# Patient Record
Sex: FEMALE | Race: WHITE | NOT HISPANIC OR LATINO | Employment: UNEMPLOYED | URBAN - METROPOLITAN AREA
[De-identification: names, ages, dates, MRNs, and addresses within clinical notes are randomized per-mention and may not be internally consistent; named-entity substitution may affect disease eponyms.]

---

## 2022-11-10 ENCOUNTER — OFFICE VISIT (OUTPATIENT)
Dept: INTERNAL MEDICINE | Facility: CLINIC | Age: 42
End: 2022-11-10

## 2022-11-10 VITALS
HEART RATE: 78 BPM | SYSTOLIC BLOOD PRESSURE: 132 MMHG | OXYGEN SATURATION: 98 % | DIASTOLIC BLOOD PRESSURE: 84 MMHG | TEMPERATURE: 97.8 F | RESPIRATION RATE: 15 BRPM | BODY MASS INDEX: 30.51 KG/M2 | HEIGHT: 69 IN | WEIGHT: 206 LBS

## 2022-11-10 DIAGNOSIS — E78.2 MIXED HYPERLIPIDEMIA: ICD-10-CM

## 2022-11-10 DIAGNOSIS — E11.8 TYPE 2 DIABETES MELLITUS WITH COMPLICATION: Primary | ICD-10-CM

## 2022-11-10 DIAGNOSIS — Z12.31 ENCOUNTER FOR SCREENING MAMMOGRAM FOR BREAST CANCER: ICD-10-CM

## 2022-11-10 DIAGNOSIS — E03.9 HYPOTHYROIDISM, UNSPECIFIED TYPE: ICD-10-CM

## 2022-11-10 DIAGNOSIS — F41.1 GENERALIZED ANXIETY DISORDER: ICD-10-CM

## 2022-11-10 DIAGNOSIS — D50.9 IRON DEFICIENCY ANEMIA, UNSPECIFIED IRON DEFICIENCY ANEMIA TYPE: ICD-10-CM

## 2022-11-10 LAB
BASOPHILS # BLD AUTO: 0.08 10*3/MM3 (ref 0–0.2)
BASOPHILS NFR BLD AUTO: 1.1 % (ref 0–1.5)
BILIRUB UR QL STRIP: NEGATIVE
CHOLEST SERPL-MCNC: 247 MG/DL (ref 0–200)
CLARITY UR: ABNORMAL
COLOR UR: YELLOW
DEPRECATED RDW RBC AUTO: 41.3 FL (ref 37–54)
EOSINOPHIL # BLD AUTO: 0.16 10*3/MM3 (ref 0–0.4)
EOSINOPHIL NFR BLD AUTO: 2.3 % (ref 0.3–6.2)
ERYTHROCYTE [DISTWIDTH] IN BLOOD BY AUTOMATED COUNT: 12.4 % (ref 12.3–15.4)
FERRITIN SERPL-MCNC: 92.5 NG/ML (ref 13–150)
GLUCOSE BLDC GLUCOMTR-MCNC: 123 MG/DL (ref 70–130)
GLUCOSE UR STRIP-MCNC: ABNORMAL MG/DL
HCT VFR BLD AUTO: 43.1 % (ref 34–46.6)
HDLC SERPL-MCNC: 57 MG/DL (ref 40–60)
HGB BLD-MCNC: 14.5 G/DL (ref 12–15.9)
HGB UR QL STRIP.AUTO: NEGATIVE
IMM GRANULOCYTES # BLD AUTO: 0.02 10*3/MM3 (ref 0–0.05)
IMM GRANULOCYTES NFR BLD AUTO: 0.3 % (ref 0–0.5)
IRON 24H UR-MRATE: 100 MCG/DL (ref 37–145)
IRON SATN MFR SERPL: 20 % (ref 20–50)
KETONES UR QL STRIP: NEGATIVE
LDLC SERPL CALC-MCNC: 147 MG/DL (ref 0–100)
LDLC/HDLC SERPL: 2.49 {RATIO}
LEUKOCYTE ESTERASE UR QL STRIP.AUTO: NEGATIVE
LYMPHOCYTES # BLD AUTO: 2.19 10*3/MM3 (ref 0.7–3.1)
LYMPHOCYTES NFR BLD AUTO: 31.2 % (ref 19.6–45.3)
MCH RBC QN AUTO: 30.5 PG (ref 26.6–33)
MCHC RBC AUTO-ENTMCNC: 33.6 G/DL (ref 31.5–35.7)
MCV RBC AUTO: 90.5 FL (ref 79–97)
MONOCYTES # BLD AUTO: 0.48 10*3/MM3 (ref 0.1–0.9)
MONOCYTES NFR BLD AUTO: 6.8 % (ref 5–12)
NEUTROPHILS NFR BLD AUTO: 4.09 10*3/MM3 (ref 1.7–7)
NEUTROPHILS NFR BLD AUTO: 58.3 % (ref 42.7–76)
NITRITE UR QL STRIP: NEGATIVE
NRBC BLD AUTO-RTO: 0 /100 WBC (ref 0–0.2)
PH UR STRIP.AUTO: 5.5 [PH] (ref 5–8)
PLATELET # BLD AUTO: 277 10*3/MM3 (ref 140–450)
PMV BLD AUTO: 11.4 FL (ref 6–12)
PROT UR QL STRIP: NEGATIVE
RBC # BLD AUTO: 4.76 10*6/MM3 (ref 3.77–5.28)
SP GR UR STRIP: >=1.03 (ref 1–1.03)
T3FREE SERPL-MCNC: 3.32 PG/ML (ref 2–4.4)
T4 FREE SERPL-MCNC: 1.12 NG/DL (ref 0.93–1.7)
TIBC SERPL-MCNC: 508 MCG/DL (ref 298–536)
TRANSFERRIN SERPL-MCNC: 341 MG/DL (ref 200–360)
TRIGL SERPL-MCNC: 239 MG/DL (ref 0–150)
TSH SERPL DL<=0.05 MIU/L-ACNC: 2.53 UIU/ML (ref 0.27–4.2)
UROBILINOGEN UR QL STRIP: ABNORMAL
VLDLC SERPL-MCNC: 43 MG/DL (ref 5–40)
WBC NRBC COR # BLD: 7.02 10*3/MM3 (ref 3.4–10.8)

## 2022-11-10 PROCEDURE — 99204 OFFICE O/P NEW MOD 45 MIN: CPT | Performed by: PHYSICIAN ASSISTANT

## 2022-11-10 PROCEDURE — 80061 LIPID PANEL: CPT | Performed by: PHYSICIAN ASSISTANT

## 2022-11-10 PROCEDURE — 84481 FREE ASSAY (FT-3): CPT | Performed by: PHYSICIAN ASSISTANT

## 2022-11-10 PROCEDURE — 82962 GLUCOSE BLOOD TEST: CPT | Performed by: PHYSICIAN ASSISTANT

## 2022-11-10 PROCEDURE — 81003 URINALYSIS AUTO W/O SCOPE: CPT | Performed by: PHYSICIAN ASSISTANT

## 2022-11-10 PROCEDURE — 83036 HEMOGLOBIN GLYCOSYLATED A1C: CPT | Performed by: PHYSICIAN ASSISTANT

## 2022-11-10 PROCEDURE — 83540 ASSAY OF IRON: CPT | Performed by: PHYSICIAN ASSISTANT

## 2022-11-10 PROCEDURE — 84439 ASSAY OF FREE THYROXINE: CPT | Performed by: PHYSICIAN ASSISTANT

## 2022-11-10 PROCEDURE — 82728 ASSAY OF FERRITIN: CPT | Performed by: PHYSICIAN ASSISTANT

## 2022-11-10 PROCEDURE — 80050 GENERAL HEALTH PANEL: CPT | Performed by: PHYSICIAN ASSISTANT

## 2022-11-10 PROCEDURE — 84466 ASSAY OF TRANSFERRIN: CPT | Performed by: PHYSICIAN ASSISTANT

## 2022-11-10 RX ORDER — ATORVASTATIN CALCIUM 10 MG/1
10 TABLET, FILM COATED ORAL DAILY
Qty: 30 TABLET | Refills: 1 | Status: SHIPPED | OUTPATIENT
Start: 2022-11-10 | End: 2023-03-14 | Stop reason: SDUPTHER

## 2022-11-10 RX ORDER — LISINOPRIL 2.5 MG/1
2.5 TABLET ORAL DAILY
Qty: 30 TABLET | Refills: 1 | Status: SHIPPED | OUTPATIENT
Start: 2022-11-10 | End: 2023-03-14 | Stop reason: SDUPTHER

## 2022-11-10 RX ORDER — FENOFIBRATE 120 MG/1
TABLET ORAL
COMMUNITY
End: 2022-11-10

## 2022-11-10 RX ORDER — BUSPIRONE HYDROCHLORIDE 10 MG/1
TABLET ORAL
COMMUNITY
Start: 2022-08-26 | End: 2022-11-21

## 2022-11-10 RX ORDER — LANCETS 28 GAUGE
EACH MISCELLANEOUS
Qty: 100 EACH | Refills: 12 | Status: SHIPPED | OUTPATIENT
Start: 2022-11-10

## 2022-11-10 RX ORDER — GEMFIBROZIL 600 MG/1
600 TABLET, FILM COATED ORAL DAILY
COMMUNITY
Start: 2022-10-20 | End: 2022-11-10

## 2022-11-10 RX ORDER — BLOOD-GLUCOSE METER
1 KIT MISCELLANEOUS 4 TIMES DAILY
COMMUNITY
Start: 2022-09-15 | End: 2022-11-10

## 2022-11-10 RX ORDER — METFORMIN HYDROCHLORIDE EXTENDED-RELEASE TABLETS 1000 MG/1
2000 TABLET, FILM COATED, EXTENDED RELEASE ORAL
Qty: 60 TABLET | Refills: 1 | Status: SHIPPED | OUTPATIENT
Start: 2022-11-10 | End: 2023-03-14 | Stop reason: SDUPTHER

## 2022-11-10 NOTE — PROGRESS NOTES
"Chief Complaint  Diabetes,  EST CARE    Subjective          Sharmaine Torres presents to [facility]  History of Present Illness  Last PCP: sri Farley  Previous Specialists: GYN at [facility]  Last labs: 3 months ago  Last mammogram: never, family history of breast cancer in paternal aunt  Last pap: 2021  Last colonoscopy: ,  no family history of colon cancer     DM: pt has been out of medicine x 10 days  Previously on metformin  Was rx ozempic but never picked it up due to PA  Last a1c 11.5  She has never been on insulin     HTN: denies ever taking anything for bp  Denies chest pain, palpitations, ha, dizziness    Hypothyroid: pt told her thyroid was abnormal previously and she was taking synthroid  States her last doctor took her off medicine.      Past Medical History:   Diagnosis Date   • Anxiety    • Depression    • Diabetes mellitus (HCC)    • Endometriosis    • Hyperlipidemia    • Hypothyroidism    • Obesity         Past Surgical History:   Procedure Laterality Date   • APPENDECTOMY     •  SECTION     • CHOLECYSTECTOMY     • HAND SURGERY     • HYSTERECTOMY     • TUBAL ABDOMINAL LIGATION          Current Outpatient Medications on File Prior to Visit   Medication Sig Dispense Refill   • busPIRone (BUSPAR) 10 MG tablet        No current facility-administered medications on file prior to visit.        Allergies   Allergen Reactions   • Codeine Nausea And Vomiting     Pt stated tylenol codeine       Social History     Tobacco Use   Smoking Status Never   Smokeless Tobacco Never          Objective   Vital Signs:   /84   Pulse 78   Temp 97.8 °F (36.6 °C)   Resp 15   Ht 175.3 cm (69\")   Wt 93.4 kg (206 lb)   SpO2 98%   BMI 30.42 kg/m²     Physical Exam  Vitals reviewed.   Constitutional:       Appearance: Normal appearance.   HENT:      Head: Normocephalic and atraumatic.      Nose: Nose normal.      Mouth/Throat:    "   Mouth: Mucous membranes are moist.   Eyes:      Extraocular Movements: Extraocular movements intact.      Conjunctiva/sclera: Conjunctivae normal.      Pupils: Pupils are equal, round, and reactive to light.   Cardiovascular:      Rate and Rhythm: Normal rate and regular rhythm.   Pulmonary:      Effort: Pulmonary effort is normal.      Breath sounds: Normal breath sounds.   Abdominal:      General: Abdomen is flat. Bowel sounds are normal.      Palpations: Abdomen is soft.   Musculoskeletal:         General: Normal range of motion.   Neurological:      General: No focal deficit present.      Mental Status: She is alert and oriented to person, place, and time.   Psychiatric:         Mood and Affect: Mood normal.        Result Review :                 Assessment and Plan    Diagnoses and all orders for this visit:    1. Type 2 diabetes mellitus with complication (HCC) (Primary)  Assessment & Plan:  Risks of blood sugar elevation include death, heart attack, stroke, kidney disease, blindness. Discussed importance of medication compliance and not missing doses. Will restart metformin. We will monitor at follow up after labs drawn and adjust medications if still elevated. To er if chest pain, palpitations, vision loss, unilateral weakness, altered mental status.Discussed glucometer sent to pharmacy, check blood sugar first thing in the morning to get fasting sugar reading. Bring blood glucose log to follow up. Will start ACEI for kidney protection and bp. Will start statin for cardiac protection and cholesterol.   Pt understands and agrees with plan.    Orders:  -     POCT Glucose  -     Urinalysis With Culture If Indicated -; Future  -     Comprehensive Metabolic Panel  -     CBC & Differential  -     Hemoglobin A1c  -     Ambulatory Referral to Chronic Care Management  -     Urinalysis With Culture If Indicated - Urine, Clean Catch    2. Mixed hyperlipidemia  -     Lipid Panel  -     Ambulatory Referral to Chronic  Care Management    3. Hypothyroidism, unspecified type  Assessment & Plan:  Labs today, will restart medicine if indicated based on results.    Orders:  -     TSH  -     T3, Free  -     T4, Free  -     Ambulatory Referral to Chronic Care Management    4. Iron deficiency anemia, unspecified iron deficiency anemia type  -     Iron and TIBC  -     Ferritin    5. Encounter for screening mammogram for breast cancer  -     Mammo Screening Bilateral With CAD; Future    6. Generalized anxiety disorder  Comments:  controlled on buspar    Other orders  -     glucose blood test strip; Check blood sugar once daily in the morning  Dispense: 100 each; Refill: 12  -     Lancets (freestyle) lancets; Check blood sugar once daily in the morning  Dispense: 100 each; Refill: 12  -     Blood Glucose Monitoring Suppl w/Device kit; Dispense 1 kit with monitor to check blood sugar once daily. Diagnosis: DM E11.65  Dispense: 1 each; Refill: 0  -     metFORMIN (FORTAMET) 1000 MG (OSM) 24 hr tablet; Take 2 tablets by mouth Daily With Breakfast.  Dispense: 60 tablet; Refill: 1  -     lisinopril (Zestril) 2.5 MG tablet; Take 1 tablet by mouth Daily.  Dispense: 30 tablet; Refill: 1  -     atorvastatin (LIPITOR) 10 MG tablet; Take 1 tablet by mouth Daily.  Dispense: 30 tablet; Refill: 1      Follow Up   Return in about 2 weeks (around 11/24/2022).  Patient was given instructions and counseling regarding her condition or for health maintenance advice. Please see specific information pulled into the AVS if appropriate.

## 2022-11-11 ENCOUNTER — REFERRAL TRIAGE (OUTPATIENT)
Dept: CASE MANAGEMENT | Facility: OTHER | Age: 42
End: 2022-11-11

## 2022-11-11 ENCOUNTER — TRANSCRIBE ORDERS (OUTPATIENT)
Dept: ADMINISTRATIVE | Facility: HOSPITAL | Age: 42
End: 2022-11-11

## 2022-11-11 DIAGNOSIS — R03.0 ELEVATED BLOOD PRESSURE READING: ICD-10-CM

## 2022-11-11 DIAGNOSIS — E03.9 HYPOTHYROIDISM, UNSPECIFIED TYPE: Primary | ICD-10-CM

## 2022-11-11 DIAGNOSIS — Z12.31 VISIT FOR SCREENING MAMMOGRAM: Primary | ICD-10-CM

## 2022-11-11 DIAGNOSIS — E78.2 MIXED HYPERLIPIDEMIA: ICD-10-CM

## 2022-11-11 DIAGNOSIS — E11.8 TYPE 2 DIABETES MELLITUS WITH COMPLICATION: ICD-10-CM

## 2022-11-11 LAB
ALBUMIN SERPL-MCNC: 4.7 G/DL (ref 3.5–5.2)
ALBUMIN/GLOB SERPL: 1.6 G/DL
ALP SERPL-CCNC: 83 U/L (ref 39–117)
ALT SERPL W P-5'-P-CCNC: 16 U/L (ref 1–33)
ANION GAP SERPL CALCULATED.3IONS-SCNC: 9.4 MMOL/L (ref 5–15)
AST SERPL-CCNC: 24 U/L (ref 1–32)
BILIRUB SERPL-MCNC: 0.2 MG/DL (ref 0–1.2)
BUN SERPL-MCNC: 9 MG/DL (ref 6–20)
BUN/CREAT SERPL: 20.5 (ref 7–25)
CALCIUM SPEC-SCNC: 10.3 MG/DL (ref 8.6–10.5)
CHLORIDE SERPL-SCNC: 97 MMOL/L (ref 98–107)
CO2 SERPL-SCNC: 29.6 MMOL/L (ref 22–29)
CREAT SERPL-MCNC: 0.44 MG/DL (ref 0.57–1)
EGFRCR SERPLBLD CKD-EPI 2021: 125.6 ML/MIN/1.73
GLOBULIN UR ELPH-MCNC: 2.9 GM/DL
GLUCOSE SERPL-MCNC: 117 MG/DL (ref 65–99)
HBA1C MFR BLD: 8.7 % (ref 4.8–5.6)
POTASSIUM SERPL-SCNC: 4.5 MMOL/L (ref 3.5–5.2)
PROT SERPL-MCNC: 7.6 G/DL (ref 6–8.5)
SODIUM SERPL-SCNC: 136 MMOL/L (ref 136–145)

## 2022-11-14 PROBLEM — F41.1 GENERALIZED ANXIETY DISORDER: Status: ACTIVE | Noted: 2022-11-14

## 2022-11-14 NOTE — PATIENT INSTRUCTIONS
Preventing Diabetes Mellitus Complications  You can help to prevent or slow down problems that are caused by diabetes (diabetes mellitus). Following your diabetes plan and taking care of yourself can reduce your risk of serious or life-threatening complications.  What actions can I take to prevent diabetes complications?  Diabetes management    Follow instructions from your health care providers about managing your diabetes. Your diabetes may be managed by a team of health care providers who can teach you how to care for yourself and can answer questions that you have.  Educate yourself about your condition so you can make healthy choices about eating and physical activity.  Know your target range for your blood sugar (glucose), and check your blood glucose level as often as told. Your health care provider will help you decide how often to check your blood glucose level depending on your treatment goals and how well you are meeting them.  Ask your health care provider if you should take low-dose aspirin daily and what dose is recommended for you. Taking low-dose aspirin daily is recommended to help prevent cardiovascular disease.  Controlling your blood pressure and cholesterol  Your personal target blood pressure is determined based on:  Your age.  Your medicines.  How long you have had diabetes.  Any other medical conditions you have.  To control your blood pressure:  Follow instructions from your health care provider about meal planning, exercise, and medicines.  Make sure your health care provider checks your blood pressure at every medical visit.  Monitor your blood pressure at home as told by your health care provider.  To control your cholesterol:  Follow instructions from your health care provider about meal planning, exercise, and medicines.  Have your cholesterol checked at least once a year.  You may be prescribed medicine to lower cholesterol (statin). If you are not taking a statin, ask your health care  provider if you should be.  Controlling your cholesterol may:  Help prevent heart disease and stroke. These are the most common health problems for people with diabetes.  Improve your blood flow.    Medical appointments and vaccines  Schedule and keep yearly physical exams and eye exams. Your health care provider will tell you how often you need medical visits depending on your diabetes management plan. Keep all follow-up visits as told. This is important so possible problems can be identified early and complications can be avoided or treated.  Every visit with your health care provider should include measuring your:  Weight.  Blood pressure.  Blood glucose control.  Your A1C (hemoglobin A1C) level should be checked:  At least 2 times a year, if you are meeting your treatment goals.  4 times a year, if you are not meeting treatment goals or if your treatment goals have changed.  Your blood lipids (lipid profile) should be checked yearly. You should also be checked yearly for protein in your urine (urine microalbumin).  If you have type 1 diabetes, get an eye exam 3-5 years after you are diagnosed, and then once a year after your first exam.  If you have type 2 diabetes, get an eye exam as soon as you are diagnosed, and then once a year after your first exam.  It is also important to keep your vaccines current. It is recommended that you receive:  A flu (influenza) vaccine every year.  A pneumonia (pneumococcal) vaccine and a hepatitis B vaccine. If you are age 65 or older, you may get the pneumonia vaccine as a series of two separate shots.  Ask your health care provider which other vaccines may be recommended.  Lifestyle  Do not use any products that contain nicotine or tobacco, such as cigarettes, e-cigarettes, and chewing tobacco. If you need help quitting, ask your health care provider. By avoiding nicotine and tobacco:  You will lower your risk for heart attack, stroke, nerve disease, and kidney disease.  Your  cholesterol and blood pressure may improve.  Your blood circulation will improve.  If you drink alcohol:  Limit how much you use to:  0-1 drink a day for women who are not pregnant.  0-2 drinks a day for men.  Be aware of how much alcohol is in your drink. In the [place] one drink equals one 12 oz bottle of beer (355 mL), one 5 oz glass of wine (148 mL), or one 11?2 oz glass of hard liquor (44 mL).  Taking care of your feet  Diabetes may cause you to have poor blood circulation to your legs and feet. Because of this, taking care of your feet is very important. Diabetes can cause:  The skin on the feet to get thinner, break more easily, and heal more slowly.  Nerve damage in your legs and feet, which results in decreased feeling. You may not notice minor injuries that could lead to serious problems.  To avoid foot problems:  Check your skin and feet every day for cuts, bruises, redness, blisters, or sores.  Schedule a foot exam with your health care provider once every year. This exam includes:  Inspecting the structure and skin of your feet.  Checking the pulses and sensation in your feet.  Make sure that your health care provider performs a visual foot exam at every medical visit.    Taking care of your teeth  People with poorly controlled diabetes are more likely to have gum (periodontal) disease. Diabetes can make periodontal diseases harder to control. If not treated, periodontal diseases can lead to tooth loss. To prevent this:  Brush your teeth twice a day.  Floss at least once a day.  Visit your dentist 2 times a year.  Managing stress  Living with diabetes can be stressful. When you are experiencing stress, your blood glucose may be affected in two ways:  Stress hormones may cause your blood glucose to rise.  You may be distracted from taking good care of yourself.  Be aware of your stress level and make changes to help you manage challenging situations. To lower your stress levels:  Consider joining a support  group.  Do planned relaxation or meditation.  Do a hobby that you enjoy.  Maintain healthy relationships.  Exercise regularly.  Work with your health care provider or a mental health professional.  Where to find more information  American Diabetes Association: www.diabetes.org  Association of Diabetes Care and Education Specialists: www.diabeteseducator.org  Summary  You can take action to prevent or slow down problems that are caused by diabetes (diabetes mellitus). Following your diabetes plan and taking care of yourself can reduce your risk of serious or life-threatening complications.  Follow instructions from your health care providers about managing your diabetes. Your diabetes may be managed by a team of health care providers who can teach you how to care for yourself and can answer questions that you have.  Know your target range for your blood sugar (glucose), and check your blood glucose levels as often as told. Your health care provider will help you decide how often you should check your blood glucose level depending on your treatment goals and how well you are meeting them.  Your health care provider will tell you how often you need medical visits depending on your diabetes management plan. Keep all follow-up visits as directed. This is important so possible problems can be identified early and complications can be avoided or treated.  This information is not intended to replace advice given to you by your health care provider. Make sure you discuss any questions you have with your health care provider.  Document Revised: 02/05/2021 Document Reviewed: 02/05/2021  Elsevier Patient Education © 2022 [employer]

## 2022-11-14 NOTE — PROGRESS NOTES
I have reviewed the notes, assessments, and/or procedures performed by Adri Reeder PA-C, I concur with her documentation of Sharmaine Torres.

## 2022-11-14 NOTE — ASSESSMENT & PLAN NOTE
Risks of blood sugar elevation include death, heart attack, stroke, kidney disease, blindness. Discussed importance of medication compliance and not missing doses. Will restart metformin. We will monitor at follow up after labs drawn and adjust medications if still elevated. To er if chest pain, palpitations, vision loss, unilateral weakness, altered mental status.Discussed glucometer sent to pharmacy, check blood sugar first thing in the morning to get fasting sugar reading. Bring blood glucose log to follow up. Will start ACEI for kidney protection and bp. Will start statin for cardiac protection and cholesterol.   Pt understands and agrees with plan.

## 2022-11-17 ENCOUNTER — TELEPHONE (OUTPATIENT)
Dept: CASE MANAGEMENT | Facility: OTHER | Age: 42
End: 2022-11-17

## 2022-11-17 NOTE — TELEPHONE ENCOUNTER
Called patient for chronic care management - regarding DM, HTN and new medications. Need to follow up on sugar log and bp.  Will attempt again next week.

## 2022-11-21 ENCOUNTER — TELEPHONE (OUTPATIENT)
Dept: CASE MANAGEMENT | Facility: OTHER | Age: 42
End: 2022-11-21

## 2022-11-21 RX ORDER — BUSPIRONE HYDROCHLORIDE 10 MG/1
10 TABLET ORAL 2 TIMES DAILY PRN
Qty: 60 TABLET | Refills: 1 | Status: SHIPPED | OUTPATIENT
Start: 2022-11-21 | End: 2023-02-15

## 2022-12-01 ENCOUNTER — TELEPHONE (OUTPATIENT)
Dept: CASE MANAGEMENT | Facility: OTHER | Age: 42
End: 2022-12-01

## 2022-12-01 NOTE — TELEPHONE ENCOUNTER
Left detailed message for patient to return my call. There has been several attempts to contact for CCM enrollment,b/p and glucose logs. This referral will be closed for unable to reach.

## 2023-02-13 ENCOUNTER — HOSPITAL ENCOUNTER (OUTPATIENT)
Dept: MAMMOGRAPHY | Facility: HOSPITAL | Age: 43
Discharge: HOME | End: 2023-02-13
Admitting: PHYSICIAN ASSISTANT
Payer: MEDICAID

## 2023-02-13 DIAGNOSIS — Z12.31 VISIT FOR SCREENING MAMMOGRAM: ICD-10-CM

## 2023-02-13 PROCEDURE — 77067 SCR MAMMO BI INCL CAD: CPT

## 2023-02-13 PROCEDURE — 77063 BREAST TOMOSYNTHESIS BI: CPT

## 2023-02-15 RX ORDER — BUSPIRONE HYDROCHLORIDE 10 MG/1
TABLET ORAL
Qty: 180 TABLET | Refills: 1 | Status: SHIPPED | OUTPATIENT
Start: 2023-02-15 | End: 2023-03-14 | Stop reason: SDUPTHER

## 2023-03-02 ENCOUNTER — TELEPHONE (OUTPATIENT)
Dept: INTERNAL MEDICINE | Facility: CLINIC | Age: 43
End: 2023-03-02
Payer: MEDICAID

## 2023-03-02 NOTE — TELEPHONE ENCOUNTER
"Patient states she is having chest pain blood sugar staying in 300\"to 384. She states she is taking metformin. She states that she feels weird. I advised her to go to the Emergency Room . Patient states she will go.   "

## 2023-03-14 ENCOUNTER — OFFICE VISIT (OUTPATIENT)
Dept: INTERNAL MEDICINE | Facility: CLINIC | Age: 43
End: 2023-03-14
Payer: MEDICAID

## 2023-03-14 ENCOUNTER — TELEPHONE (OUTPATIENT)
Dept: INTERNAL MEDICINE | Facility: CLINIC | Age: 43
End: 2023-03-14
Payer: MEDICAID

## 2023-03-14 VITALS
BODY MASS INDEX: 31.84 KG/M2 | HEART RATE: 78 BPM | HEIGHT: 69 IN | TEMPERATURE: 97.9 F | DIASTOLIC BLOOD PRESSURE: 82 MMHG | SYSTOLIC BLOOD PRESSURE: 134 MMHG | OXYGEN SATURATION: 97 % | WEIGHT: 214.95 LBS

## 2023-03-14 DIAGNOSIS — F41.1 GENERALIZED ANXIETY DISORDER: ICD-10-CM

## 2023-03-14 DIAGNOSIS — Z11.59 NEED FOR HEPATITIS C SCREENING TEST: ICD-10-CM

## 2023-03-14 DIAGNOSIS — F33.1 MODERATE EPISODE OF RECURRENT MAJOR DEPRESSIVE DISORDER: ICD-10-CM

## 2023-03-14 DIAGNOSIS — E78.2 MIXED HYPERLIPIDEMIA: ICD-10-CM

## 2023-03-14 DIAGNOSIS — E11.9 ENCOUNTER FOR DIABETIC FOOT EXAM: ICD-10-CM

## 2023-03-14 DIAGNOSIS — Z00.00 ANNUAL PHYSICAL EXAM: Primary | ICD-10-CM

## 2023-03-14 DIAGNOSIS — F90.2 ATTENTION DEFICIT HYPERACTIVITY DISORDER (ADHD), COMBINED TYPE: ICD-10-CM

## 2023-03-14 DIAGNOSIS — E11.65 TYPE 2 DIABETES MELLITUS WITH HYPERGLYCEMIA, WITHOUT LONG-TERM CURRENT USE OF INSULIN: ICD-10-CM

## 2023-03-14 LAB
ALBUMIN SERPL-MCNC: 4.7 G/DL (ref 3.5–5.2)
ALBUMIN UR-MCNC: 43.7 MG/DL
ALBUMIN/GLOB SERPL: 1.5 G/DL
ALP SERPL-CCNC: 115 U/L (ref 39–117)
ALT SERPL W P-5'-P-CCNC: 30 U/L (ref 1–33)
ANION GAP SERPL CALCULATED.3IONS-SCNC: 13.2 MMOL/L (ref 5–15)
AST SERPL-CCNC: 29 U/L (ref 1–32)
BASOPHILS # BLD AUTO: 0.07 10*3/MM3 (ref 0–0.2)
BASOPHILS NFR BLD AUTO: 1.2 % (ref 0–1.5)
BILIRUB SERPL-MCNC: 0.3 MG/DL (ref 0–1.2)
BUN SERPL-MCNC: 12 MG/DL (ref 6–20)
BUN/CREAT SERPL: 18.2 (ref 7–25)
CALCIUM SPEC-SCNC: 10 MG/DL (ref 8.6–10.5)
CHLORIDE SERPL-SCNC: 98 MMOL/L (ref 98–107)
CHOLEST SERPL-MCNC: 257 MG/DL (ref 0–200)
CO2 SERPL-SCNC: 22.8 MMOL/L (ref 22–29)
CREAT SERPL-MCNC: 0.66 MG/DL (ref 0.57–1)
DEPRECATED RDW RBC AUTO: 39.9 FL (ref 37–54)
EGFRCR SERPLBLD CKD-EPI 2021: 113.9 ML/MIN/1.73
EOSINOPHIL # BLD AUTO: 0.13 10*3/MM3 (ref 0–0.4)
EOSINOPHIL NFR BLD AUTO: 2.3 % (ref 0.3–6.2)
ERYTHROCYTE [DISTWIDTH] IN BLOOD BY AUTOMATED COUNT: 12.3 % (ref 12.3–15.4)
GLOBULIN UR ELPH-MCNC: 3.1 GM/DL
GLUCOSE SERPL-MCNC: 367 MG/DL (ref 65–99)
HBA1C MFR BLD: 9.4 % (ref 4.8–5.6)
HCT VFR BLD AUTO: 43.5 % (ref 34–46.6)
HCV AB SER DONR QL: NORMAL
HDLC SERPL-MCNC: 45 MG/DL (ref 40–60)
HGB BLD-MCNC: 14.8 G/DL (ref 12–15.9)
IMM GRANULOCYTES # BLD AUTO: 0.01 10*3/MM3 (ref 0–0.05)
IMM GRANULOCYTES NFR BLD AUTO: 0.2 % (ref 0–0.5)
LDLC SERPL CALC-MCNC: 148 MG/DL (ref 0–100)
LDLC/HDLC SERPL: 3.17 {RATIO}
LYMPHOCYTES # BLD AUTO: 2.66 10*3/MM3 (ref 0.7–3.1)
LYMPHOCYTES NFR BLD AUTO: 46.1 % (ref 19.6–45.3)
MCH RBC QN AUTO: 31 PG (ref 26.6–33)
MCHC RBC AUTO-ENTMCNC: 34 G/DL (ref 31.5–35.7)
MCV RBC AUTO: 91 FL (ref 79–97)
MONOCYTES # BLD AUTO: 0.43 10*3/MM3 (ref 0.1–0.9)
MONOCYTES NFR BLD AUTO: 7.5 % (ref 5–12)
NEUTROPHILS NFR BLD AUTO: 2.47 10*3/MM3 (ref 1.7–7)
NEUTROPHILS NFR BLD AUTO: 42.7 % (ref 42.7–76)
NRBC BLD AUTO-RTO: 0 /100 WBC (ref 0–0.2)
PLATELET # BLD AUTO: 256 10*3/MM3 (ref 140–450)
PMV BLD AUTO: 11.3 FL (ref 6–12)
POTASSIUM SERPL-SCNC: 4.8 MMOL/L (ref 3.5–5.2)
PROT SERPL-MCNC: 7.8 G/DL (ref 6–8.5)
RBC # BLD AUTO: 4.78 10*6/MM3 (ref 3.77–5.28)
SODIUM SERPL-SCNC: 134 MMOL/L (ref 136–145)
TRIGL SERPL-MCNC: 347 MG/DL (ref 0–150)
VLDLC SERPL-MCNC: 64 MG/DL (ref 5–40)
WBC NRBC COR # BLD: 5.77 10*3/MM3 (ref 3.4–10.8)

## 2023-03-14 PROCEDURE — 83036 HEMOGLOBIN GLYCOSYLATED A1C: CPT

## 2023-03-14 PROCEDURE — 80061 LIPID PANEL: CPT

## 2023-03-14 PROCEDURE — 99396 PREV VISIT EST AGE 40-64: CPT

## 2023-03-14 PROCEDURE — 80053 COMPREHEN METABOLIC PANEL: CPT

## 2023-03-14 PROCEDURE — 86803 HEPATITIS C AB TEST: CPT

## 2023-03-14 PROCEDURE — 82043 UR ALBUMIN QUANTITATIVE: CPT

## 2023-03-14 PROCEDURE — 85025 COMPLETE CBC W/AUTO DIFF WBC: CPT

## 2023-03-14 PROCEDURE — 1160F RVW MEDS BY RX/DR IN RCRD: CPT

## 2023-03-14 RX ORDER — METFORMIN HYDROCHLORIDE EXTENDED-RELEASE TABLETS 1000 MG/1
2000 TABLET, FILM COATED, EXTENDED RELEASE ORAL
Qty: 60 TABLET | Refills: 1 | Status: SHIPPED | OUTPATIENT
Start: 2023-03-14

## 2023-03-14 RX ORDER — CHLORHEXIDINE GLUCONATE 0.12 MG/ML
RINSE ORAL
COMMUNITY
Start: 2023-03-07

## 2023-03-14 RX ORDER — IBUPROFEN 800 MG/1
800 TABLET ORAL EVERY 6 HOURS PRN
COMMUNITY
Start: 2023-03-07

## 2023-03-14 RX ORDER — ACETAMINOPHEN AND CODEINE PHOSPHATE 300; 30 MG/1; MG/1
TABLET ORAL SEE ADMIN INSTRUCTIONS
COMMUNITY
Start: 2023-03-07

## 2023-03-14 RX ORDER — LISINOPRIL 2.5 MG/1
2.5 TABLET ORAL DAILY
Qty: 90 TABLET | Refills: 0 | Status: SHIPPED | OUTPATIENT
Start: 2023-03-14

## 2023-03-14 RX ORDER — ATORVASTATIN CALCIUM 10 MG/1
10 TABLET, FILM COATED ORAL DAILY
Qty: 90 TABLET | Refills: 0 | Status: SHIPPED | OUTPATIENT
Start: 2023-03-14

## 2023-03-14 RX ORDER — AMOXICILLIN 500 MG/1
1 CAPSULE ORAL 3 TIMES DAILY
COMMUNITY
Start: 2023-03-07

## 2023-03-14 RX ORDER — BUSPIRONE HYDROCHLORIDE 10 MG/1
10 TABLET ORAL NIGHTLY
Qty: 180 TABLET | Refills: 1 | Status: SHIPPED | OUTPATIENT
Start: 2023-03-14

## 2023-03-14 NOTE — PROGRESS NOTES
Chief Complaint  Diabetes (High blood sugar )    Subjective      Sharmaine Torres presents to [facility]  History of Present Illness    Sharmaine is here for follow-up on diabetes and annual exam.    Previous Specialists: GYN at [facility]  Last labs: 4 months ago  Last mammogram: 2/13/2023  Last pap: June 2021  Last colonoscopy: 2021,  no family history of colon cancer      DM:   During her last office visit she was restarted on metformin, lisinopril for kidney protection, and a statin for cardiovascular health.  Patient reports she is not taking lisinopril or atorvastatin.  Last A1c, November 2022, was 8.7.  She reports being on Ozempic, Trulicity, insulin in the past.  Reviewed telephone encounter from 3/2/2023 that mention the patient called into the office with chest pain and high blood sugars.  She reported at that time her blood sugar was staying in the 300s.  She says that her sugars have been staying consistently in the 300s since then.  She stated she felt very off during that episode as well.  The phone nurse advised her to go to the emergency department that day and patient verbalized that she would go.  No further documentation of ER visit after that day. She felt better shortly after that and did not go to the ER for evaluation. Blood sugars have been running in the 300s     Tooth -   She reports recent multiple teeth extraction (5) and having multiple rounds of antibiotics.  She says she still having some swelling in her face and significant discomfort.     She denies any previous medical history of hypertension.  She reports being prescribed lisinopril 2.5 mg for kidney protection but did not take the medication yet.  She denies chest pain, shortness of breath, palpitations, headache, dizziness.    Hypothyroid: She reports that once her thyroid was abnormal and she was on thyroid medication at that time.  Her last doctor took her off the medication and  has not had to have replacement medicine in a while.  She denies any further concerns regarding her thyroid.      Anxiety -   Buspar takes once a night.  She reports an increase in anxiety and panic attacks and possibly some depression.  She reports her mother-in-law has moved in with her and that has caused some significant issues with her anxiety/stress.  She also reports that her children have moved out all but 1 and that has been causing some emotional distress.  She reports having a positive medical history of ADHD and is wondering about evaluation for adult ADHD and medication.  She also wonders if she would do better with a different medication other than the BuSpar.  She says she been taking one 5 mg pill of BuSpar nightly at bedtime.  She says it does help her sleep and helps with her anxiety some.  She denies SI/HI but she does have some increasing depressive thoughts.  She thinks counseling would be beneficial for her.         Current Outpatient Medications   Medication Instructions   • acetaminophen-codeine (TYLENOL #3) 300-30 MG per tablet Oral, See Admin Instructions, Take 1 tablet by mouth every 4-6 hours as needed for pain   • amoxicillin (AMOXIL) 500 MG capsule 1 capsule, Oral, 3 Times Daily   • atorvastatin (LIPITOR) 10 mg, Oral, Daily   • Blood Glucose Monitoring Suppl w/Device kit Dispense 1 kit with monitor to check blood sugar once daily. Diagnosis: DM E11.65   • busPIRone (BUSPAR) 10 mg, Oral, Nightly   • chlorhexidine (PERIDEX) 0.12 % solution RINSE WITH 15ML FOR 30 SECONDS AND SPIT, USE TWICE DAILY AFTER BRUSHING AND FLOSSING . STARTING ON DAY 1, USE FOR 14 DAYS   • glucose blood test strip Check blood sugar once daily in the morning   • ibuprofen (ADVIL,MOTRIN) 800 mg, Oral, Every 6 Hours PRN, for pain   • Lancets (freestyle) lancets Check blood sugar once daily in the morning   • lisinopril (ZESTRIL) 2.5 mg, Oral, Daily   • metFORMIN (FORTAMET) 2,000 mg, Oral, Daily With Breakfast  "      The following portions of the patient's history were reviewed and updated as appropriate: allergies, current medications, past family history, past medical history, past social history, past surgical history, and problem list.    Objective   Vital Signs:   /82   Pulse 78   Temp 97.9 °F (36.6 °C)   Ht 175.3 cm (69\")   Wt 97.5 kg (214 lb 15.2 oz)   SpO2 97%   BMI 31.74 kg/m²     Wt Readings from Last 3 Encounters:   03/14/23 97.5 kg (214 lb 15.2 oz)   11/10/22 93.4 kg (206 lb)     BP Readings from Last 3 Encounters:   03/14/23 134/82   11/10/22 132/84     Physical Exam  Vitals reviewed.   Constitutional:       Appearance: Normal appearance. She is well-developed.   HENT:      Head: Normocephalic and atraumatic.      Mouth/Throat:      Pharynx: No oropharyngeal exudate.   Eyes:      Conjunctiva/sclera: Conjunctivae normal.      Pupils: Pupils are equal, round, and reactive to light.   Cardiovascular:      Rate and Rhythm: Normal rate and regular rhythm.      Heart sounds: No murmur heard.    No friction rub. No gallop.   Pulmonary:      Effort: Pulmonary effort is normal.      Breath sounds: Normal breath sounds. No wheezing or rhonchi.   Skin:     General: Skin is warm and dry.   Neurological:      Mental Status: She is alert and oriented to person, place, and time.   Psychiatric:         Mood and Affect: Affect normal.          Result Review :  The following data was reviewed by: JANE Euceda on 03/14/2023:  Lipid Panel    Lipid Panel 11/10/22   Total Cholesterol 247 (A)   Triglycerides 239 (A)   HDL Cholesterol 57   VLDL Cholesterol 43 (A)   LDL Cholesterol  147 (A)   LDL/HDL Ratio 2.49   (A) Abnormal value            TSH    TSH 11/10/22   TSH 2.530           A1C Last 3 Results    HGBA1C Last 3 Results 11/10/22   Hemoglobin A1C 8.70 (A)   (A) Abnormal value              Common labs    Common Labs 11/10/22 11/10/22 11/10/22 11/10/22    1631 1631 1631 1631   Glucose   117 (A)    BUN   9  "   Creatinine   0.44 (A)    Sodium   136    Potassium   4.5    Chloride   97 (A)    Calcium   10.3    Albumin   4.70    Total Bilirubin   0.2    Alkaline Phosphatase   83    AST (SGOT)   24    ALT (SGPT)   16    WBC 7.02      Hemoglobin 14.5      Hematocrit 43.1      Platelets 277      Total Cholesterol  247 (A)     Triglycerides  239 (A)     HDL Cholesterol  57     LDL Cholesterol   147 (A)     Hemoglobin A1C    8.70 (A)   (A) Abnormal value              Lab Results (last 72 hours)     Procedure Component Value Units Date/Time    CBC & Differential [[id number]] Collected: 03/14/23 1240    Specimen: Blood from Arm, Left Updated: 03/14/23 1241    Narrative:      The following orders were created for panel order CBC & Differential.  Procedure                               Abnormality         Status                     ---------                               -----------         ------                     CBC Auto Differential[[id number]]                            In process                   Please view results for these tests on the individual orders.    Comprehensive Metabolic Panel [[id number]] Collected: 03/14/23 1240    Specimen: Blood from Arm, Left Updated: 03/14/23 1241    Hemoglobin A1c [[id number]] Collected: 03/14/23 1240    Specimen: Blood from Arm, Left Updated: 03/14/23 1241    Lipid Panel [[id number]] Collected: 03/14/23 1240    Specimen: Blood from Arm, Left Updated: 03/14/23 1241    MicroAlbumin, Urine, Random - Urine, Clean Catch [[id number]] Collected: 03/14/23 1240    Specimen: Urine, Clean Catch Updated: 03/14/23 1241    Hepatitis C Antibody [[id number]] Collected: 03/14/23 1240    Specimen: Blood from Arm, Left Updated: 03/14/23 1241    CBC Auto Differential [[id number]] Collected: 03/14/23 1240    Specimen: Blood from Arm, Left Updated: 03/14/23 1241           Mammo Screening Digital Tomosynthesis Bilateral With CAD    Result Date: 2/13/2023   Benign mammogram. Suggest routine mammographic screening.   RECOMMENDATION(S):  ROUTINE MAMMOGRAM AND CLINICAL EVALUATION IN 12 MONTHS.   BIRADS:  DIAGNOSTIC CATEGORY 2--BENIGN FINDING   BREAST COMPOSITION: Scattered areas fibroglandular density.  PLEASE NOTE:  A NORMAL MAMMOGRAM DOES NOT EXCLUDE THE POSSIBILITY OF BREAST CANCER. ANY CLINICALLY SUSPICIOUS PALPABLE LUMP SHOULD BE BIOPSIED.      TIM CRUZ MD       Electronically Signed and Approved By: TIM CRUZ MD on 2/13/2023 at 14:02               Lab Results   Component Value Date    INR 0.87 (L) 10/08/2020    BILIRUBINUR Negative 11/10/2022    POCGLU 123 11/10/2022       Procedures        Assessment and Plan   Diagnoses and all orders for this visit:    1. Annual physical exam (Primary)  Assessment & Plan:  Screening labs reviewed/ordered  Counseling provided regarding age appropriate screenings and immunizations, healthy diet and exercise.       2. Type 2 diabetes mellitus with hyperglycemia, without long-term current use of insulin (HCC)  Comments:  Most likely expected for A1c to increase.  Patient has been taking metformin intermittently.  Discussed medication compliance.  Initially, last visit with PCP Jardiance was sent into pharmacy however patient states she never received the prescription.  We will plan to restart/initiate Jardiance for further diabetes control.  Discussed possible need of injectable medication depending on A1c.  Patient wishes to exhaust all pill forms of medication prior to initiating injections if possible.  Discussed appropriate diet changes and most likely elevated sugars related to infection/inflammation from recent teeth extractions.  Discussed worrisome symptoms and when to follow-up.  Recommended 3-month follow-up with repeat labs.  Orders:  -     CBC & Differential  -     Comprehensive Metabolic Panel  -     Hemoglobin A1c  -     Lipid Panel  -     MicroAlbumin, Urine, Random - Urine, Clean Catch  -     Ambulatory Referral for Diabetic Eye Exam-Ophthalmology  -      Ambulatory Referral to Podiatry  -     atorvastatin (LIPITOR) 10 MG tablet; Take 1 tablet by mouth Daily.  Dispense: 90 tablet; Refill: 0  -     lisinopril (Zestril) 2.5 MG tablet; Take 1 tablet by mouth Daily.  Dispense: 90 tablet; Refill: 0  -     metFORMIN (FORTAMET) 1000 MG (OSM) 24 hr tablet; Take 2 tablets by mouth Daily With Breakfast.  Dispense: 60 tablet; Refill: 1  -     busPIRone (BUSPAR) 10 MG tablet; Take 1 tablet by mouth Every Night.  Dispense: 180 tablet; Refill: 1    3. Need for hepatitis C screening test  -     Hepatitis C Antibody    4. Encounter for diabetic foot exam (HCC)  -     Ambulatory Referral to Podiatry    5. Generalized anxiety disorder  Comments:  Recommended increasing BuSpar to 10 mg nightly.  Psych referral also placed for further evaluation/treatment plan options.  Orders:  -     Ambulatory Referral to Psychiatry    6. Moderate episode of recurrent major depressive disorder (HCC)  Comments:  Patient reports increasing depression most likely triggered by anxiety/panic attacks.  Psych referral placed.  Orders:  -     Ambulatory Referral to Psychiatry    7. Attention deficit hyperactivity disorder (ADHD), combined type  Comments:  History of ADHD and patient was treated with medications in the past.  Patient requesting evaluation.  Psych referral placed.  Orders:  -     Ambulatory Referral to Psychiatry    8. Mixed hyperlipidemia  Comments:  Strongly encourage patient to start taking low-dose Lipitor.  Cholesterol panel drawn today for reevaluation.  Discussed benefits of medication        Medications Discontinued During This Encounter   Medication Reason   • metFORMIN (FORTAMET) 1000 MG (OSM) 24 hr tablet Reorder   • lisinopril (Zestril) 2.5 MG tablet Reorder   • atorvastatin (LIPITOR) 10 MG tablet Reorder   • busPIRone (BUSPAR) 10 MG tablet Reorder        I spent 37 minutes caring for Sharmaine on this date of service. This time includes time spent by me in the following  activities:preparing for the visit, reviewing tests, obtaining and/or reviewing a separately obtained history, performing a medically appropriate examination and/or evaluation , counseling and educating the patient/family/caregiver, ordering medications, tests, or procedures, referring and communicating with other health care professionals , documenting information in the medical record, independently interpreting results and communicating that information with the patient/family/caregiver and care coordination  Follow Up   Return in about 3 months (around 6/14/2023).  Patient was given instructions and counseling regarding her condition or for health maintenance advice. Please see specific information pulled into the AVS if appropriate.       Saloni Aguirre, APRN  03/14/23  14:51 EDT

## 2023-03-14 NOTE — TELEPHONE ENCOUNTER
Pharmacy called wanted to change   metformin 1000 mg to  metformin 500 ER  take 4 tablets daily. Saloni approved this and I called it in.

## 2023-04-12 ENCOUNTER — TELEPHONE (OUTPATIENT)
Dept: INTERNAL MEDICINE | Facility: CLINIC | Age: 43
End: 2023-04-12

## 2023-04-12 DIAGNOSIS — N89.8 VAGINAL IRRITATION: Primary | ICD-10-CM

## 2023-04-12 NOTE — TELEPHONE ENCOUNTER
Caller: Sharmaine Torres    Relationship: Self    Best call back number: [phone]    What medication are you requesting: SOMETHING TO TREAT YEAST INFECTION    What are your current symptoms: GENITAL SWELLING, SORENESS, LIGHT DISCHARGE, ITCHING AND DRYNESS    How long have you been experiencing symptoms: SINCE 04.08.2023    Have you had these symptoms before:    [x] Yes  [] No    Have you been treated for these symptoms before:   [x] Yes  [] No    If a prescription is needed, what is your preferred pharmacy and phone number: [address] [phone]  - [phone] FX     Additional notes:  PATIENT BELIEVES THAT SHE HAS A YEAST INFECTION AND THAT IT IS A SIDE EFFECT OF THE JARDIANCE MEDICATION THAT SHE WAS RECENTLY PRESCRIBED.     PATIENT STATES THAT A DETAILED VOICE MESSAGE CAN BE LEFT.

## 2023-04-13 RX ORDER — FLUCONAZOLE 150 MG/1
150 TABLET ORAL ONCE
Qty: 2 TABLET | Refills: 0 | Status: SHIPPED | OUTPATIENT
Start: 2023-04-13 | End: 2023-04-13

## 2023-04-13 NOTE — TELEPHONE ENCOUNTER
I have sent in two Diflucan pills for her. Take one today and then take the other 3 days from now if no improvement. If symptoms return, you need to make a follow up appointment to discuss possibly changing the jardiance. Make sure you're monitoring your blood sugar in the meantime.

## 2023-07-27 ENCOUNTER — PATIENT OUTREACH (OUTPATIENT)
Dept: CASE MANAGEMENT | Facility: OTHER | Age: 43
End: 2023-07-27
Payer: MEDICAID

## 2023-07-27 DIAGNOSIS — E11.8 TYPE 2 DIABETES MELLITUS WITH COMPLICATION: Primary | ICD-10-CM

## 2023-07-27 NOTE — OUTREACH NOTE
AMBULATORY CASE MANAGEMENT NOTE    Name and Relationship of Patient/Support Person: Sharmaine Torres - Self    Patient Outreach    Spoke with patient regarding CCM program - patient interested, however has not seen her PCP since 11/10/2022.     Assisted with scheduling patient follow up appointment for 8/7/23.     ACM to follow up with patient in person during PCP appointment to go over CCM program and consent. Patient agreeable to plan.           Juana RODRIGUEZ  Ambulatory Case Management    7/27/2023, 11:57 EDT

## 2023-08-07 ENCOUNTER — OFFICE VISIT (OUTPATIENT)
Dept: INTERNAL MEDICINE | Facility: CLINIC | Age: 43
End: 2023-08-07
Payer: MEDICAID

## 2023-08-07 VITALS
HEIGHT: 69 IN | SYSTOLIC BLOOD PRESSURE: 112 MMHG | HEART RATE: 87 BPM | OXYGEN SATURATION: 96 % | RESPIRATION RATE: 15 BRPM | TEMPERATURE: 97.6 F | WEIGHT: 207 LBS | DIASTOLIC BLOOD PRESSURE: 78 MMHG | BODY MASS INDEX: 30.66 KG/M2

## 2023-08-07 DIAGNOSIS — F41.1 GENERALIZED ANXIETY DISORDER: ICD-10-CM

## 2023-08-07 DIAGNOSIS — E78.2 MIXED HYPERLIPIDEMIA: ICD-10-CM

## 2023-08-07 DIAGNOSIS — F90.9 ATTENTION DEFICIT HYPERACTIVITY DISORDER (ADHD), UNSPECIFIED ADHD TYPE: ICD-10-CM

## 2023-08-07 DIAGNOSIS — E03.9 HYPOTHYROIDISM, UNSPECIFIED TYPE: ICD-10-CM

## 2023-08-07 DIAGNOSIS — E11.65 TYPE 2 DIABETES MELLITUS WITH HYPERGLYCEMIA, WITHOUT LONG-TERM CURRENT USE OF INSULIN: Primary | ICD-10-CM

## 2023-08-07 DIAGNOSIS — E11.65 TYPE 2 DIABETES MELLITUS WITH HYPERGLYCEMIA, WITHOUT LONG-TERM CURRENT USE OF INSULIN: ICD-10-CM

## 2023-08-07 DIAGNOSIS — E11.8 TYPE 2 DIABETES MELLITUS WITH COMPLICATION: ICD-10-CM

## 2023-08-07 PROCEDURE — 3046F HEMOGLOBIN A1C LEVEL >9.0%: CPT | Performed by: PHYSICIAN ASSISTANT

## 2023-08-07 PROCEDURE — 1160F RVW MEDS BY RX/DR IN RCRD: CPT | Performed by: PHYSICIAN ASSISTANT

## 2023-08-07 PROCEDURE — 1159F MED LIST DOCD IN RCRD: CPT | Performed by: PHYSICIAN ASSISTANT

## 2023-08-07 PROCEDURE — 99214 OFFICE O/P EST MOD 30 MIN: CPT | Performed by: PHYSICIAN ASSISTANT

## 2023-08-07 RX ORDER — ATORVASTATIN CALCIUM 10 MG/1
10 TABLET, FILM COATED ORAL DAILY
Qty: 90 TABLET | Refills: 1 | Status: SHIPPED | OUTPATIENT
Start: 2023-08-07

## 2023-08-07 RX ORDER — EMPAGLIFLOZIN, METFORMIN HYDROCHLORIDE 12.5; 1 MG/1; MG/1
2 TABLET, EXTENDED RELEASE ORAL DAILY
Qty: 60 TABLET | Refills: 3 | Status: SHIPPED | OUTPATIENT
Start: 2023-08-07

## 2023-08-07 RX ORDER — BUPROPION HYDROCHLORIDE 100 MG/1
100 TABLET, EXTENDED RELEASE ORAL 2 TIMES DAILY
Qty: 60 TABLET | Refills: 2 | Status: SHIPPED | OUTPATIENT
Start: 2023-08-07

## 2023-08-07 RX ORDER — LISINOPRIL 2.5 MG/1
2.5 TABLET ORAL DAILY
Qty: 90 TABLET | Refills: 1 | Status: SHIPPED | OUTPATIENT
Start: 2023-08-07

## 2023-08-07 RX ORDER — BUSPIRONE HYDROCHLORIDE 10 MG/1
10 TABLET ORAL NIGHTLY
Qty: 180 TABLET | Refills: 1 | Status: SHIPPED | OUTPATIENT
Start: 2023-08-07

## 2023-08-07 RX ORDER — SEMAGLUTIDE 1.34 MG/ML
0.25 INJECTION, SOLUTION SUBCUTANEOUS WEEKLY
Qty: 1.5 ML | Refills: 1 | Status: SHIPPED | OUTPATIENT
Start: 2023-08-07

## 2023-08-07 RX ORDER — LANCETS 28 GAUGE
EACH MISCELLANEOUS
Qty: 100 EACH | Refills: 12 | Status: SHIPPED | OUTPATIENT
Start: 2023-08-07

## 2023-08-07 NOTE — PROGRESS NOTES
"Chief Complaint  Diabetes, Anxiety, ADD, and foot pain    Subjective          Sharmaine Torres presents to [facility]  History of Present Illness  DM: pt stopped all medicines  BG has been 300s.  Metformin hurt her stomach  Stopped Lisinopril   Denies chest pain, palpitations, ha, dizziness, sob  Pt states she was previously on ozempic which helped her sugars a lot     HLD: Stopped atorvastatin      ADHD: she did not follow up with psych yet, she has not scheduled an appt  She is interested in trying a non stimulant    Anxiety: well controlled with buspar  Denies si/hi      Past Medical History:   Diagnosis Date    Anxiety     Depression     Diabetes mellitus     Endometriosis     Hyperlipidemia     Hypothyroidism     Obesity         Past Surgical History:   Procedure Laterality Date    APPENDECTOMY       SECTION      CHOLECYSTECTOMY      HAND SURGERY      HYSTERECTOMY      TUBAL ABDOMINAL LIGATION          Current Outpatient Medications on File Prior to Visit   Medication Sig Dispense Refill    Blood Glucose Monitoring Suppl w/Device kit Dispense 1 kit with monitor to check blood sugar once daily. Diagnosis: DM E11.65 1 each 0     No current facility-administered medications on file prior to visit.        Allergies   Allergen Reactions    Codeine Nausea And Vomiting     Pt stated tylenol codeine       Social History     Tobacco Use   Smoking Status Never   Smokeless Tobacco Never          Objective   Vital Signs:   /78 (BP Location: Left arm, Patient Position: Sitting, Cuff Size: Adult)   Pulse 87   Temp 97.6 øF (36.4 øC) (Temporal)   Resp 15   Ht 175.3 cm (69\")   Wt 93.9 kg (207 lb)   SpO2 96%   BMI 30.57 kg/mý     Physical Exam  Vitals reviewed.   Constitutional:       Appearance: Normal appearance.   HENT:      Head: Normocephalic and atraumatic.      Nose: Nose normal.      Mouth/Throat:      Mouth: Mucous membranes " are moist.   Eyes:      Extraocular Movements: Extraocular movements intact.      Conjunctiva/sclera: Conjunctivae normal.      Pupils: Pupils are equal, round, and reactive to light.   Cardiovascular:      Rate and Rhythm: Normal rate and regular rhythm.   Pulmonary:      Effort: Pulmonary effort is normal.      Breath sounds: Normal breath sounds.   Abdominal:      General: Abdomen is flat. Bowel sounds are normal.      Palpations: Abdomen is soft.   Musculoskeletal:         General: Normal range of motion.   Neurological:      General: No focal deficit present.      Mental Status: She is alert and oriented to person, place, and time.   Psychiatric:         Mood and Affect: Mood normal.      Result Review :            BMI is >= 30 and <35. (Class 1 Obesity). The following options were offered after discussion;: weight loss educational material (shared in after visit summary)              Assessment and Plan    Diagnoses and all orders for this visit:    1. Type 2 diabetes mellitus with hyperglycemia, without long-term current use of insulin (Primary)  -     Comprehensive Metabolic Panel  -     CBC & Differential  -     TSH  -     Hemoglobin A1c  -     atorvastatin (LIPITOR) 10 MG tablet; Take 1 tablet by mouth Daily.  Dispense: 90 tablet; Refill: 1  -     lisinopril (Zestril) 2.5 MG tablet; Take 1 tablet by mouth Daily.  Dispense: 90 tablet; Refill: 1  -     busPIRone (BUSPAR) 10 MG tablet; Take 1 tablet by mouth Every Night.  Dispense: 180 tablet; Refill: 1  -     Ambulatory Referral to Diabetic Education    2. Mixed hyperlipidemia  Comments:  Discussed cardiac protection of statin, will restart  Orders:  -     Lipid Panel    3. Hypothyroidism, unspecified type  -     TSH  -     T4, free    4. Type 2 diabetes mellitus with hyperglycemia, without long-term current use of insulin  Comments:  Most likely expected for A1c to increase.  Patient has been taking metformin intermittently.  Discussed medication  compliance  Orders:  -     Comprehensive Metabolic Panel  -     CBC & Differential  -     TSH  -     Hemoglobin A1c  -     atorvastatin (LIPITOR) 10 MG tablet; Take 1 tablet by mouth Daily.  Dispense: 90 tablet; Refill: 1  -     lisinopril (Zestril) 2.5 MG tablet; Take 1 tablet by mouth Daily.  Dispense: 90 tablet; Refill: 1  -     busPIRone (BUSPAR) 10 MG tablet; Take 1 tablet by mouth Every Night.  Dispense: 180 tablet; Refill: 1  -     Ambulatory Referral to Diabetic Education    5. Type 2 diabetes mellitus with complication  Assessment & Plan:  Risks of blood sugar elevation include death, heart attack, stroke, kidney disease, blindness. Discussed importance of medication compliance and not missing doses. Will start Synjardy to see if better tolerated. Will restart Ozempic. We will monitor at follow up and adjust medications if still elevated. To er if chest pain, palpitations, vision loss, unilateral weakness, altered mental status. Pt understands and agrees with plan.        6. Generalized anxiety disorder  Assessment & Plan:  Cont buspar. Will monitor for worsening with use of Wellbutrin for ADHD      7. Attention deficit hyperactivity disorder (ADHD), unspecified ADHD type  Comments:  Discussed f/u with psych, given contact info from referral. Will start wellbutrin today. Discussed side effects. Will monitor anxiety. Rtc 1 month.    Other orders  -     glucose blood test strip; Check blood sugar once daily in the morning  Dispense: 100 each; Refill: 12  -     Lancets (freestyle) lancets; Check blood sugar once daily in the morning  Dispense: 100 each; Refill: 12  -     Semaglutide,0.25 or 0.5MG/DOS, (Ozempic, 0.25 or 0.5 MG/DOSE,) 2 MG/1.5ML solution pen-injector; Inject 0.25 mg under the skin into the appropriate area as directed 1 (One) Time Per Week.  Dispense: 1.5 mL; Refill: 1  -     Empagliflozin-metFORMIN HCl ER (Synjardy XR) 12.5-1000 MG tablet sustained-release 24 hour; Take 2 tablets by mouth  Daily.  Dispense: 60 tablet; Refill: 3  -     buPROPion SR (Wellbutrin SR) 100 MG 12 hr tablet; Take 1 tablet by mouth 2 (Two) Times a Day.  Dispense: 60 tablet; Refill: 2        Follow Up   Return in about 1 month (around 9/7/2023).  Patient was given instructions and counseling regarding her condition or for health maintenance advice. Please see specific information pulled into the AVS if appropriate.

## 2023-08-08 NOTE — ASSESSMENT & PLAN NOTE
Risks of blood sugar elevation include death, heart attack, stroke, kidney disease, blindness. Discussed importance of medication compliance and not missing doses. Will start Synjardy to see if better tolerated. Will restart Ozempic. We will monitor at follow up and adjust medications if still elevated. To er if chest pain, palpitations, vision loss, unilateral weakness, altered mental status. Pt understands and agrees with plan.

## 2023-08-08 NOTE — PATIENT INSTRUCTIONS
Preventing Diabetes Mellitus Complications  You can help to prevent or slow down problems that are caused by diabetes (diabetes mellitus). Following your diabetes plan and taking care of yourself can reduce your risk of serious or life-threatening complications.  What actions can I take to prevent diabetes complications?  Diabetes management    Follow instructions from your health care providers about managing your diabetes. Your diabetes may be managed by a team of health care providers who can teach you how to care for yourself and can answer questions that you have.  Educate yourself about your condition so you can make healthy choices about eating and physical activity.  Know your target range for your blood sugar (glucose), and check your blood glucose level as often as told. Your health care provider will help you decide how often to check your blood glucose level depending on your treatment goals and how well you are meeting them.  Ask your health care provider if you should take low-dose aspirin daily and what dose is recommended for you. Taking low-dose aspirin daily is recommended to help prevent cardiovascular disease.  Controlling your blood pressure and cholesterol  Your personal target blood pressure is determined based on:  Your age.  Your medicines.  How long you have had diabetes.  Any other medical conditions you have.  To control your blood pressure:  Follow instructions from your health care provider about meal planning, exercise, and medicines.  Make sure your health care provider checks your blood pressure at every medical visit.  Monitor your blood pressure at home as told by your health care provider.  To control your cholesterol:  Follow instructions from your health care provider about meal planning, exercise, and medicines.  Have your cholesterol checked at least once a year.  You may be prescribed medicine to lower cholesterol (statin). If you are not taking a statin, ask your health care  provider if you should be.  Controlling your cholesterol may:  Help prevent heart disease and stroke. These are the most common health problems for people with diabetes.  Improve your blood flow.    Medical appointments and vaccines  Schedule and keep yearly physical exams and eye exams. Your health care provider will tell you how often you need medical visits depending on your diabetes management plan. Keep all follow-up visits as told. This is important so possible problems can be identified early and complications can be avoided or treated.  Every visit with your health care provider should include measuring your:  Weight.  Blood pressure.  Blood glucose control.  Your A1C (hemoglobin A1C) level should be checked:  At least 2 times a year, if you are meeting your treatment goals.  4 times a year, if you are not meeting treatment goals or if your treatment goals have changed.  Your blood lipids (lipid profile) should be checked yearly. You should also be checked yearly for protein in your urine (urine microalbumin).  If you have type 1 diabetes, get an eye exam 3-5 years after you are diagnosed, and then once a year after your first exam.  If you have type 2 diabetes, get an eye exam as soon as you are diagnosed, and then once a year after your first exam.  It is also important to keep your vaccines current. It is recommended that you receive:  A flu (influenza) vaccine every year.  A pneumonia (pneumococcal) vaccine and a hepatitis B vaccine. If you are age 65 or older, you may get the pneumonia vaccine as a series of two separate shots.  Ask your health care provider which other vaccines may be recommended.  Lifestyle  Do not use any products that contain nicotine or tobacco, such as cigarettes, e-cigarettes, and chewing tobacco. If you need help quitting, ask your health care provider. By avoiding nicotine and tobacco:  You will lower your risk for heart attack, stroke, nerve disease, and kidney disease.  Your  cholesterol and blood pressure may improve.  Your blood circulation will improve.  If you drink alcohol:  Limit how much you use to:  0-1 drink a day for women who are not pregnant.  0-2 drinks a day for men.  Be aware of how much alcohol is in your drink. In the [place] one drink equals one 12 oz bottle of beer (355 mL), one 5 oz glass of wine (148 mL), or one 11?2 oz glass of hard liquor (44 mL).  Taking care of your feet  Diabetes may cause you to have poor blood circulation to your legs and feet. Because of this, taking care of your feet is very important. Diabetes can cause:  The skin on the feet to get thinner, break more easily, and heal more slowly.  Nerve damage in your legs and feet, which results in decreased feeling. You may not notice minor injuries that could lead to serious problems.  To avoid foot problems:  Check your skin and feet every day for cuts, bruises, redness, blisters, or sores.  Schedule a foot exam with your health care provider once every year. This exam includes:  Inspecting the structure and skin of your feet.  Checking the pulses and sensation in your feet.  Make sure that your health care provider performs a visual foot exam at every medical visit.    Taking care of your teeth  People with poorly controlled diabetes are more likely to have gum (periodontal) disease. Diabetes can make periodontal diseases harder to control. If not treated, periodontal diseases can lead to tooth loss. To prevent this:  Brush your teeth twice a day.  Floss at least once a day.  Visit your dentist 2 times a year.  Managing stress  Living with diabetes can be stressful. When you are experiencing stress, your blood glucose may be affected in two ways:  Stress hormones may cause your blood glucose to rise.  You may be distracted from taking good care of yourself.  Be aware of your stress level and make changes to help you manage challenging situations. To lower your stress levels:  Consider joining a support  group.  Do planned relaxation or meditation.  Do a hobby that you enjoy.  Maintain healthy relationships.  Exercise regularly.  Work with your health care provider or a mental health professional.  Where to find more information  American Diabetes Association: www.diabetes.org  Association of Diabetes Care and Education Specialists: www.diabeteseducator.org  Summary  You can take action to prevent or slow down problems that are caused by diabetes (diabetes mellitus). Following your diabetes plan and taking care of yourself can reduce your risk of serious or life-threatening complications.  Follow instructions from your health care providers about managing your diabetes. Your diabetes may be managed by a team of health care providers who can teach you how to care for yourself and can answer questions that you have.  Know your target range for your blood sugar (glucose), and check your blood glucose levels as often as told. Your health care provider will help you decide how often you should check your blood glucose level depending on your treatment goals and how well you are meeting them.  Your health care provider will tell you how often you need medical visits depending on your diabetes management plan. Keep all follow-up visits as directed. This is important so possible problems can be identified early and complications can be avoided or treated.  This information is not intended to replace advice given to you by your health care provider. Make sure you discuss any questions you have with your health care provider.  Document Revised: 02/05/2021 Document Reviewed: 02/05/2021  Elsevier Patient Education c 2023 [employer]

## 2023-08-11 ENCOUNTER — PRIOR AUTHORIZATION (OUTPATIENT)
Dept: INTERNAL MEDICINE | Facility: CLINIC | Age: 43
End: 2023-08-11
Payer: MEDICAID

## 2023-08-11 NOTE — TELEPHONE ENCOUNTER
The request has been approved. The authorization is effective from 08/11/2023 to 08/10/2024, as long as the member is enrolled in their current health plan. The request was approved with a quantity restriction. This has been approved for a max daily dosage of 2. A written notification letter will follow with additional details.

## 2023-09-18 ENCOUNTER — OFFICE VISIT (OUTPATIENT)
Dept: INTERNAL MEDICINE | Facility: CLINIC | Age: 43
End: 2023-09-18
Payer: MEDICAID

## 2023-09-18 VITALS
HEART RATE: 84 BPM | OXYGEN SATURATION: 97 % | RESPIRATION RATE: 15 BRPM | WEIGHT: 205 LBS | TEMPERATURE: 97.5 F | DIASTOLIC BLOOD PRESSURE: 88 MMHG | HEIGHT: 69 IN | SYSTOLIC BLOOD PRESSURE: 130 MMHG | BODY MASS INDEX: 30.36 KG/M2

## 2023-09-18 DIAGNOSIS — F41.1 GENERALIZED ANXIETY DISORDER: ICD-10-CM

## 2023-09-18 DIAGNOSIS — F90.9 ATTENTION DEFICIT HYPERACTIVITY DISORDER (ADHD), UNSPECIFIED ADHD TYPE: ICD-10-CM

## 2023-09-18 DIAGNOSIS — M62.9 LESION OF SKELETAL MUSCLE: ICD-10-CM

## 2023-09-18 DIAGNOSIS — N80.9 ENDOMETRIOSIS: ICD-10-CM

## 2023-09-18 DIAGNOSIS — E11.65 TYPE 2 DIABETES MELLITUS WITH HYPERGLYCEMIA, WITHOUT LONG-TERM CURRENT USE OF INSULIN: Primary | ICD-10-CM

## 2023-09-18 DIAGNOSIS — R10.9 ABDOMINAL PAIN, UNSPECIFIED ABDOMINAL LOCATION: ICD-10-CM

## 2023-09-18 DIAGNOSIS — E78.2 MIXED HYPERLIPIDEMIA: ICD-10-CM

## 2023-09-18 LAB
ALBUMIN SERPL-MCNC: 4.9 G/DL (ref 3.5–5.2)
ALBUMIN/GLOB SERPL: 1.7 G/DL
ALP SERPL-CCNC: 131 U/L (ref 39–117)
ALT SERPL W P-5'-P-CCNC: 17 U/L (ref 1–33)
ANION GAP SERPL CALCULATED.3IONS-SCNC: 13.4 MMOL/L (ref 5–15)
AST SERPL-CCNC: 19 U/L (ref 1–32)
BASOPHILS # BLD AUTO: 0.08 10*3/MM3 (ref 0–0.2)
BASOPHILS NFR BLD AUTO: 1.1 % (ref 0–1.5)
BILIRUB SERPL-MCNC: 0.2 MG/DL (ref 0–1.2)
BUN SERPL-MCNC: 11 MG/DL (ref 6–20)
BUN/CREAT SERPL: 18.3 (ref 7–25)
CALCIUM SPEC-SCNC: 9.7 MG/DL (ref 8.6–10.5)
CHLORIDE SERPL-SCNC: 97 MMOL/L (ref 98–107)
CHOLEST SERPL-MCNC: 271 MG/DL (ref 0–200)
CO2 SERPL-SCNC: 22.6 MMOL/L (ref 22–29)
CREAT SERPL-MCNC: 0.6 MG/DL (ref 0.57–1)
DEPRECATED RDW RBC AUTO: 40.6 FL (ref 37–54)
EGFRCR SERPLBLD CKD-EPI 2021: 115.8 ML/MIN/1.73
EOSINOPHIL # BLD AUTO: 0.18 10*3/MM3 (ref 0–0.4)
EOSINOPHIL NFR BLD AUTO: 2.4 % (ref 0.3–6.2)
ERYTHROCYTE [DISTWIDTH] IN BLOOD BY AUTOMATED COUNT: 12.3 % (ref 12.3–15.4)
GLOBULIN UR ELPH-MCNC: 2.9 GM/DL
GLUCOSE SERPL-MCNC: 367 MG/DL (ref 65–99)
HBA1C MFR BLD: 10.5 % (ref 4.8–5.6)
HCT VFR BLD AUTO: 44.7 % (ref 34–46.6)
HDLC SERPL-MCNC: 38 MG/DL (ref 40–60)
HGB BLD-MCNC: 15.1 G/DL (ref 12–15.9)
IMM GRANULOCYTES # BLD AUTO: 0.01 10*3/MM3 (ref 0–0.05)
IMM GRANULOCYTES NFR BLD AUTO: 0.1 % (ref 0–0.5)
LDLC SERPL CALC-MCNC: 130 MG/DL (ref 0–100)
LDLC/HDLC SERPL: 3.17 {RATIO}
LYMPHOCYTES # BLD AUTO: 2.75 10*3/MM3 (ref 0.7–3.1)
LYMPHOCYTES NFR BLD AUTO: 37.4 % (ref 19.6–45.3)
MCH RBC QN AUTO: 30.5 PG (ref 26.6–33)
MCHC RBC AUTO-ENTMCNC: 33.8 G/DL (ref 31.5–35.7)
MCV RBC AUTO: 90.3 FL (ref 79–97)
MONOCYTES # BLD AUTO: 0.53 10*3/MM3 (ref 0.1–0.9)
MONOCYTES NFR BLD AUTO: 7.2 % (ref 5–12)
NEUTROPHILS NFR BLD AUTO: 3.8 10*3/MM3 (ref 1.7–7)
NEUTROPHILS NFR BLD AUTO: 51.8 % (ref 42.7–76)
NRBC BLD AUTO-RTO: 0 /100 WBC (ref 0–0.2)
PLATELET # BLD AUTO: 284 10*3/MM3 (ref 140–450)
PMV BLD AUTO: 11.8 FL (ref 6–12)
POTASSIUM SERPL-SCNC: 4.6 MMOL/L (ref 3.5–5.2)
PROT SERPL-MCNC: 7.8 G/DL (ref 6–8.5)
RBC # BLD AUTO: 4.95 10*6/MM3 (ref 3.77–5.28)
SODIUM SERPL-SCNC: 133 MMOL/L (ref 136–145)
T4 FREE SERPL-MCNC: 1.02 NG/DL (ref 0.93–1.7)
TRIGL SERPL-MCNC: 563 MG/DL (ref 0–150)
TSH SERPL DL<=0.05 MIU/L-ACNC: 3.02 UIU/ML (ref 0.27–4.2)
VLDLC SERPL-MCNC: 103 MG/DL (ref 5–40)
WBC NRBC COR # BLD: 7.35 10*3/MM3 (ref 3.4–10.8)

## 2023-09-18 PROCEDURE — 83036 HEMOGLOBIN GLYCOSYLATED A1C: CPT | Performed by: PHYSICIAN ASSISTANT

## 2023-09-18 PROCEDURE — 80061 LIPID PANEL: CPT | Performed by: PHYSICIAN ASSISTANT

## 2023-09-18 PROCEDURE — 80050 GENERAL HEALTH PANEL: CPT | Performed by: PHYSICIAN ASSISTANT

## 2023-09-18 PROCEDURE — 84439 ASSAY OF FREE THYROXINE: CPT | Performed by: PHYSICIAN ASSISTANT

## 2023-09-18 NOTE — PROGRESS NOTES
"Chief Complaint  Diabetes, anxiety, stomach pain    Subjective          Sharmaine Torres presents to [facility]  History of Present Illness  Pt here with numerous complaints    Diabetes: Pt has been taking Ozempic and just started Synjardy  She is not checking bg at home   She has not had issue with nausea since the first 2 weeks    ADHD: pt states Wellbutrin has helped ADHD but feels mood has worsened since starting  Feels more \"ansy\" and \"mean\"   Denies si/hi   Denies feeling down, sad  Admits to a few panic attacks which Buspar helps.    Muscle lesion: noted on CT abd/pel in   States has had issues with endometriosis but was told the lesion is in her abdominal rectus muscle.  She has been having pain that comes and goes now. Would like to get re-imaging.  Denies dysuria, frequency, urgency  Denies NVDC    Past Medical History:   Diagnosis Date    Anxiety     Depression     Diabetes mellitus     Endometriosis     Hyperlipidemia     Hypothyroidism     Obesity         Past Surgical History:   Procedure Laterality Date    APPENDECTOMY       SECTION      CHOLECYSTECTOMY      HAND SURGERY      HYSTERECTOMY      TUBAL ABDOMINAL LIGATION          Current Outpatient Medications on File Prior to Visit   Medication Sig Dispense Refill    atorvastatin (LIPITOR) 10 MG tablet Take 1 tablet by mouth Daily. 90 tablet 1    Blood Glucose Monitoring Suppl w/Device kit Dispense 1 kit with monitor to check blood sugar once daily. Diagnosis: DM E11.65 1 each 0    buPROPion SR (Wellbutrin SR) 100 MG 12 hr tablet Take 1 tablet by mouth 2 (Two) Times a Day. 60 tablet 2    busPIRone (BUSPAR) 10 MG tablet Take 1 tablet by mouth Every Night. 180 tablet 1    Empagliflozin-metFORMIN HCl ER (Synjardy XR) 12.5-1000 MG tablet sustained-release 24 hour Take 2 tablets by mouth Daily. 60 tablet 3    glucose blood test strip Check blood sugar once daily in the " "morning 100 each 12    Lancets (freestyle) lancets Check blood sugar once daily in the morning 100 each 12    lisinopril (Zestril) 2.5 MG tablet Take 1 tablet by mouth Daily. 90 tablet 1    Semaglutide,0.25 or 0.5MG/DOS, (Ozempic, 0.25 or 0.5 MG/DOSE,) 2 MG/1.5ML solution pen-injector Inject 0.25 mg under the skin into the appropriate area as directed 1 (One) Time Per Week. 1.5 mL 1     No current facility-administered medications on file prior to visit.        Allergies   Allergen Reactions    Codeine Nausea And Vomiting     Pt stated tylenol codeine       Social History     Tobacco Use   Smoking Status Never   Smokeless Tobacco Never          Objective   Vital Signs:   /88 (BP Location: Left arm, Patient Position: Sitting, Cuff Size: Adult)   Pulse 84   Temp 97.5 °F (36.4 °C) (Temporal)   Resp 15   Ht 175.3 cm (69\")   Wt 93 kg (205 lb)   SpO2 97%   BMI 30.27 kg/m²     Physical Exam  Vitals reviewed.   Constitutional:       Appearance: Normal appearance.   HENT:      Head: Normocephalic and atraumatic.      Nose: Nose normal.      Mouth/Throat:      Mouth: Mucous membranes are moist.   Eyes:      Extraocular Movements: Extraocular movements intact.      Conjunctiva/sclera: Conjunctivae normal.      Pupils: Pupils are equal, round, and reactive to light.   Cardiovascular:      Rate and Rhythm: Normal rate and regular rhythm.   Pulmonary:      Effort: Pulmonary effort is normal.      Breath sounds: Normal breath sounds.   Abdominal:      General: Abdomen is flat. Bowel sounds are normal.      Palpations: Abdomen is soft.   Musculoskeletal:         General: Normal range of motion.   Neurological:      General: No focal deficit present.      Mental Status: She is alert and oriented to person, place, and time.   Psychiatric:         Mood and Affect: Mood normal.      Result Review :                            Assessment and Plan    Diagnoses and all orders for this visit:    1. Type 2 diabetes mellitus with " hyperglycemia, without long-term current use of insulin (Primary)    2. Mixed hyperlipidemia  Comments:  Labs today.    3. Generalized anxiety disorder    4. Attention deficit hyperactivity disorder (ADHD), unspecified ADHD type  Comments:  Mood not well controlled. Would reccomend follow up with psych as we discussed at prior visits. To er if si/hi    5. Endometriosis  -     CT Abdomen Pelvis With & Without Contrast; Future    6. Lesion of skeletal muscle  Comments:  Will get updated imaging and refer to general surgery if indicated.  Orders:  -     CT Abdomen Pelvis With & Without Contrast; Future    7. Abdominal pain, unspecified abdominal location  -     CT Abdomen Pelvis With & Without Contrast; Future        Follow Up   Return in about 1 month (around 10/18/2023).  Patient was given instructions and counseling regarding her condition or for health maintenance advice. Please see specific information pulled into the AVS if appropriate.

## 2023-09-20 DIAGNOSIS — E78.2 MIXED HYPERLIPIDEMIA: Primary | ICD-10-CM

## 2023-09-20 DIAGNOSIS — E11.65 TYPE 2 DIABETES MELLITUS WITH HYPERGLYCEMIA, WITHOUT LONG-TERM CURRENT USE OF INSULIN: ICD-10-CM

## 2023-09-20 DIAGNOSIS — F41.1 GENERALIZED ANXIETY DISORDER: ICD-10-CM

## 2023-09-20 RX ORDER — ATORVASTATIN CALCIUM 40 MG/1
40 TABLET, FILM COATED ORAL DAILY
Qty: 90 TABLET | Refills: 1 | Status: SHIPPED | OUTPATIENT
Start: 2023-09-20

## 2023-09-20 NOTE — ASSESSMENT & PLAN NOTE
Risks of blood sugar elevation include death, heart attack, stroke, kidney disease, blindness. Discussed importance of medication compliance and not missing doses. Continue current medications at this time. Labs today. Will adjust medicine if indicated based on results. We will monitor at follow up and adjust medications if still elevated. To er if chest pain, palpitations, vision loss, unilateral weakness, altered mental status. Pt understands and agrees with plan.

## 2023-09-20 NOTE — PATIENT INSTRUCTIONS
Diabetes Mellitus and Nutrition, Adult  When you have diabetes, or diabetes mellitus, it is very important to have healthy eating habits because your blood sugar (glucose) levels are greatly affected by what you eat and drink. Eating healthy foods in the right amounts, at about the same times every day, can help you:  Manage your blood glucose.  Lower your risk of heart disease.  Improve your blood pressure.  Reach or maintain a healthy weight.  What can affect my meal plan?  Every person with diabetes is different, and each person has different needs for a meal plan. Your health care provider may recommend that you work with a dietitian to make a meal plan that is best for you. Your meal plan may vary depending on factors such as:  The calories you need.  The medicines you take.  Your weight.  Your blood glucose, blood pressure, and cholesterol levels.  Your activity level.  Other health conditions you have, such as heart or kidney disease.  How do carbohydrates affect me?  Carbohydrates, also called carbs, affect your blood glucose level more than any other type of food. Eating carbs raises the amount of glucose in your blood.  It is important to know how many carbs you can safely have in each meal. This is different for every person. Your dietitian can help you calculate how many carbs you should have at each meal and for each snack.  How does alcohol affect me?  Alcohol can cause a decrease in blood glucose (hypoglycemia), especially if you use insulin or take certain diabetes medicines by mouth. Hypoglycemia can be a life-threatening condition. Symptoms of hypoglycemia, such as sleepiness, dizziness, and confusion, are similar to symptoms of having too much alcohol.  Do not drink alcohol if:  Your health care provider tells you not to drink.  You are pregnant, may be pregnant, or are planning to become pregnant.  If you drink alcohol:  Limit how much you have to:  0-1 drink a day for women.  0-2 drinks a day  "for men.  Know how much alcohol is in your drink. In the [place] one drink equals one 12 oz bottle of beer (355 mL), one 5 oz glass of wine (148 mL), or one 1½ oz glass of hard liquor (44 mL).  Keep yourself hydrated with water, diet soda, or unsweetened iced tea. Keep in mind that regular soda, juice, and other mixers may contain a lot of sugar and must be counted as carbs.  What are tips for following this plan?    Reading food labels  Start by checking the serving size on the Nutrition Facts label of packaged foods and drinks. The number of calories and the amount of carbs, fats, and other nutrients listed on the label are based on one serving of the item. Many items contain more than one serving per package.  Check the total grams (g) of carbs in one serving.  Check the number of grams of saturated fats and trans fats in one serving. Choose foods that have a low amount or none of these fats.  Check the number of milligrams (mg) of salt (sodium) in one serving. Most people should limit total sodium intake to less than 2,300 mg per day.  Always check the nutrition information of foods labeled as \"low-fat\" or \"nonfat.\" These foods may be higher in added sugar or refined carbs and should be avoided.  Talk to your dietitian to identify your daily goals for nutrients listed on the label.  Shopping  Avoid buying canned, pre-made, or processed foods. These foods tend to be high in fat, sodium, and added sugar.  Shop around the outside edge of the grocery store. This is where you will most often find fresh fruits and vegetables, bulk grains, fresh meats, and fresh dairy products.  Cooking  Use low-heat cooking methods, such as baking, instead of high-heat cooking methods, such as deep frying.  Cook using healthy oils, such as olive, canola, or sunflower oil.  Avoid cooking with butter, cream, or high-fat meats.  Meal planning  Eat meals and snacks regularly, preferably at the same times every day. Avoid going long periods " of time without eating.  Eat foods that are high in fiber, such as fresh fruits, vegetables, beans, and whole grains.  Eat 4-6 oz (112-168 g) of lean protein each day, such as lean meat, chicken, fish, eggs, or tofu. One ounce (oz) (28 g) of lean protein is equal to:  1 oz (28 g) of meat, chicken, or fish.  1 egg.  ¼ cup (62 g) of tofu.  Eat some foods each day that contain healthy fats, such as avocado, nuts, seeds, and fish.  What foods should I eat?  Fruits  Berries. Apples. Oranges. Peaches. Apricots. Plums. Grapes. Mangoes. Papayas. Pomegranates. Kiwi. Cherries.  Vegetables  Leafy greens, including lettuce, spinach, kale, chard, josh greens, mustard greens, and cabbage. Beets. Cauliflower. Broccoli. Carrots. Green beans. Tomatoes. Peppers. Onions. Cucumbers. [place] sprouts.  Grains  Whole grains, such as whole-wheat or whole-grain bread, crackers, tortillas, cereal, and pasta. Unsweetened oatmeal. Quinoa. Brown or wild rice.  Meats and other proteins  Seafood. Poultry without skin. Lean cuts of poultry and beef. Tofu. Nuts. Seeds.  Dairy  Low-fat or fat-free dairy products such as milk, yogurt, and cheese.  The items listed above may not be a complete list of foods and beverages you can eat and drink. Contact a dietitian for more information.  What foods should I avoid?  Fruits  Fruits canned with syrup.  Vegetables  Canned vegetables. Frozen vegetables with butter or cream sauce.  Grains  Refined white flour and flour products such as bread, pasta, snack foods, and cereals. Avoid all processed foods.  Meats and other proteins  Fatty cuts of meat. Poultry with skin. Breaded or fried meats. Processed meat. Avoid saturated fats.  Dairy  Full-fat yogurt, cheese, or milk.  Beverages  Sweetened drinks, such as soda or iced tea.  The items listed above may not be a complete list of foods and beverages you should avoid. Contact a dietitian for more information.  Questions to ask a health care provider  Do I need  to meet with a certified diabetes care and ?  Do I need to meet with a dietitian?  What number can I call if I have questions?  When are the best times to check my blood glucose?  Where to find more information:  American Diabetes Association: diabetes.org  Academy of Nutrition and Dietetics: eatright.org  National [place] of Diabetes and Digestive and Kidney Diseases: niddk.nih.gov  Association of Diabetes Care & Education Specialists: diabeteseducator.org  Summary  It is important to have healthy eating habits because your blood sugar (glucose) levels are greatly affected by what you eat and drink. It is important to use alcohol carefully.  A healthy meal plan will help you manage your blood glucose and lower your risk of heart disease.  Your health care provider may recommend that you work with a dietitian to make a meal plan that is best for you.  This information is not intended to replace advice given to you by your health care provider. Make sure you discuss any questions you have with your health care provider.  Document Revised: 07/21/2021 Document Reviewed: 07/21/2021  Elsevier Patient Education © 2023 [employer]

## 2023-09-21 ENCOUNTER — REFERRAL TRIAGE (OUTPATIENT)
Dept: CASE MANAGEMENT | Facility: OTHER | Age: 43
End: 2023-09-21
Payer: MEDICAID

## 2023-09-21 ENCOUNTER — PATIENT OUTREACH (OUTPATIENT)
Dept: CASE MANAGEMENT | Facility: OTHER | Age: 43
End: 2023-09-21
Payer: MEDICAID

## 2023-09-21 ENCOUNTER — TELEPHONE (OUTPATIENT)
Dept: CASE MANAGEMENT | Facility: OTHER | Age: 43
End: 2023-09-21
Payer: MEDICAID

## 2023-09-21 DIAGNOSIS — E11.8 TYPE 2 DIABETES MELLITUS WITH COMPLICATION: Primary | ICD-10-CM

## 2023-09-21 NOTE — OUTREACH NOTE
AMBULATORY CASE MANAGEMENT NOTE    Name and Relationship of Patient/Support Person: Brian Sharmaine J - Self    CCM Interim Update    Patient was referred to CCM by PCP. Spoke with patient about Chronic Care Management Program and she is agreeable.   Patient had follow-up appointment with PCP 8/7, she changed her diabetes medication. She has not been taking Synjardy, and hasn't consistently been taking Ozempic, its upsetting her stomach and she has not been checking her blood sugars.   Went over all labs with her, made follow up appointment for Monday to discuss Levemir.   Patients Goal is to take her medication properly.   Patient is going to check fasting blood sugar in the morning, keeping a log. She is fixing a pill planner ,we went over all her medications, what they are, and when to take.   I'm going to mail her several different education papers on Diabetes and Cholesterol.  Talked about her [place] medicaid may have a fresh fruit and vegetable benefit, advised to call and ask about this.   Talked about need for yearly eye exam, the last she thought was about a year ago at [employer] [facility] [employer] [facility]. She had eye exam last 9/1/22, and diabetic eye exam 8/19/2020, requested those to be faxed to office.   PCP had referred patient to Dietician, however she never made an appointment, may be something to address in the future.   Also talked about free phone thais to download, MyFitness Pal, you can track your food, steps and water intake. She verbalized interest and was going to look into it.    Adult Patient Profile  Questions/Answers      Flowsheet Row Most Recent Value   Symptoms/Conditions Managed at Home diabetes, type 2, cardiovascular   Barriers to Managing Health medication side effects, stress of chronic illness, treatment side effects, other (see comments)   Cardiovascular Symptoms/Conditions high blood cholesterol   Cardiovascular Management Strategies activity, diet modification, exercise, medication therapy   Diabetes  Management Strategies activity, blood glucose testing, diet modification, exercise, insulin therapy, medication therapy, weight management   Frequency of Blood Glucose Testing other (see comments)  [Daily fasting in AM]   A1C Target <7%   Last A1C Result 9/18/2023- A1C 10.5%   Injection Schedule Lantus at Bedtime   Injection Site Rotate   Identifying Life Goals Take medications properly   Importance of Change 5   Taking Medications Not Prescribed no   Missed Doses of Prescribed Medications During Past Week yes  [Has not been taking some of her medications]   Taken Prescribed Medications at Different Time or Schedule During Past Week no   Taken More or Less Medication Than Prescribed yes   Barriers to Taking Medication as Prescribed none  [Just haven't]   Q1: How often do you have a drink containing alcohol? Monthly or l   Q2: How many drinks containing alcohol do you have on a typical day when you are drinking? 1 or 2   Q3: How often do you have six or more drinks on one occasion? Never   Audit-C Score 1        SDOH updated and reviewed with the patient during this program:  Financial Resource Strain: Low Risk     Difficulty of Paying Living Expenses: Not very hard      Physical Activity: Insufficiently Active    Days of Exercise per Week: 3 days    Minutes of Exercise per Session: 20 min      Food Insecurity: Food Insecurity Present    Worried About Running Out of Food in the Last Year: Sometimes true    Ran Out of Food in the Last Year: Sometimes true      Social Connections: Moderately Isolated    Frequency of Communication with Friends and Family: More than three times a week    Frequency of Social Gatherings with Friends and Family: Once a week    Attends [employer] Services: Never    Active Member of Clubs or Organizations: No    Attends Club or Organization Meetings: Never    Marital Status: Living with partner      Transportation Needs: No Transportation Needs    Lack of Transportation (Medical): No    Lack  of Transportation (Non-Medical): No      Housing Stability: Low Risk     Unable to Pay for Housing in the Last Year: No    Number of Places Lived in the Last Year: 1    Unstable Housing in the Last Year: No      Stress: Stress Concern Present    Feeling of Stress : Very much         Diabetes Education Documentation  Monitoring Carbohydrate Intake, taught by Piedad Harper, RN at 9/21/2023 12:33 PM.  Learner: Patient  Readiness: Acceptance  Method: Explanation  Response: Needs Reinforcement    Healthy Food Choices, taught by Piedad Harper, RN at 9/21/2023 12:33 PM.  Learner: Patient  Readiness: Acceptance  Method: Explanation  Response: Needs Reinforcement    Carbohydrate-Containing Foods, taught by Piedad Harper RN at 9/21/2023 12:33 PM.  Learner: Patient  Readiness: Acceptance  Method: Explanation  Response: Needs Reinforcement    Importance of Glycemic Management, taught by Piedad Harper RN at 9/21/2023 12:33 PM.  Learner: Patient  Readiness: Acceptance  Method: Explanation  Response: Needs Reinforcement    Blood Glucose Monitor Use, taught by Piedad Harper RN at 9/21/2023 12:33 PM.  Learner: Patient  Readiness: Acceptance  Method: Explanation  Response: Needs Reinforcement    Blood Glucose Monitoring, taught by Piedad Harper RN at 9/21/2023 12:33 PM.  Learner: Patient  Readiness: Acceptance  Method: Explanation  Response: Needs Reinforcement    Blood Glucose Goal, taught by Piedad Harper RN at 9/21/2023 12:33 PM.  Learner: Patient  Readiness: Acceptance  Method: Explanation  Response: Needs Reinforcement    Medication Management, taught by Piedad Harper RN at 9/21/2023 12:33 PM.  Learner: Patient  Readiness: Acceptance  Method: Explanation  Response: Needs Reinforcement    Hypoglycemia, taught by Piedad Harper RN at 9/21/2023 12:33 PM.  Learner: Patient  Readiness: Acceptance  Method: Explanation  Response: Needs Reinforcement    Hyperglycemia,  taught by Piedad Harper, RN at 9/21/2023 12:33 PM.  Learner: Patient  Readiness: Acceptance  Method: Explanation  Response: Needs Reinforcement    Blood Glucose Monitoring, taught by Piedad Harper, RN at 9/21/2023 12:33 PM.  Learner: Patient  Readiness: Acceptance  Method: Explanation  Response: Needs Reinforcement        Piedad FIERRO  Ambulatory Case Management  9/21/2023, 12:33 EDT

## 2023-09-21 NOTE — TELEPHONE ENCOUNTER
She is only doing 0.25 Ozempic, I will tell her to try and ride it out.     You only get 4 pen needles with Ozempic. You sent in Levemir vial, did you want her to have the Pens?      She needed a Monday or Tuesday appointment, I did Monday at 10:45, was that okay ?

## 2023-09-21 NOTE — TELEPHONE ENCOUNTER
Hello,   Ok to go ahead and send. If having nausea from ozempic, we need to decrease dose or d/c. I would not recommend treating the nausea with additional medicine. She can go ahead and start 10 units of Levemir at night. I sent over insulin and she should have pen needles from the ozempic. I can send more if needed. I sent you message, not sure if you saw, she can be added next week 9/27/23, just put her in a different appt slot than f/u.

## 2023-09-21 NOTE — TELEPHONE ENCOUNTER
Patient states the Ozempic has been causing a lot of nausea, do you think sending her Zofran would be beneficial until the side effects subside ?    I wanted to clarify the Levemir, did you want her to go ahead and start it now, or wait until she has the appointment with you ?  If you want her to go ahead and start, she will need the supplies. I wasn't sure what to do about the appointment you wanted her to have ASAP you don't have anything.   Please advise    Piedad Harper RN  Ambulatory Case Management  9/21/2023, 12:23 EDT

## 2023-09-22 RX ORDER — PEN NEEDLE, DIABETIC 29 G X1/2"
NEEDLE, DISPOSABLE MISCELLANEOUS
Qty: 100 EACH | Refills: 1 | Status: SHIPPED | OUTPATIENT
Start: 2023-09-22

## 2023-09-22 NOTE — TELEPHONE ENCOUNTER
I tried to send the pens 2x and when it is on my end it shows up as pen and when it is sent it shows as just units. Can you call pharmacy to make sure it is the pen. I sent more needles too. That appt time is fine.

## 2023-09-25 ENCOUNTER — OFFICE VISIT (OUTPATIENT)
Dept: INTERNAL MEDICINE | Facility: CLINIC | Age: 43
End: 2023-09-25

## 2023-09-25 VITALS
OXYGEN SATURATION: 97 % | DIASTOLIC BLOOD PRESSURE: 86 MMHG | TEMPERATURE: 97 F | HEART RATE: 86 BPM | HEIGHT: 69 IN | RESPIRATION RATE: 16 BRPM | WEIGHT: 205 LBS | BODY MASS INDEX: 30.36 KG/M2 | SYSTOLIC BLOOD PRESSURE: 128 MMHG

## 2023-09-25 DIAGNOSIS — F90.9 ATTENTION DEFICIT HYPERACTIVITY DISORDER (ADHD), UNSPECIFIED ADHD TYPE: ICD-10-CM

## 2023-09-25 DIAGNOSIS — E11.8 TYPE 2 DIABETES MELLITUS WITH COMPLICATION: ICD-10-CM

## 2023-09-25 DIAGNOSIS — E11.65 TYPE 2 DIABETES MELLITUS WITH HYPERGLYCEMIA, WITHOUT LONG-TERM CURRENT USE OF INSULIN: Primary | ICD-10-CM

## 2023-09-25 PROCEDURE — 1159F MED LIST DOCD IN RCRD: CPT | Performed by: PHYSICIAN ASSISTANT

## 2023-09-25 PROCEDURE — 1160F RVW MEDS BY RX/DR IN RCRD: CPT | Performed by: PHYSICIAN ASSISTANT

## 2023-09-25 PROCEDURE — 99213 OFFICE O/P EST LOW 20 MIN: CPT | Performed by: PHYSICIAN ASSISTANT

## 2023-09-25 PROCEDURE — 3046F HEMOGLOBIN A1C LEVEL >9.0%: CPT | Performed by: PHYSICIAN ASSISTANT

## 2023-09-25 NOTE — PROGRESS NOTES
"Chief Complaint  Diabetes    Subjective          Sharmaine Torres presents to [facility]  History of Present Illness    DM: Pt has not been taking anything for diabetes due to concern with side effects  Synjardy caused yeast infection in the past  Pt drinks a lot of diet soda.   Denies chest pain, palpitations, ha  BG running in 300s  Denies low bg  Pt interested in CGM    ADHD: pt states that Wellbutrin has made her \"angry\"   She no longer wants to take it    Past Medical History:   Diagnosis Date    Anxiety     Depression     Diabetes mellitus     Endometriosis     Hyperlipidemia     Hypothyroidism     Obesity         Past Surgical History:   Procedure Laterality Date    APPENDECTOMY  2005     SECTION      CHOLECYSTECTOMY      HAND SURGERY      HYSTERECTOMY      TUBAL ABDOMINAL LIGATION          Current Outpatient Medications on File Prior to Visit   Medication Sig Dispense Refill    atorvastatin (LIPITOR) 40 MG tablet Take 1 tablet by mouth Daily. 90 tablet 1    Blood Glucose Monitoring Suppl w/Device kit Dispense 1 kit with monitor to check blood sugar once daily. Diagnosis: DM E11.65 1 each 0    busPIRone (BUSPAR) 10 MG tablet Take 1 tablet by mouth Every Night. 180 tablet 1    glucose blood test strip Check blood sugar once daily in the morning 100 each 12    Injection Device for Insulin (B-D PEN) device For insulin and ozempic 100 each 1    insulin detemir (LEVEMIR) 100 UNIT/ML injection Inject 10 Units under the skin into the appropriate area as directed Daily. 30 mL 0    Lancets (freestyle) lancets Check blood sugar once daily in the morning 100 each 12    lisinopril (Zestril) 2.5 MG tablet Take 1 tablet by mouth Daily. 90 tablet 1    Semaglutide,0.25 or 0.5MG/DOS, (Ozempic, 0.25 or 0.5 MG/DOSE,) 2 MG/1.5ML solution pen-injector Inject 0.25 mg under the skin into the appropriate area as directed 1 (One) Time Per Week. 1.5 mL 1    " "[DISCONTINUED] buPROPion SR (Wellbutrin SR) 100 MG 12 hr tablet Take 1 tablet by mouth 2 (Two) Times a Day. 60 tablet 2    [DISCONTINUED] Empagliflozin-metFORMIN HCl ER (Synjardy XR) 12.5-1000 MG tablet sustained-release 24 hour Take 2 tablets by mouth Daily. 60 tablet 3     No current facility-administered medications on file prior to visit.        Allergies   Allergen Reactions    Codeine Nausea And Vomiting     Pt stated tylenol codeine       Social History     Tobacco Use   Smoking Status Never   Smokeless Tobacco Never          Objective   Vital Signs:   /86 (BP Location: Right arm, Patient Position: Sitting, Cuff Size: Adult)   Pulse 86   Temp 97 °F (36.1 °C) (Temporal)   Resp 16   Ht 175.3 cm (69\")   Wt 93 kg (205 lb)   SpO2 97%   BMI 30.27 kg/m²     Physical Exam  Vitals reviewed.   Constitutional:       Appearance: Normal appearance.   HENT:      Head: Normocephalic and atraumatic.      Nose: Nose normal.      Mouth/Throat:      Mouth: Mucous membranes are moist.   Eyes:      Extraocular Movements: Extraocular movements intact.      Conjunctiva/sclera: Conjunctivae normal.      Pupils: Pupils are equal, round, and reactive to light.   Cardiovascular:      Rate and Rhythm: Normal rate and regular rhythm.   Pulmonary:      Effort: Pulmonary effort is normal.      Breath sounds: Normal breath sounds.   Abdominal:      General: Abdomen is flat. Bowel sounds are normal.      Palpations: Abdomen is soft.   Musculoskeletal:         General: Normal range of motion.   Neurological:      General: No focal deficit present.      Mental Status: She is alert and oriented to person, place, and time.   Psychiatric:         Mood and Affect: Mood normal.      Result Review :                            Assessment and Plan    Diagnoses and all orders for this visit:    1. Type 2 diabetes mellitus with hyperglycemia, without long-term current use of insulin (Primary)  -     Ambulatory Referral to " Endocrinology    2. Attention deficit hyperactivity disorder (ADHD), unspecified ADHD type  -     Ambulatory Referral to Psychiatry    3. Type 2 diabetes mellitus with complication  Assessment & Plan:  Reviewed labs with pt. Risks of blood sugar elevation include death, heart attack, stroke, kidney disease, blindness. Discussed importance of medication compliance and not missing doses. Will start Levemir 15 units. Given handout to titrate 3 units every 3 days if bg remains >150.  CCM RN will help with medications as well. We will hold synjrady and Ozempic at this time due to pt concerns. Once bg better controlled on insulin will restart. We will monitor at follow up and adjust medications if still elevated. To er if chest pain, palpitations, vision loss, unilateral weakness, altered mental status. Pt understands and agrees with plan.            Follow Up   Return in about 1 month (around 10/25/2023).  Patient was given instructions and counseling regarding her condition or for health maintenance advice. Please see specific information pulled into the AVS if appropriate.

## 2023-09-25 NOTE — ASSESSMENT & PLAN NOTE
Reviewed labs with pt. Risks of blood sugar elevation include death, heart attack, stroke, kidney disease, blindness. Discussed importance of medication compliance and not missing doses. Will start Levemir 15 units. Given handout to titrate 3 units every 3 days if bg remains >150.  CCM RN will help with medications as well. We will hold synjrady and Ozempic at this time due to pt concerns. Once bg better controlled on insulin will restart. We will monitor at follow up and adjust medications if still elevated. To er if chest pain, palpitations, vision loss, unilateral weakness, altered mental status. Pt understands and agrees with plan.

## 2023-09-25 NOTE — PATIENT INSTRUCTIONS
Preventing Diabetes Mellitus Complications  You can help to prevent or slow down problems that are caused by diabetes (diabetes mellitus). Following your diabetes plan and taking care of yourself can reduce your risk of serious or life-threatening complications.  What actions can I take to prevent diabetes complications?  Diabetes management    Follow instructions from your health care providers about managing your diabetes. Your diabetes may be managed by a team of health care providers who can teach you how to care for yourself and can answer questions that you have.  Educate yourself about your condition so you can make healthy choices about eating and physical activity.  Know your target range for your blood sugar (glucose), and check your blood glucose level as often as told. Your health care provider will help you decide how often to check your blood glucose level depending on your treatment goals and how well you are meeting them.  Ask your health care provider if you should take low-dose aspirin daily and what dose is recommended for you. Taking low-dose aspirin daily is recommended to help prevent cardiovascular disease.  Controlling your blood pressure and cholesterol  Your personal target blood pressure is determined based on:  Your age.  Your medicines.  How long you have had diabetes.  Any other medical conditions you have.  To control your blood pressure:  Follow instructions from your health care provider about meal planning, exercise, and medicines.  Make sure your health care provider checks your blood pressure at every medical visit.  Monitor your blood pressure at home as told by your health care provider.  To control your cholesterol:  Follow instructions from your health care provider about meal planning, exercise, and medicines.  Have your cholesterol checked at least once a year.  You may be prescribed medicine to lower cholesterol (statin). If you are not taking a statin, ask your health care  provider if you should be.  Controlling your cholesterol may:  Help prevent heart disease and stroke. These are the most common health problems for people with diabetes.  Improve your blood flow.    Medical appointments and vaccines  Schedule and keep yearly physical exams and eye exams. Your health care provider will tell you how often you need medical visits depending on your diabetes management plan. Keep all follow-up visits as told. This is important so possible problems can be identified early and complications can be avoided or treated.  Every visit with your health care provider should include measuring your:  Weight.  Blood pressure.  Blood glucose control.  Your A1C (hemoglobin A1C) level should be checked:  At least 2 times a year, if you are meeting your treatment goals.  4 times a year, if you are not meeting treatment goals or if your treatment goals have changed.  Your blood lipids (lipid profile) should be checked yearly. You should also be checked yearly for protein in your urine (urine microalbumin).  If you have type 1 diabetes, get an eye exam 3-5 years after you are diagnosed, and then once a year after your first exam.  If you have type 2 diabetes, get an eye exam as soon as you are diagnosed, and then once a year after your first exam.  It is also important to keep your vaccines current. It is recommended that you receive:  A flu (influenza) vaccine every year.  A pneumonia (pneumococcal) vaccine and a hepatitis B vaccine. If you are age 65 or older, you may get the pneumonia vaccine as a series of two separate shots.  Ask your health care provider which other vaccines may be recommended.  Lifestyle  Do not use any products that contain nicotine or tobacco, such as cigarettes, e-cigarettes, and chewing tobacco. If you need help quitting, ask your health care provider. By avoiding nicotine and tobacco:  You will lower your risk for heart attack, stroke, nerve disease, and kidney disease.  Your  cholesterol and blood pressure may improve.  Your blood circulation will improve.  If you drink alcohol:  Limit how much you use to:  0-1 drink a day for women who are not pregnant.  0-2 drinks a day for men.  Be aware of how much alcohol is in your drink. In the [place] one drink equals one 12 oz bottle of beer (355 mL), one 5 oz glass of wine (148 mL), or one 11?2 oz glass of hard liquor (44 mL).  Taking care of your feet  Diabetes may cause you to have poor blood circulation to your legs and feet. Because of this, taking care of your feet is very important. Diabetes can cause:  The skin on the feet to get thinner, break more easily, and heal more slowly.  Nerve damage in your legs and feet, which results in decreased feeling. You may not notice minor injuries that could lead to serious problems.  To avoid foot problems:  Check your skin and feet every day for cuts, bruises, redness, blisters, or sores.  Schedule a foot exam with your health care provider once every year. This exam includes:  Inspecting the structure and skin of your feet.  Checking the pulses and sensation in your feet.  Make sure that your health care provider performs a visual foot exam at every medical visit.    Taking care of your teeth  People with poorly controlled diabetes are more likely to have gum (periodontal) disease. Diabetes can make periodontal diseases harder to control. If not treated, periodontal diseases can lead to tooth loss. To prevent this:  Brush your teeth twice a day.  Floss at least once a day.  Visit your dentist 2 times a year.  Managing stress  Living with diabetes can be stressful. When you are experiencing stress, your blood glucose may be affected in two ways:  Stress hormones may cause your blood glucose to rise.  You may be distracted from taking good care of yourself.  Be aware of your stress level and make changes to help you manage challenging situations. To lower your stress levels:  Consider joining a support  group.  Do planned relaxation or meditation.  Do a hobby that you enjoy.  Maintain healthy relationships.  Exercise regularly.  Work with your health care provider or a mental health professional.  Where to find more information  American Diabetes Association: www.diabetes.org  Association of Diabetes Care and Education Specialists: www.diabeteseducator.org  Summary  You can take action to prevent or slow down problems that are caused by diabetes (diabetes mellitus). Following your diabetes plan and taking care of yourself can reduce your risk of serious or life-threatening complications.  Follow instructions from your health care providers about managing your diabetes. Your diabetes may be managed by a team of health care providers who can teach you how to care for yourself and can answer questions that you have.  Know your target range for your blood sugar (glucose), and check your blood glucose levels as often as told. Your health care provider will help you decide how often you should check your blood glucose level depending on your treatment goals and how well you are meeting them.  Your health care provider will tell you how often you need medical visits depending on your diabetes management plan. Keep all follow-up visits as directed. This is important so possible problems can be identified early and complications can be avoided or treated.  This information is not intended to replace advice given to you by your health care provider. Make sure you discuss any questions you have with your health care provider.  Document Revised: 02/05/2021 Document Reviewed: 02/05/2021  Elsevier Patient Education © 2023 [employer]

## 2023-09-29 ENCOUNTER — PATIENT OUTREACH (OUTPATIENT)
Dept: CASE MANAGEMENT | Facility: OTHER | Age: 43
End: 2023-09-29
Payer: MEDICAID

## 2023-09-29 DIAGNOSIS — E78.2 MIXED HYPERLIPIDEMIA: ICD-10-CM

## 2023-09-29 DIAGNOSIS — E11.8 TYPE 2 DIABETES MELLITUS WITH COMPLICATION: Primary | ICD-10-CM

## 2023-09-29 DIAGNOSIS — E03.9 HYPOTHYROIDISM, UNSPECIFIED TYPE: ICD-10-CM

## 2023-09-29 NOTE — OUTREACH NOTE
[facility] End of Month Documentation    This Chronic Medical Management Care Plan for Sharmaine Torres, 43 y.o. female, has been a new plan of care implemented; established and a new plan of care implemented for the month of September.  A cumulative time of 127 minutes was spent on this patient record this month, including chart review; electronic communication with primary care provider; phone call with patient.    Regarding the patient's problems: has Hypothyroidism; Type 2 diabetes mellitus with complication; Mixed hyperlipidemia; and Generalized anxiety disorder on their problem list., the following items were addressed: medications; medical records; changes to medical care and any changes can be found within the plan section of the note.  A detailed listing of time spent for chronic care management is tracked within each outreach encounter.  Current medications include:  has a current medication list which includes the following prescription(s): atorvastatin, blood glucose monitoring suppl, buspirone, glucose blood, b-d pen, insulin detemir, freestyle, lisinopril, and ozempic (0.25 or 0.5 mg/dose). and the patient is reported to be patient is noncompliant with medication protocol, Patient has not been taking several of her medications,  Medications are reported to be non-effective in controlling symptoms and changes have been made to the medication protocol, 9/18/2023 A1C- 10.5%.  Regarding these diagnoses, referrals were made to the following provider(s): Psychiatry & Endocrinology.  All notes on chart for PCP to review.    The patient was monitored remotely for medications; weight; activity level; blood glucose; mood & behavior.    The patient's physical needs include:  help taking medications as prescribed; eye care; health care coverage; medication education; physical healthcare.     The patient's mental support needs include:  not applicable    The patient's cognitive support needs include:  medication; health  care    The patient's psychosocial support needs include:  medication management or adherence    The patient's functional needs include: eye care; medication education; physical healthcare; health care coverage    The patient's environmental needs include:  not applicable    Care Plan overall comments:  Established    Refer to previous outreach notes for more information on the areas listed above.    Monthly Billing Diagnoses  (E11.8) Type 2 diabetes mellitus with complication    (E03.9) Hypothyroidism, unspecified type    (E78.2) Mixed hyperlipidemia    Medications   Medications have been reconciled    Care Plan progress this month:      Recently Modified Care Plans Updates made since 8/29/2023 12:00 AM       Wellness (Adult)           Problem Priority Last Modified     Medication Adherence (Wellness) --  9/21/2023 11:44 AM by Piedad Harper RN              Goal Recent Progress Last Modified     Medication Adherence Maintained --  9/21/2023 11:44 AM by Piedad Harper, RN     Evidence-based guidance:   Develop a complete and accurate medication list including those prescribed and over-the-counter, those taken only occasionally and those not taken by mouth such as injections, inhalers, ointments or creams and drops.   Review all medications to determine if patient or caregiver knows why the medications are given and if taken as prescribed.   Complete or review a medication adherence assessment including barriers to medication adherence.   Arrange and encourage counseling and medication review by pharmacist.   Assess barriers to medication adherence.   Manage poor understanding or health literacy by using easy to understand language, teach-back, visual aids and teaching only 2 or 3 points at a time.   Assess presence of side effects; provide suggestions to manage or reduce side effects.   Consult with provider and/or pharmacist regarding substitute medication, changing dose, simplification of regimen or  safe discontinuation of some medications.   Encourage the use of medication reminders such as clock or cell phone alarm, color coding, pillboxes for am/pm and days of the week, pharmacy refill reminder, auto-refill system or mail-order option.   Assist with resources when cost is a barrier; refer to prescription assistance programs; confirm that generics are prescribed whenever possible; consider 90-day prescriptions to reduce copay cost; synchronize refills.   Provide help to complete medication assistance applications or health insurance forms as needed.   Complete a follow-up call 2 to 3 weeks after medication self-management plan developed; assess adherence and understanding, as well as listen to patient or caregiver concerns; amend plan as needed.   Provide frequent follow-up providing motivation, encouragement and support when medication nonadherence is identified.    Notes:            Task Due Date Last Modified     Optimize Medication Use --  9/21/2023 11:44 AM by Piedad Harper, FRAN     Care Management Activities:      - not discussed during this outreach      Notes:                   Diabetes Type 2 (Adult)           Problem Priority Last Modified     Glycemic Management (Diabetes, Type 2) --  9/21/2023 11:44 AM by Piedad Harper, FRAN              Goal Recent Progress Last Modified     Glycemic Management Optimized --  9/21/2023 11:44 AM by Piedad Harper, RN     Evidence-based guidance:   Anticipate A1C testing (point-of-care) every 3 to 6 months based on goal attainment.   Review mutually-set A1C goal or target range.   Anticipate use of antihyperglycemic with or without insulin and periodic adjustments; consider active involvement of pharmacist.   Provide medical nutrition therapy and development of individualized eating.   Compare self-reported symptoms of hypo or hyperglycemia to blood glucose levels, diet and fluid intake, current medications, psychosocial and physiologic stressors,  change in activity and barriers to care adherence.   Promote self-monitoring of blood glucose levels.   Assess and address barriers to management plan, such as food insecurity, age, developmental ability, depression, anxiety, fear of hypoglycemia or weight gain, as well as medication cost, side effects and complicated regimen.   Consider referral to community-based diabetes education program, visiting nurse, community health worker or health .   Encourage regular dental care for treatment of periodontal disease; refer to dental provider when needed.    Notes:            Task Due Date Last Modified     Alleviate Barriers to Glycemic Management --  9/21/2023 11:44 AM by Piedad Harper RN     Care Management Activities:      - not discussed during this outreach      Notes:              Problem Priority Last Modified     Disease Progression (Diabetes, Type 2) --  9/21/2023 11:44 AM by Piedad Harper RN              Goal Recent Progress Last Modified     Disease Progression Prevented or Minimized --  9/21/2023 11:44 AM by Piedad Harper RN     Evidence-based guidance:   Prepare patient for laboratory and diagnostic exams based on risk and presentation.   Encourage lifestyle changes, such as increased intake of plant-based foods, stress reduction, consistent physical activity and smoking cessation to prevent long-term complications and chronic disease.    Individualize activity and exercise recommendations while considering potential limitations, such as neuropathy, retinopathy or the ability to prevent hyperglycemia or hypoglycemia.    Prepare patient for use of pharmacologic therapy that may include antihypertensive, analgesic, prostaglandin E1 with periodic adjustments, based on presenting chronic condition and laboratory results.   Assess signs/symptoms and risk factors for hypertension, sleep-disordered breathing, neuropathy (including changes in gait and balance), retinopathy, nephropathy  and sexual dysfunction.   Address pregnancy planning and contraceptive choice, especially when prescribing antihypertensive or statin.   Ensure completion of annual comprehensive foot exam and dilated eye exam.    Implement additional individualized goals and interventions based on identified risk factors.   Prepare patient for consultation or referral for specialist care, such as ophthalmology, neurology, cardiology, podiatry, nephrology or perinatology.    Notes:            Task Due Date Last Modified     Monitor and Manage Follow-up for Comorbidities --  9/21/2023 11:44 AM by Piedad Harper RN     Care Management Activities:      - not discussed during this outreach      Notes:                        Current Specialty Plan of Care Status signed by both patient and provider    Instructions   Patient was provided an electronic copy of care plan  CCM services were explained and offered and patient has accepted these services.  Patient has given their written consent to receive CCM services and understands that this includes the authorization of electronic communication of medical information with the other treating providers.  Patient understands that they may stop CCM services at any time and these changes will be effective at the end of the calendar month and will effectively revocate the agreement of CCM services.  Patient understands that only one practitioner can furnish and be paid for CCM services during one calendar month.  Patient also understands that there may be co-payment or deductible fees in association with CCM services.  Patient will continue with at least monthly follow-up calls with the Ambulatory .    Juana RODRIGUEZ  Ambulatory Case Management    9/29/2023, 10:50 EDT

## 2023-10-11 ENCOUNTER — PATIENT OUTREACH (OUTPATIENT)
Dept: CASE MANAGEMENT | Facility: OTHER | Age: 43
End: 2023-10-11
Payer: MEDICAID

## 2023-10-11 DIAGNOSIS — E11.8 TYPE 2 DIABETES MELLITUS WITH COMPLICATION: Primary | ICD-10-CM

## 2023-10-11 NOTE — OUTREACH NOTE
AMBULATORY CASE MANAGEMENT NOTE    Name and Relationship of Patient/Support Person: Sharmaine Torres J - Self    CCM Interim Update    Patient called ACM with concerns regarding stomach pain, diarrhea, nausea, high blood sugar and overall not feeling well for about 8 days.     Patient was taken off Ozempic and started on Levemir 15 Units Daily due to GI upset and nausea on 9/25/23.     Patient has been administering her Levemir before bedtime as prescribed by PCP, typically around 10 pm.     Patient reports her blood sugars have been ranging between 220 - 300 and she checks in the morning and before bed.     Acute Visit scheduled for tomorrow for above symptoms.     Encouraged patient to stay hydrated with water and stick to bland diet as tolerated due to the GI upset. Patient verbalized understanding.     Asked patient to bring in Blood Glucose log tomorrow for appointment as well since she has also wrote down her symptoms with each blood sugar reading.           Juana RODRIGUEZ  Ambulatory Case Management    10/11/2023, 15:36 EDT

## 2023-10-12 ENCOUNTER — OFFICE VISIT (OUTPATIENT)
Dept: INTERNAL MEDICINE | Facility: CLINIC | Age: 43
End: 2023-10-12
Payer: MEDICAID

## 2023-10-12 VITALS
WEIGHT: 201 LBS | BODY MASS INDEX: 29.77 KG/M2 | RESPIRATION RATE: 16 BRPM | SYSTOLIC BLOOD PRESSURE: 120 MMHG | TEMPERATURE: 96.5 F | DIASTOLIC BLOOD PRESSURE: 82 MMHG | HEIGHT: 69 IN | OXYGEN SATURATION: 98 % | HEART RATE: 94 BPM

## 2023-10-12 DIAGNOSIS — E11.65 TYPE 2 DIABETES MELLITUS WITH HYPERGLYCEMIA, WITHOUT LONG-TERM CURRENT USE OF INSULIN: Primary | ICD-10-CM

## 2023-10-12 DIAGNOSIS — R10.84 GENERALIZED ABDOMINAL PAIN: ICD-10-CM

## 2023-10-12 DIAGNOSIS — J02.9 SORE THROAT: ICD-10-CM

## 2023-10-12 DIAGNOSIS — R19.7 DIARRHEA, UNSPECIFIED TYPE: ICD-10-CM

## 2023-10-12 DIAGNOSIS — G44.209 TENSION-TYPE HEADACHE, NOT INTRACTABLE, UNSPECIFIED CHRONICITY PATTERN: ICD-10-CM

## 2023-10-12 LAB
BILIRUB UR QL STRIP: NEGATIVE
CLARITY UR: CLEAR
COLOR UR: YELLOW
EXPIRATION DATE: NORMAL
GLUCOSE BLDC GLUCOMTR-MCNC: 355 MG/DL (ref 70–130)
GLUCOSE UR STRIP-MCNC: ABNORMAL MG/DL
HGB UR QL STRIP.AUTO: NEGATIVE
INTERNAL CONTROL: NORMAL
KETONES UR QL STRIP: NEGATIVE
LEUKOCYTE ESTERASE UR QL STRIP.AUTO: NEGATIVE
Lab: 7363
NITRITE UR QL STRIP: NEGATIVE
PH UR STRIP.AUTO: <=5 [PH] (ref 5–8)
PROT UR QL STRIP: ABNORMAL
S PYO AG THROAT QL: NEGATIVE
SP GR UR STRIP: >=1.03 (ref 1–1.03)
UROBILINOGEN UR QL STRIP: ABNORMAL

## 2023-10-12 PROCEDURE — 81003 URINALYSIS AUTO W/O SCOPE: CPT

## 2023-10-12 RX ORDER — CETIRIZINE HYDROCHLORIDE 10 MG/1
10 TABLET ORAL DAILY
Qty: 30 TABLET | Refills: 3 | Status: SHIPPED | OUTPATIENT
Start: 2023-10-12

## 2023-10-12 RX ORDER — ONDANSETRON 4 MG/1
4 TABLET, ORALLY DISINTEGRATING ORAL EVERY 8 HOURS PRN
Qty: 6 TABLET | Refills: 0 | Status: SHIPPED | OUTPATIENT
Start: 2023-10-12

## 2023-10-12 RX ORDER — PEN NEEDLE, DIABETIC 32GX 5/32"
NEEDLE, DISPOSABLE MISCELLANEOUS SEE ADMIN INSTRUCTIONS
COMMUNITY
Start: 2023-09-24

## 2023-10-12 NOTE — PROGRESS NOTES
"Chief Complaint  Abdominal Pain and Headache    Subjective      History of Present Illness    Patient notes she feels \"sick\" x 9 days, patient notes symptoms come and go. Patient reports  nauseas, diarrhea, sharp abdominal pain, dizzy, earache, sore throat.  Denies cough, sore throat, chest pain, urinary frequency, dysuria.  Patient notes she started levemir about two weeks ago. Tolerated well for the first week, then developed these symptoms. Pt is concerned insulin could be causing symptoms. Morning and afternoon sugar levels ranging mostly around 300-411. 15 units at night time.   Patient drinks about 4 sodas a day. Here recent diet has consisted mostly of cabbage and pork chops. Patient tries to limit potatoes and bread.       Sharmaine Torres is a 43 y.o. female who [facility] to [facility] with a past medical history of  Past Medical History:   Diagnosis Date    Anxiety     Depression     Diabetes mellitus     Endometriosis     Hyperlipidemia     Hypothyroidism     Obesity          Objective   Vital Signs:   Vitals:    10/12/23 1323   BP: 120/82   BP Location: Right arm   Patient Position: Sitting   Cuff Size: Adult   Pulse: 94   Resp: 16   Temp: 96.5 °F (35.8 °C)   TempSrc: Temporal   SpO2: 98%   Weight: 91.2 kg (201 lb)   Height: 175.3 cm (69.02\")       Wt Readings from Last 3 Encounters:   10/12/23 91.2 kg (201 lb)   09/25/23 93 kg (205 lb)   09/18/23 93 kg (205 lb)     BP Readings from Last 3 Encounters:   10/12/23 120/82   09/25/23 128/86   09/18/23 130/88         Physical Exam  Constitutional:       Appearance: Normal appearance.   HENT:      Head: Normocephalic and atraumatic.      Right Ear: Tympanic membrane, ear canal and external ear normal.      Left Ear: Tympanic membrane, ear canal and external ear normal.      Ears:      Comments: Clear effusion of TM, R worse than left      Mouth/Throat:      Mouth: Mucous membranes are moist.      Pharynx: " Oropharynx is clear.   Eyes:      Conjunctiva/sclera: Conjunctivae normal.   Cardiovascular:      Rate and Rhythm: Normal rate and regular rhythm.      Pulses: Normal pulses.      Heart sounds: Normal heart sounds.   Pulmonary:      Effort: Pulmonary effort is normal.      Breath sounds: Normal breath sounds.   Abdominal:      General: Abdomen is flat. Bowel sounds are normal. There is no distension.      Palpations: Abdomen is soft. There is no mass.      Tenderness: There is abdominal tenderness (generalized abdominal tenderness). There is no guarding or rebound.      Hernia: No hernia is present.   Skin:     General: Skin is warm and dry.   Neurological:      Mental Status: She is alert and oriented to person, place, and time.   Psychiatric:         Mood and Affect: Mood normal.         Behavior: Behavior normal.         Judgment: Judgment normal.            Result Review :  The following data was reviewed by: Shavonne Orr PA-C on 10/12/2023:      Procedures        Assessment and Plan   Diagnoses and all orders for this visit:    1. Type 2 diabetes mellitus with hyperglycemia, without long-term current use of insulin (Primary)  Comments:  Point-of-care glucose 355  Assessment & Plan:  -At home glucose levels elevated, 300-430, would like for patient to increase Levemir to 20 units nightly.  Juana will be getting in contact with patient on Monday to see what glucose levels have been ranging with increased insulin.  Results will be notified to PCP, and based on results changes on insulin will be made.  Informed patient that if she does not hear back from Juana, contact office with glucose morning levels.  -Urinalysis negative for ketones.  -Patient has follow-up appointment with PCP on 10/30  -Discussed with patient signs and symptoms that would require immediate ER follow-up.  -We will like for patient to continue Levemir.  Discussed with patient symptoms could have possibly been due to secondary viral illness,  in addition to hyperglycemia.  Would expect for symptoms to improve with better glucose control.    Orders:  -     POC Glucose  -     Urinalysis With Culture If Indicated - Urine, Clean Catch  -     Cancel: Urinalysis, Microscopic Only - Urine, Clean Catch    2. Generalized abdominal pain  Assessment & Plan:  -Negative in office for strep and urinalysis unremarkable.  -Discussed with patient symptoms likely secondary to hyperglycemia.  -Discussed with patientBRAT diet  -Discussed with patient ways to improve diet to have  better glucose control.  -Patient has abdominal CT scan scheduled for October 18.      Orders:  -     POC Glucose  -     Urinalysis With Culture If Indicated - Urine, Clean Catch  -     Cancel: Urinalysis, Microscopic Only - Urine, Clean Catch    3. Sore throat  Comments:  Improving.  Suggested Tylenol/ibuprofen.  Possibly secondary to postnasal drip, Zyrtec sent to pharmacy.  Orders:  -     POCT rapid strep A    4. Tension-type headache, not intractable, unspecified chronicity pattern  -     POC Glucose    5. Diarrhea, unspecified type  Assessment & Plan:  Likely viral in etiology. Would like for patient to modify diet. May consider probiotics if symptoms do not improve. If symptoms persist past 2 weeks, could consider stool cultures.       Other orders  -     cetirizine (zyrTEC) 10 MG tablet; Take 1 tablet by mouth Daily.  Dispense: 30 tablet; Refill: 3  -     ondansetron ODT (ZOFRAN-ODT) 4 MG disintegrating tablet; Place 1 tablet on the tongue Every 8 (Eight) Hours As Needed for Nausea or Vomiting.  Dispense: 6 tablet; Refill: 0                  FOLLOW UP  Return in about 2 weeks (around 10/26/2023) for Recheck.  Patient was given instructions and counseling regarding her condition or for health maintenance advice. Please see specific information pulled into the AVS if appropriate.       Shavonne Orr PA-C  10/16/23  16:05 EDT    CURRENT & DISCONTINUED MEDICATIONS  Current Outpatient Medications    Medication Instructions    atorvastatin (LIPITOR) 40 mg, Oral, Daily    BD Pen Needle Nunu 2nd Gen 32G X 4 MM misc See Admin Instructions    Blood Glucose Monitoring Suppl w/Device kit Dispense 1 kit with monitor to check blood sugar once daily. Diagnosis: DM E11.65    busPIRone (BUSPAR) 10 mg, Oral, Nightly    cetirizine (ZYRTEC) 10 mg, Oral, Daily    glucose blood test strip Check blood sugar once daily in the morning    Injection Device for Insulin (B-D PEN) device For insulin and ozempic    insulin detemir (LEVEMIR) 10 Units, Subcutaneous, Daily    Lancets (freestyle) lancets Check blood sugar once daily in the morning    lisinopril (ZESTRIL) 2.5 mg, Oral, Daily    ondansetron ODT (ZOFRAN-ODT) 4 mg, Translingual, Every 8 Hours PRN       Medications Discontinued During This Encounter   Medication Reason    Semaglutide,0.25 or 0.5MG/DOS, (Ozempic, 0.25 or 0.5 MG/DOSE,) 2 MG/1.5ML solution pen-injector *Therapy completed

## 2023-10-16 PROBLEM — R19.7 DIARRHEA: Status: ACTIVE | Noted: 2023-10-16

## 2023-10-16 PROBLEM — R10.84 GENERALIZED ABDOMINAL PAIN: Status: ACTIVE | Noted: 2023-10-16

## 2023-10-16 PROBLEM — J02.9 SORE THROAT: Status: ACTIVE | Noted: 2023-10-16

## 2023-10-16 PROBLEM — E11.65 TYPE 2 DIABETES MELLITUS WITH HYPERGLYCEMIA, WITHOUT LONG-TERM CURRENT USE OF INSULIN: Status: ACTIVE | Noted: 2023-10-16

## 2023-10-16 NOTE — ASSESSMENT & PLAN NOTE
Likely viral in etiology. Would like for patient to modify diet. May consider probiotics if symptoms do not improve. If symptoms persist past 2 weeks, could consider stool cultures.

## 2023-10-16 NOTE — ASSESSMENT & PLAN NOTE
-At home glucose levels elevated, 300-430, would like for patient to increase Levemir to 20 units nightly.  Juana will be getting in contact with patient on Monday to see what glucose levels have been ranging with increased insulin.  Results will be notified to PCP, and based on results changes on insulin will be made.  Informed patient that if she does not hear back from Juana, contact office with glucose morning levels.  -Urinalysis negative for ketones.  -Patient has follow-up appointment with PCP on 10/30  -Discussed with patient signs and symptoms that would require immediate ER follow-up.  -We will like for patient to continue Levemir.  Discussed with patient symptoms could have possibly been due to secondary viral illness, in addition to hyperglycemia.  Would expect for symptoms to improve with better glucose control.

## 2023-10-16 NOTE — ASSESSMENT & PLAN NOTE
-Negative in office for strep and urinalysis unremarkable.  -Discussed with patient symptoms likely secondary to hyperglycemia.  -Discussed with patientBRAT diet  -Discussed with patient ways to improve diet to have  better glucose control.  -Patient has abdominal CT scan scheduled for October 18.

## 2023-10-17 ENCOUNTER — HOSPITAL ENCOUNTER (OUTPATIENT)
Dept: CT IMAGING | Facility: HOSPITAL | Age: 43
Discharge: HOME | End: 2023-10-17
Admitting: PHYSICIAN ASSISTANT
Payer: MEDICAID

## 2023-10-17 DIAGNOSIS — R10.9 ABDOMINAL PAIN, UNSPECIFIED ABDOMINAL LOCATION: ICD-10-CM

## 2023-10-17 DIAGNOSIS — M62.9 LESION OF SKELETAL MUSCLE: ICD-10-CM

## 2023-10-17 DIAGNOSIS — N80.9 ENDOMETRIOSIS: ICD-10-CM

## 2023-10-17 PROCEDURE — 25510000001 IOPAMIDOL PER 1 ML: Performed by: PHYSICIAN ASSISTANT

## 2023-10-17 PROCEDURE — 74178 CT ABD&PLV WO CNTR FLWD CNTR: CPT

## 2023-10-17 RX ADMIN — IOPAMIDOL 100 ML: 755 INJECTION, SOLUTION INTRAVENOUS at 15:23

## 2023-10-18 DIAGNOSIS — N80.9 ENDOMETRIOSIS: Primary | ICD-10-CM

## 2023-10-20 ENCOUNTER — PATIENT OUTREACH (OUTPATIENT)
Dept: CASE MANAGEMENT | Facility: OTHER | Age: 43
End: 2023-10-20
Payer: MEDICAID

## 2023-10-20 DIAGNOSIS — E11.8 TYPE 2 DIABETES MELLITUS WITH COMPLICATION: Primary | ICD-10-CM

## 2023-10-20 NOTE — OUTREACH NOTE
AMBULATORY CASE MANAGEMENT NOTE    Name and Relationship of Patient/Support Person: Sharmaine Torres - Self    CCM Interim Update    PCP asked if ACM could follow up with patient more closely since increasing Levemir dose during 10/12/23 appointment.     Patient has been experiencing elevated blood glucose levels: 300 - 400+ daily - Levemir increased to 20 units nightly     Patient reports she has been taking the 20 units nightly, however has not noticed a difference with her blood glucose levels. Patient was out running errands today and does not have her log with her, asked if ACM could call her on Monday 10/23/23 to review a weeks worth of blood sugars.     Patient reports she has been taking her blood sugar before bedtime, she was unsure when she was supposed to be checking. Educated patient to check blood sugar in the morning before eating breakfast and she can also continue to check before bedtime as well - patient verbalized understanding.         Adult Patient Profile  Questions/Answers      Flowsheet Row Most Recent Value   Symptoms/Conditions Managed at Home diabetes, type 2   Diabetes Management Strategies blood glucose testing, insulin therapy, routine screenings   Frequency of Blood Glucose Testing --  [Daily Fasting BG]   A1C Target < 7%   Usual Blood Glucose 200 - 300s   Last A1C Result 10.5%   Injection Schedule Levemir at Bedtime   Diabetes Self-Management Outcome 2 (bad)                Education Documentation  Insulin Therapy, taught by Juana Robison, RN at 10/20/2023 12:12 PM.  Learner: Patient  Readiness: Acceptance  Method: Explanation  Response: Verbalizes Understanding, Needs Reinforcement    Blood Glucose Monitoring, taught by Juana Robison, RN at 10/20/2023 12:12 PM.  Learner: Patient  Readiness: Acceptance  Method: Explanation  Response: Verbalizes Understanding, Needs Reinforcement          Juana RODRIGUEZ  Ambulatory Case Management    10/20/2023, 12:12 EDT

## 2023-10-23 ENCOUNTER — PATIENT OUTREACH (OUTPATIENT)
Dept: CASE MANAGEMENT | Facility: OTHER | Age: 43
End: 2023-10-23
Payer: MEDICAID

## 2023-10-23 DIAGNOSIS — E11.8 TYPE 2 DIABETES MELLITUS WITH COMPLICATION: Primary | ICD-10-CM

## 2023-10-23 DIAGNOSIS — E11.65 TYPE 2 DIABETES MELLITUS WITH HYPERGLYCEMIA, WITHOUT LONG-TERM CURRENT USE OF INSULIN: Primary | ICD-10-CM

## 2023-10-23 NOTE — OUTREACH NOTE
AMBULATORY CASE MANAGEMENT NOTE    Name and Relationship of Patient/Support Person: Sharmaine Torres SACHA - Self    CCM Interim Update    Called patient to review blood glucose log - Patient has been writing down BG levels on several sheets of paper that she misplaces sometimes and is not checking her BG around the same time everyday.     Patient reports wakes up around [id number] time frame, unless she is watching her grandson in which she wakes up around 0500.     Patient did not understand that she needed to check her BG around the same time every morning - reeducated patient on the importance of sticking to a schedule and encouraged patient to set BG monitor on nightstand, therefore when she wakes up in the morning she can check her BG level before she starts her day. Patient verbalized understanding.     Patient also reports she has been administering her insulin in her thighs, reeducated patient to start administering insulin in her stomach, rotating the site around her belly button either in a clock wise motion or counter clockwise. Patient verbalized understanding.     BG Log:    10/13 338   10/14    10/15 369   10/16 328   10/17 308   10/18 323   10/19 358   10/20    10/21 357   10/22 348   10/23 315     Patient is currently administering 20 Units of Levemir Nightly.       Care Coordination    Spoke with PCP in person about above BG log. Plan to increase Levemir to 30 Units Nightly and review log in 1 week. Highly encourage patient to check fasting BG daily.     ACM called patient to reviewed increase in Levemir dose. Patient verbalized understanding.           Juana RODRIGUEZ  Ambulatory Case Management    10/23/2023, 12:47 EDT

## 2023-10-31 ENCOUNTER — PATIENT OUTREACH (OUTPATIENT)
Dept: CASE MANAGEMENT | Facility: OTHER | Age: 43
End: 2023-10-31
Payer: MEDICAID

## 2023-10-31 DIAGNOSIS — E78.2 MIXED HYPERLIPIDEMIA: ICD-10-CM

## 2023-10-31 DIAGNOSIS — E03.9 HYPOTHYROIDISM, UNSPECIFIED TYPE: ICD-10-CM

## 2023-10-31 DIAGNOSIS — E11.65 TYPE 2 DIABETES MELLITUS WITH HYPERGLYCEMIA, WITHOUT LONG-TERM CURRENT USE OF INSULIN: Primary | ICD-10-CM

## 2023-10-31 NOTE — OUTREACH NOTE
[facility] End of Month Documentation    This Chronic Medical Management Care Plan for Sharmaine Torres, 43 y.o. female, has been No data recorded and a new plan of care implemented for the month of No data recorded.  A cumulative time of 43 minutes was spent on this patient record this month, including No data recorded.    Regarding the patient's problems: has Hypothyroidism; Type 2 diabetes mellitus with complication; Mixed hyperlipidemia; Generalized anxiety disorder; Type 2 diabetes mellitus with hyperglycemia, without long-term current use of insulin; Sore throat; Generalized abdominal pain; and Diarrhea on their problem list., the following items were addressed: No data recorded and any changes can be found within the plan section of the note.  A detailed listing of time spent for chronic care management is tracked within each outreach encounter.  Current medications include:  has a current medication list which includes the following prescription(s): atorvastatin, bd pen needle feliberto 2nd gen, blood glucose monitoring suppl, buspirone, cetirizine, glucose blood, b-d pen, insulin detemir, freestyle, lisinopril, and ondansetron odt. and the patient is reported to be No data recorded,  Medications are reported to be No data recorded.  Regarding these diagnoses, referrals were made to the following provider(s): OB/GYN.  All notes on chart for PCP to review.    The patient was monitored remotely for No data recorded.    The patient's physical needs include:  No data recorded.     The patient's mental support needs include:  No data recorded    The patient's cognitive support needs include:  No data recorded    The patient's psychosocial support needs include:  No data recorded    The patient's functional needs include: No data recorded    The patient's environmental needs include:  No data recorded    Care Plan overall comments:  No data recorded    Refer to previous outreach notes for more information on the areas listed  above.    Monthly Billing Diagnoses  (E11.65) Type 2 diabetes mellitus with hyperglycemia, without long-term current use of insulin    (E78.2) Mixed hyperlipidemia    (E03.9) Hypothyroidism, unspecified type    Medications   Medications have been reconciled    Care Plan progress this month:      Recently Modified Care Plans Updates made since 9/30/2023 12:00 AM       Wellness (Adult)           Problem Priority Last Modified     Medication Adherence (Wellness) --  9/21/2023 11:44 AM by Piedad Harper RN              Goal Recent Progress Last Modified     Medication Adherence Maintained --  9/21/2023 11:44 AM by Piedad Harper, RN     Evidence-based guidance:   Develop a complete and accurate medication list including those prescribed and over-the-counter, those taken only occasionally and those not taken by mouth such as injections, inhalers, ointments or creams and drops.   Review all medications to determine if patient or caregiver knows why the medications are given and if taken as prescribed.   Complete or review a medication adherence assessment including barriers to medication adherence.   Arrange and encourage counseling and medication review by pharmacist.   Assess barriers to medication adherence.   Manage poor understanding or health literacy by using easy to understand language, teach-back, visual aids and teaching only 2 or 3 points at a time.   Assess presence of side effects; provide suggestions to manage or reduce side effects.   Consult with provider and/or pharmacist regarding substitute medication, changing dose, simplification of regimen or safe discontinuation of some medications.   Encourage the use of medication reminders such as clock or cell phone alarm, color coding, pillboxes for am/pm and days of the week, pharmacy refill reminder, auto-refill system or mail-order option.   Assist with resources when cost is a barrier; refer to prescription assistance programs; confirm that  generics are prescribed whenever possible; consider 90-day prescriptions to reduce copay cost; synchronize refills.   Provide help to complete medication assistance applications or health insurance forms as needed.   Complete a follow-up call 2 to 3 weeks after medication self-management plan developed; assess adherence and understanding, as well as listen to patient or caregiver concerns; amend plan as needed.   Provide frequent follow-up providing motivation, encouragement and support when medication nonadherence is identified.    Notes:            Task Due Date Last Modified     Optimize Medication Use --  10/23/2023 12:45 PM by Juana Robison RN     Care Management Activities:      - barriers to medication adherence identified  - medication reminder use encouraged      Notes:                     Diabetes Type 2 (Adult)           Problem Priority Last Modified     Glycemic Management (Diabetes, Type 2) --  9/21/2023 11:44 AM by Piedad Harper RN              Goal Recent Progress Last Modified     Glycemic Management Optimized --  9/21/2023 11:44 AM by Piedad Harper RN     Evidence-based guidance:   Anticipate A1C testing (point-of-care) every 3 to 6 months based on goal attainment.   Review mutually-set A1C goal or target range.   Anticipate use of antihyperglycemic with or without insulin and periodic adjustments; consider active involvement of pharmacist.   Provide medical nutrition therapy and development of individualized eating.   Compare self-reported symptoms of hypo or hyperglycemia to blood glucose levels, diet and fluid intake, current medications, psychosocial and physiologic stressors, change in activity and barriers to care adherence.   Promote self-monitoring of blood glucose levels.   Assess and address barriers to management plan, such as food insecurity, age, developmental ability, depression, anxiety, fear of hypoglycemia or weight gain, as well as medication cost, side effects  and complicated regimen.   Consider referral to community-based diabetes education program, visiting nurse, community health worker or health .   Encourage regular dental care for treatment of periodontal disease; refer to dental provider when needed.    Notes:            Task Due Date Last Modified     Alleviate Barriers to Glycemic Management --  10/23/2023 12:46 PM by Juana Robison RN     Care Management Activities:      - barriers to adherence to treatment plan identified  - blood glucose monitoring encouraged  - blood glucose readings reviewed  - resources required to improve adherence to care identified  - use of blood glucose monitoring log promoted      Notes:     10/23/23: Patient utilizing blood glucose log, however lost log book and writing down on multiple sheets of paper - will mail BG log to patient to start using                Problem Priority Last Modified     Disease Progression (Diabetes, Type 2) --  9/21/2023 11:44 AM by Piedad Harper RN              Goal Recent Progress Last Modified     Disease Progression Prevented or Minimized --  9/21/2023 11:44 AM by Piedad Harper, RN     Evidence-based guidance:   Prepare patient for laboratory and diagnostic exams based on risk and presentation.   Encourage lifestyle changes, such as increased intake of plant-based foods, stress reduction, consistent physical activity and smoking cessation to prevent long-term complications and chronic disease.    Individualize activity and exercise recommendations while considering potential limitations, such as neuropathy, retinopathy or the ability to prevent hyperglycemia or hypoglycemia.    Prepare patient for use of pharmacologic therapy that may include antihypertensive, analgesic, prostaglandin E1 with periodic adjustments, based on presenting chronic condition and laboratory results.   Assess signs/symptoms and risk factors for hypertension, sleep-disordered breathing, neuropathy (including  changes in gait and balance), retinopathy, nephropathy and sexual dysfunction.   Address pregnancy planning and contraceptive choice, especially when prescribing antihypertensive or statin.   Ensure completion of annual comprehensive foot exam and dilated eye exam.    Implement additional individualized goals and interventions based on identified risk factors.   Prepare patient for consultation or referral for specialist care, such as ophthalmology, neurology, cardiology, podiatry, nephrology or perinatology.    Notes:            Task Due Date Last Modified     Monitor and Manage Follow-up for Comorbidities --  10/23/2023 12:46 PM by Juana Robison RN     Care Management Activities:      - activity based on tolerance and functional limitations encouraged  - healthy lifestyle promoted  - quality of sleep assessed      Notes:                          Current Specialty Plan of Care Status signed by both patient and provider    Instructions   Patient was provided an electronic copy of care plan  CCM services were explained and offered and patient has accepted these services.  Patient has given their written consent to receive CCM services and understands that this includes the authorization of electronic communication of medical information with the other treating providers.  Patient understands that they may stop CCM services at any time and these changes will be effective at the end of the calendar month and will effectively revocate the agreement of CCM services.  Patient understands that only one practitioner can furnish and be paid for CCM services during one calendar month.  Patient also understands that there may be co-payment or deductible fees in association with CCM services.  Patient will continue with at least monthly follow-up calls with the Ambulatory .    Juana RODRIGUEZ  Ambulatory Case Management    10/31/2023, 10:53 EDT

## 2023-11-21 ENCOUNTER — PATIENT OUTREACH (OUTPATIENT)
Dept: CASE MANAGEMENT | Facility: OTHER | Age: 43
End: 2023-11-21
Payer: MEDICAID

## 2023-11-21 DIAGNOSIS — E11.65 TYPE 2 DIABETES MELLITUS WITH HYPERGLYCEMIA, WITHOUT LONG-TERM CURRENT USE OF INSULIN: Primary | ICD-10-CM

## 2023-11-21 DIAGNOSIS — E78.2 MIXED HYPERLIPIDEMIA: ICD-10-CM

## 2023-11-21 NOTE — OUTREACH NOTE
AMBULATORY CASE MANAGEMENT NOTE    Name and Relationship of Patient/Support Person: Sharmaine Torres - Self  Self    CCM Interim Update    Outgoing call to the patient. Introduced self and explained role.  Discussed blood sugar log.  Patient reports being out of town at this time and does not have her log but reports that they have been running in the upper 200s and lower 300s since taking new dose of 30 units of Levimir.  She has enough medication but she is unsure if she may need a PA for refills.  Discussed that when she is back in town that she can follow up with her pharmacy.  She states she will do this.  She will also reschedule a PCP appointment.  She states she missed her last appointment.  She plans to log blood sugars am and pm. Discussed logging her dietary intake to review and she will start this after Thanksgiving pinkyiday.           Juana FIERRO  Ambulatory Case Management    11/21/2023, 17:16 EST

## 2023-11-30 ENCOUNTER — PATIENT OUTREACH (OUTPATIENT)
Dept: CASE MANAGEMENT | Facility: OTHER | Age: 43
End: 2023-11-30
Payer: MEDICAID

## 2023-11-30 DIAGNOSIS — R03.0 ELEVATED BLOOD PRESSURE READING: ICD-10-CM

## 2023-11-30 DIAGNOSIS — E78.2 MIXED HYPERLIPIDEMIA: ICD-10-CM

## 2023-11-30 DIAGNOSIS — E11.65 TYPE 2 DIABETES MELLITUS WITH HYPERGLYCEMIA, WITHOUT LONG-TERM CURRENT USE OF INSULIN: Primary | ICD-10-CM

## 2023-12-01 NOTE — OUTREACH NOTE
[facility] End of Month Documentation    This Chronic Medical Management Care Plan for Sharmaine Torres, 43 y.o. female, has been monitored and managed; reviewed and a new plan of care implemented for the month of November.  A cumulative time of 37  minutes was spent on this patient record this month, including phone call with patient; chart review.    Regarding the patient's problems: has Hypothyroidism; Type 2 diabetes mellitus with complication; Mixed hyperlipidemia; Generalized anxiety disorder; Type 2 diabetes mellitus with hyperglycemia, without long-term current use of insulin; Sore throat; Generalized abdominal pain; and Diarrhea on their problem list., the following items were addressed: medications; medical records and any changes can be found within the plan section of the note.  A detailed listing of time spent for chronic care management is tracked within each outreach encounter.  Current medications include:  has a current medication list which includes the following prescription(s): atorvastatin, bd pen needle feliberto 2nd gen, blood glucose monitoring suppl, buspirone, cetirizine, glucose blood, b-d pen, insulin detemir, freestyle, lisinopril, and ondansetron odt. and the patient is reported to be patient is noncompliant with medication protocol, patient is making progress with medication adherence and monitoring blood sugars,  Medications are reported to be non-effective in controlling symptoms and changes have been made to the medication protocol.  Regarding these diagnoses, referrals were made to the following provider(s):  n/a.  All notes on chart for PCP to review.    The patient was monitored remotely for blood glucose; mood & behavior.    The patient's physical needs include:  medication education; physical healthcare.     The patient's mental support needs include:  not applicable    The patient's cognitive support needs include:  health care; medication; continued support    The patient's psychosocial  support needs include:  medication management or adherence    The patient's functional needs include: medication education    The patient's environmental needs include:  not applicable    Care Plan overall comments:  No data recorded    Refer to previous outreach notes for more information on the areas listed above.    Monthly Billing Diagnoses  (E11.65) Type 2 diabetes mellitus with hyperglycemia, without long-term current use of insulin    (R03.0) Elevated blood pressure reading    (E78.2) Mixed hyperlipidemia    Medications   Medications have been reconciled    Care Plan progress this month:      Recently Modified Care Plans Updates made since 10/31/2023 12:00 AM       Wellness (Adult)           Problem Priority Last Modified     Medication Adherence (Wellness) --  9/21/2023 11:44 AM by Piedad Harper, FRAN              Goal Recent Progress Last Modified     Medication Adherence Maintained On track  11/21/2023  5:15 PM by Juana James, RN     Evidence-based guidance:   Develop a complete and accurate medication list including those prescribed and over-the-counter, those taken only occasionally and those not taken by mouth such as injections, inhalers, ointments or creams and drops.   Review all medications to determine if patient or caregiver knows why the medications are given and if taken as prescribed.   Complete or review a medication adherence assessment including barriers to medication adherence.   Arrange and encourage counseling and medication review by pharmacist.   Assess barriers to medication adherence.   Manage poor understanding or health literacy by using easy to understand language, teach-back, visual aids and teaching only 2 or 3 points at a time.   Assess presence of side effects; provide suggestions to manage or reduce side effects.   Consult with provider and/or pharmacist regarding substitute medication, changing dose, simplification of regimen or safe discontinuation of some  medications.   Encourage the use of medication reminders such as clock or cell phone alarm, color coding, pillboxes for am/pm and days of the week, pharmacy refill reminder, auto-refill system or mail-order option.   Assist with resources when cost is a barrier; refer to prescription assistance programs; confirm that generics are prescribed whenever possible; consider 90-day prescriptions to reduce copay cost; synchronize refills.   Provide help to complete medication assistance applications or health insurance forms as needed.   Complete a follow-up call 2 to 3 weeks after medication self-management plan developed; assess adherence and understanding, as well as listen to patient or caregiver concerns; amend plan as needed.   Provide frequent follow-up providing motivation, encouragement and support when medication nonadherence is identified.    Notes:                     Diabetes Type 2 (Adult)           Problem Priority Last Modified     Glycemic Management (Diabetes, Type 2) --  9/21/2023 11:44 AM by Piedad Harper, RN              Goal Recent Progress Last Modified     Glycemic Management Optimized Not on track  11/21/2023  5:15 PM by Juana James RN     Evidence-based guidance:   Anticipate A1C testing (point-of-care) every 3 to 6 months based on goal attainment.   Review mutually-set A1C goal or target range.   Anticipate use of antihyperglycemic with or without insulin and periodic adjustments; consider active involvement of pharmacist.   Provide medical nutrition therapy and development of individualized eating.   Compare self-reported symptoms of hypo or hyperglycemia to blood glucose levels, diet and fluid intake, current medications, psychosocial and physiologic stressors, change in activity and barriers to care adherence.   Promote self-monitoring of blood glucose levels.   Assess and address barriers to management plan, such as food insecurity, age, developmental ability, depression, anxiety,  fear of hypoglycemia or weight gain, as well as medication cost, side effects and complicated regimen.   Consider referral to community-based diabetes education program, visiting nurse, community health worker or health .   Encourage regular dental care for treatment of periodontal disease; refer to dental provider when needed.    Notes:              Task Due Date Last Modified     Alleviate Barriers to Glycemic Management --  11/21/2023  5:16 PM by Juana James, RN     Care Management Activities:      - barriers to adherence to treatment plan identified  - blood glucose monitoring encouraged  - blood glucose readings reviewed  - resources required to improve adherence to care identified  - use of blood glucose monitoring log promoted      Notes:     Out of town and did not have log; reports running in high 200s and low 300s                              Instructions   Patient was provided an electronic copy of care plan  CCM services were explained and offered and patient has accepted these services.  Patient has given their written consent to receive CCM services and understands that this includes the authorization of electronic communication of medical information with the other treating providers.  Patient understands that they may stop CCM services at any time and these changes will be effective at the end of the calendar month and will effectively revocate the agreement of CCM services.  Patient understands that only one practitioner can furnish and be paid for CCM services during one calendar month.  Patient also understands that there may be co-payment or deductible fees in association with CCM services.  Patient will continue with at least monthly follow-up calls with the Ambulatory .    Juana FIERRO  Ambulatory Case Management    12/1/2023, 09:45 EST

## 2023-12-07 ENCOUNTER — PATIENT OUTREACH (OUTPATIENT)
Dept: CASE MANAGEMENT | Facility: OTHER | Age: 43
End: 2023-12-07
Payer: MEDICAID

## 2023-12-07 DIAGNOSIS — E11.65 TYPE 2 DIABETES MELLITUS WITH HYPERGLYCEMIA, WITHOUT LONG-TERM CURRENT USE OF INSULIN: Primary | ICD-10-CM

## 2023-12-07 DIAGNOSIS — R03.0 ELEVATED BLOOD PRESSURE READING: ICD-10-CM

## 2023-12-07 NOTE — OUTREACH NOTE
AMBULATORY CASE MANAGEMENT NOTE    Name and Relationship of Patient/Support Person: Brian Sharmaine J - Self  Self    Patient Outreach    Outgoing call to the patient for follow up outreach.  Asked to review her blood sugars and she states she does not have her paper with her today with this information but that they have been running in the 200s.  She was able to get her levimir refilled and did not require a PA as discussed last outreach.      She prefers an outreach next week to review blood sugars and work on Care Plans / goals of care    Juana FIERRO  Ambulatory Case Management    12/7/2023, 15:52 EST

## 2023-12-20 ENCOUNTER — TELEPHONE (OUTPATIENT)
Dept: CASE MANAGEMENT | Facility: OTHER | Age: 43
End: 2023-12-20
Payer: MEDICAID

## 2023-12-20 NOTE — TELEPHONE ENCOUNTER
Attempted follow CCM outreaches x 3 and have left VM with contact information requesting a return call.  Have now placed in monitoring and scheduled chart reviews x 3.   Jauna Parker RN ACM

## 2024-01-08 ENCOUNTER — OFFICE VISIT (OUTPATIENT)
Dept: INTERNAL MEDICINE | Facility: CLINIC | Age: 44
End: 2024-01-08
Payer: MEDICAID

## 2024-01-08 VITALS
DIASTOLIC BLOOD PRESSURE: 86 MMHG | WEIGHT: 207 LBS | SYSTOLIC BLOOD PRESSURE: 128 MMHG | TEMPERATURE: 97.4 F | BODY MASS INDEX: 30.66 KG/M2 | OXYGEN SATURATION: 96 % | HEART RATE: 84 BPM | RESPIRATION RATE: 16 BRPM | HEIGHT: 69 IN

## 2024-01-08 DIAGNOSIS — F41.1 GENERALIZED ANXIETY DISORDER: ICD-10-CM

## 2024-01-08 DIAGNOSIS — E11.65 TYPE 2 DIABETES MELLITUS WITH HYPERGLYCEMIA, WITHOUT LONG-TERM CURRENT USE OF INSULIN: ICD-10-CM

## 2024-01-08 DIAGNOSIS — E78.2 MIXED HYPERLIPIDEMIA: ICD-10-CM

## 2024-01-08 DIAGNOSIS — E03.9 HYPOTHYROIDISM, UNSPECIFIED TYPE: ICD-10-CM

## 2024-01-08 DIAGNOSIS — E11.65 TYPE 2 DIABETES MELLITUS WITH HYPERGLYCEMIA, WITHOUT LONG-TERM CURRENT USE OF INSULIN: Primary | ICD-10-CM

## 2024-01-08 DIAGNOSIS — N80.9 ENDOMETRIOSIS: ICD-10-CM

## 2024-01-08 LAB
BASOPHILS # BLD AUTO: 0.05 10*3/MM3 (ref 0–0.2)
BASOPHILS NFR BLD AUTO: 1.1 % (ref 0–1.5)
CHOLEST SERPL-MCNC: 250 MG/DL (ref 0–200)
DEPRECATED RDW RBC AUTO: 40.6 FL (ref 37–54)
EOSINOPHIL # BLD AUTO: 0.12 10*3/MM3 (ref 0–0.4)
EOSINOPHIL NFR BLD AUTO: 2.7 % (ref 0.3–6.2)
ERYTHROCYTE [DISTWIDTH] IN BLOOD BY AUTOMATED COUNT: 12.4 % (ref 12.3–15.4)
HBA1C MFR BLD: 11.3 % (ref 4.8–5.6)
HCT VFR BLD AUTO: 40.9 % (ref 34–46.6)
HDLC SERPL-MCNC: 38 MG/DL (ref 40–60)
HGB BLD-MCNC: 13.8 G/DL (ref 12–15.9)
IMM GRANULOCYTES # BLD AUTO: 0 10*3/MM3 (ref 0–0.05)
IMM GRANULOCYTES NFR BLD AUTO: 0 % (ref 0–0.5)
LDLC SERPL CALC-MCNC: 144 MG/DL (ref 0–100)
LDLC/HDLC SERPL: 3.63 {RATIO}
LYMPHOCYTES # BLD AUTO: 1.83 10*3/MM3 (ref 0.7–3.1)
LYMPHOCYTES NFR BLD AUTO: 40.8 % (ref 19.6–45.3)
MCH RBC QN AUTO: 30.9 PG (ref 26.6–33)
MCHC RBC AUTO-ENTMCNC: 33.7 G/DL (ref 31.5–35.7)
MCV RBC AUTO: 91.5 FL (ref 79–97)
MONOCYTES # BLD AUTO: 0.3 10*3/MM3 (ref 0.1–0.9)
MONOCYTES NFR BLD AUTO: 6.7 % (ref 5–12)
NEUTROPHILS NFR BLD AUTO: 2.19 10*3/MM3 (ref 1.7–7)
NEUTROPHILS NFR BLD AUTO: 48.7 % (ref 42.7–76)
NRBC BLD AUTO-RTO: 0 /100 WBC (ref 0–0.2)
PLATELET # BLD AUTO: 236 10*3/MM3 (ref 140–450)
PMV BLD AUTO: 12.3 FL (ref 6–12)
RBC # BLD AUTO: 4.47 10*6/MM3 (ref 3.77–5.28)
TRIGL SERPL-MCNC: 371 MG/DL (ref 0–150)
TSH SERPL DL<=0.05 MIU/L-ACNC: 1.39 UIU/ML (ref 0.27–4.2)
VLDLC SERPL-MCNC: 68 MG/DL (ref 5–40)
WBC NRBC COR # BLD AUTO: 4.49 10*3/MM3 (ref 3.4–10.8)

## 2024-01-08 PROCEDURE — 80061 LIPID PANEL: CPT | Performed by: PHYSICIAN ASSISTANT

## 2024-01-08 PROCEDURE — 83036 HEMOGLOBIN GLYCOSYLATED A1C: CPT | Performed by: PHYSICIAN ASSISTANT

## 2024-01-08 PROCEDURE — 80050 GENERAL HEALTH PANEL: CPT | Performed by: PHYSICIAN ASSISTANT

## 2024-01-08 RX ORDER — EMPAGLIFLOZIN, METFORMIN HYDROCHLORIDE 12.5; 1 MG/1; MG/1
2 TABLET, EXTENDED RELEASE ORAL DAILY
Qty: 60 TABLET | Refills: 3 | Status: SHIPPED | OUTPATIENT
Start: 2024-01-08

## 2024-01-08 RX ORDER — EMPAGLIFLOZIN, METFORMIN HYDROCHLORIDE 12.5; 1 MG/1; MG/1
2 TABLET, EXTENDED RELEASE ORAL DAILY
COMMUNITY
Start: 2023-12-07 | End: 2024-01-08 | Stop reason: SDUPTHER

## 2024-01-08 RX ORDER — LISINOPRIL 2.5 MG/1
2.5 TABLET ORAL DAILY
Qty: 90 TABLET | Refills: 1 | Status: SHIPPED | OUTPATIENT
Start: 2024-01-08

## 2024-01-08 NOTE — PROGRESS NOTES
Chief Complaint  Diabetes, ADD, and Abdominal Pain    Subjective          Sharmaine Torres presents to [facility]  Diabetes    ADD  Associated symptoms include abdominal pain.   Abdominal Pain      DM: bg is 200s  Using 30 units of insulin   She did not start Synjardy   States she had eye exam 1 yr ago at [facility].  She has glasses but vision has seemed to worsen  She has dry feet and skin cracking between the toes. She is keeping it clean and dry.     Abd pain: in in lower abd. Has swelling that comes and goes. Always happens 1 wk out of the month. She gets associated with swelling in vaginal area.   She has upcoming appt with GYN for endometriosis noted on recent CT  Past Medical History:   Diagnosis Date    Anxiety     Depression     Diabetes mellitus     Endometriosis     Hyperlipidemia     Hypothyroidism     Obesity         Past Surgical History:   Procedure Laterality Date    APPENDECTOMY       SECTION      CHOLECYSTECTOMY      HAND SURGERY      HYSTERECTOMY      TUBAL ABDOMINAL LIGATION          Current Outpatient Medications on File Prior to Visit   Medication Sig Dispense Refill    atorvastatin (LIPITOR) 40 MG tablet Take 1 tablet by mouth Daily. 90 tablet 1    BD Pen Needle Nunu 2nd Gen 32G X 4 MM misc See Admin Instructions.      Blood Glucose Monitoring Suppl w/Device kit Dispense 1 kit with monitor to check blood sugar once daily. Diagnosis: DM E11.65 1 each 0    busPIRone (BUSPAR) 10 MG tablet Take 1 tablet by mouth Every Night. 180 tablet 1    cetirizine (zyrTEC) 10 MG tablet Take 1 tablet by mouth Daily. 30 tablet 3    glucose blood test strip Check blood sugar once daily in the morning 100 each 12    Injection Device for Insulin (B-D PEN) device For insulin and ozempic 100 each 1    Lancets (freestyle) lancets Check blood sugar once daily in the morning 100 each 12    ondansetron ODT (ZOFRAN-ODT) 4 MG  "disintegrating tablet Place 1 tablet on the tongue Every 8 (Eight) Hours As Needed for Nausea or Vomiting. 6 tablet 0     No current facility-administered medications on file prior to visit.        Allergies   Allergen Reactions    Codeine Nausea And Vomiting     Pt stated tylenol codeine       Social History     Tobacco Use   Smoking Status Never   Smokeless Tobacco Never          Objective   Vital Signs:   /86 (BP Location: Left arm, Patient Position: Sitting, Cuff Size: Adult)   Pulse 84   Temp 97.4 °F (36.3 °C) (Temporal)   Resp 16   Ht 175.3 cm (69\")   Wt 93.9 kg (207 lb)   SpO2 96%   BMI 30.57 kg/m²     Physical Exam  Vitals reviewed.   Constitutional:       Appearance: Normal appearance.   HENT:      Head: Normocephalic and atraumatic.      Nose: Nose normal.      Mouth/Throat:      Mouth: Mucous membranes are moist.   Eyes:      Extraocular Movements: Extraocular movements intact.      Conjunctiva/sclera: Conjunctivae normal.      Pupils: Pupils are equal, round, and reactive to light.   Cardiovascular:      Rate and Rhythm: Normal rate and regular rhythm.   Pulmonary:      Effort: Pulmonary effort is normal.      Breath sounds: Normal breath sounds.   Abdominal:      General: Abdomen is flat. Bowel sounds are normal.      Palpations: Abdomen is soft.   Musculoskeletal:         General: Normal range of motion.   Feet:      Right foot:      Skin integrity: Skin breakdown present.      Left foot:      Skin integrity: Skin breakdown present.      Comments: Between toes  Neurological:      General: No focal deficit present.      Mental Status: She is alert and oriented to person, place, and time.   Psychiatric:         Mood and Affect: Mood normal.        Result Review :                 Assessment and Plan    Diagnoses and all orders for this visit:    1. Type 2 diabetes mellitus with hyperglycemia, without long-term current use of insulin (Primary)  Assessment & Plan:  Diabetes is unchanged.   Will " restart Synjardy and increase insulin to 35 units  Diabetes will be reassessed in 1 month.    Orders:  -     Comprehensive Metabolic Panel  -     CBC & Differential  -     Hemoglobin A1c  -     insulin detemir (LEVEMIR) 100 UNIT/ML injection; Inject 35 Units under the skin into the appropriate area as directed Daily.  Dispense: 30 mL; Refill: 0  -     Ambulatory Referral for Diabetic Eye Exam-Ophthalmology  -     Ambulatory Referral to Podiatry  -     lisinopril (Zestril) 2.5 MG tablet; Take 1 tablet by mouth Daily.  Dispense: 90 tablet; Refill: 1    2. Hypothyroidism, unspecified type  Comments:  Previously abnormal thyroid labs, will repeat today  Orders:  -     TSH    3. Generalized anxiety disorder  Comments:  Mood controlled    4. Mixed hyperlipidemia  Comments:  Labs today to eval  Orders:  -     Lipid Panel    5. Type 2 diabetes mellitus with hyperglycemia, without long-term current use of insulin  Comments:  Most likely expected for A1c to increase.  Patient has been taking metformin intermittently.  Discussed medication compliance  Assessment & Plan:  Diabetes is unchanged.   Will restart Synjardy and increase insulin to 35 units  Diabetes will be reassessed in 1 month.    Orders:  -     Comprehensive Metabolic Panel  -     CBC & Differential  -     Hemoglobin A1c  -     insulin detemir (LEVEMIR) 100 UNIT/ML injection; Inject 35 Units under the skin into the appropriate area as directed Daily.  Dispense: 30 mL; Refill: 0  -     Ambulatory Referral for Diabetic Eye Exam-Ophthalmology  -     Ambulatory Referral to Podiatry  -     lisinopril (Zestril) 2.5 MG tablet; Take 1 tablet by mouth Daily.  Dispense: 90 tablet; Refill: 1    6. Endometriosis  Comments:  reviewed CT Abd. Pt will keep upcoming appt with gyn to discuss.    Other orders  -     Synjardy XR 12.5-1000 MG tablet sustained-release 24 hour; Take 2 tablets by mouth Daily.  Dispense: 60 tablet; Refill: 3        Follow Up   Return in about 1 month  (around 2/8/2024).  Patient was given instructions and counseling regarding her condition or for health maintenance advice. Please see specific information pulled into the AVS if appropriate.

## 2024-01-09 LAB
ALBUMIN SERPL-MCNC: 4.1 G/DL (ref 3.5–5.2)
ALBUMIN/GLOB SERPL: 1.5 G/DL
ALP SERPL-CCNC: 107 U/L (ref 39–117)
ALT SERPL W P-5'-P-CCNC: 17 U/L (ref 1–33)
ANION GAP SERPL CALCULATED.3IONS-SCNC: 12.8 MMOL/L (ref 5–15)
AST SERPL-CCNC: 19 U/L (ref 1–32)
BILIRUB SERPL-MCNC: <0.2 MG/DL (ref 0–1.2)
BUN SERPL-MCNC: 8 MG/DL (ref 6–20)
BUN/CREAT SERPL: 14.5 (ref 7–25)
CALCIUM SPEC-SCNC: 9 MG/DL (ref 8.6–10.5)
CHLORIDE SERPL-SCNC: 98 MMOL/L (ref 98–107)
CO2 SERPL-SCNC: 24.2 MMOL/L (ref 22–29)
CREAT SERPL-MCNC: 0.55 MG/DL (ref 0.57–1)
EGFRCR SERPLBLD CKD-EPI 2021: 118.3 ML/MIN/1.73
GLOBULIN UR ELPH-MCNC: 2.8 GM/DL
GLUCOSE SERPL-MCNC: 375 MG/DL (ref 65–99)
POTASSIUM SERPL-SCNC: 4.1 MMOL/L (ref 3.5–5.2)
PROT SERPL-MCNC: 6.9 G/DL (ref 6–8.5)
SODIUM SERPL-SCNC: 135 MMOL/L (ref 136–145)

## 2024-01-10 ENCOUNTER — TELEPHONE (OUTPATIENT)
Dept: INTERNAL MEDICINE | Facility: CLINIC | Age: 44
End: 2024-01-10

## 2024-01-10 NOTE — TELEPHONE ENCOUNTER
Called and left pt message to call back and discuss Lab Results. Pt needs follow up appt scheduled next week with Adri Reeder to discuss diabetes management.

## 2024-01-10 NOTE — ASSESSMENT & PLAN NOTE
Diabetes is unchanged.   Will restart Synjardy and increase insulin to 35 units  Diabetes will be reassessed in 1 month.

## 2024-02-21 ENCOUNTER — OFFICE VISIT (OUTPATIENT)
Dept: OBSTETRICS AND GYNECOLOGY | Facility: CLINIC | Age: 44
End: 2024-02-21
Payer: MEDICAID

## 2024-02-21 VITALS
HEIGHT: 69 IN | DIASTOLIC BLOOD PRESSURE: 92 MMHG | WEIGHT: 205 LBS | HEART RATE: 86 BPM | SYSTOLIC BLOOD PRESSURE: 130 MMHG | BODY MASS INDEX: 30.36 KG/M2

## 2024-02-21 DIAGNOSIS — N80.129 ENDOMETRIOMA: ICD-10-CM

## 2024-02-21 DIAGNOSIS — G89.29 CHRONIC PELVIC PAIN IN FEMALE: Primary | ICD-10-CM

## 2024-02-21 DIAGNOSIS — R10.2 CHRONIC PELVIC PAIN IN FEMALE: Primary | ICD-10-CM

## 2024-02-21 DIAGNOSIS — N80.9 ENDOMETRIOSIS: ICD-10-CM

## 2024-02-21 PROBLEM — R87.613 HGSIL ON PAP SMEAR OF CERVIX: Status: ACTIVE | Noted: 2019-05-28

## 2024-02-21 PROBLEM — N81.6 RECTOCELE: Status: ACTIVE | Noted: 2019-04-03

## 2024-02-21 PROCEDURE — 1160F RVW MEDS BY RX/DR IN RCRD: CPT | Performed by: OBSTETRICS & GYNECOLOGY

## 2024-02-21 PROCEDURE — 1159F MED LIST DOCD IN RCRD: CPT | Performed by: OBSTETRICS & GYNECOLOGY

## 2024-02-21 PROCEDURE — 99204 OFFICE O/P NEW MOD 45 MIN: CPT | Performed by: OBSTETRICS & GYNECOLOGY

## 2024-02-21 NOTE — PROGRESS NOTES
"GYN Visit    CC: Endometriosis    HPI:   43 y.o. who presents to discuss pelvic and abdominal pain as well as endometriosis.  The patient reports that she was diagnosed with endometriosis a few years ago.  Her gynecologist at that time placed her on Orilissa.  The Orilissa worked very well at controlling her symptoms.  She took Orilissa for over a year.  After taking Orilissa for a prolonged period of time it was recommended she undergo hysterectomy.  She reports that her ovaries were retained at the time of her hysterectomy due to her age.  She reports that she also had an endometrioma in the abdominal wall removed at the time of that surgery.  She is continue to have off-and-on pain since surgery.  Of recent is gotten much worse.  She had a recent CT scan showing 2 lesions which were thought to be possible endometriosis.  She reports that she has significant pain with intercourse and has to avoid intercourse due to that pain.  She denies any vaginal bleeding.    History: PMHx, Meds, Allergies, PSHx, Social Hx, and POBHx all reviewed and updated.    /92   Pulse 86   Ht 175.3 cm (69\")   Wt 93 kg (205 lb)   BMI 30.27 kg/m²     Physical Exam  Vitals and nursing note reviewed.   Constitutional:       General: She is not in acute distress.     Appearance: Normal appearance. She is not ill-appearing.   HENT:      Head: Normocephalic and atraumatic.   Abdominal:      General: Abdomen is flat. There is no distension.      Palpations: Abdomen is soft. There is mass.      Tenderness: There is abdominal tenderness. There is no guarding or rebound.      Hernia: No hernia is present.          Comments: There are 2 firm palpable masses deep in the abdominal wall.  1 in the right lower quadrant feels to be within the rectus muscle.  1 in the suprapubic area that feels to be somewhat deeper.  Both masses feel firm, almost fibrotic and are tender with palpation.  No rebound or guarding is present.  These masses do " feel to be in the abdominal wall and not the abdominal cavity.   Musculoskeletal:         General: No swelling.      Right lower leg: No edema.      Left lower leg: No edema.   Skin:     General: Skin is warm and dry.      Findings: No rash.   Neurological:      Mental Status: She is alert and oriented to person, place, and time.   Psychiatric:         Mood and Affect: Mood normal.         Thought Content: Thought content normal.         Judgment: Judgment normal.     CT of the abdomen pelvis 10/17/2023 reviewed  CT of the abdomen pelvis 2/10/2022 reviewed  Ultrasound-guided drainage 7/15/2021 reviewed  Abdominal ultrasound 7/13/2021 reviewed  Pelvic ultrasound 5/21/2021 reviewed  Labs 1/8/2024 reviewed including lipid profile, CBC, A1c, TSH and free T4      ASSESSMENT AND PLAN:  Diagnoses and all orders for this visit:    1. Chronic pelvic pain in female (Primary)  Assessment & Plan:  I strongly suspect the patient's pelvic pain is related to her endometriosis.  The patient underwent hysterectomy but with ovarian conservation.  She denies being on treatment for her endometriosis since her hysterectomy.  I think it is also high likelihood the abdominal wall masses are endometriomas that have recurred.  I recommended considering a GnRH agonist to see if we can get regression of these masses and improvement in her symptoms.  If this is successful, I would recommend considering laparoscopic BSO.  Given the size of the masses that are present and their depth in the abdominal wall I am not sure that resection of these masses is in the patient's best interest.      2. Endometriosis  Overview:  Formatting of this note might be different from the original.   Added automatically from request for surgery [id number]    Assessment & Plan:  Recommended proceeding with treatment with a GnRH agonist once again.  We discussed the available options including Orilissa, Lupron, Lupaneta and Synarel.  The patient wants to try the Lupaneta  injections.  Will start Lupaneta as soon as possible.    Orders:  -     leuprolide (Lupron Depot, 1-Month,) 3.75 MG injection; Inject 3.75 mg into the appropriate muscle as directed by prescriber Every 28 (Twenty-Eight) Days.    3. Endometrioma  Overview:  Abdominal wall    Assessment & Plan:  Given the size of the masses that are present and their depth in the abdominal wall I am not sure that resection of these masses is in the patient's best interest.  This could lead to significant scar tissue that may cause as much or more pain in the masses themselves, particularly if we proceed with laparoscopic BSO.  It could also require significant resection of the rectus muscle and rectus sheath on that side of the abdomen which could also complicate her healing and could result in chronic pain from the resection.          Follow Up:  Return in about 6 weeks (around 4/3/2024) for Recheck.    Abbe Kowalski MD  02/21/2024

## 2024-02-22 PROBLEM — N80.129 ENDOMETRIOMA: Status: ACTIVE | Noted: 2024-02-22

## 2024-02-22 RX ORDER — LEUPROLIDE ACETATE 3.75 MG
3.75 KIT INTRAMUSCULAR
Start: 2024-02-22

## 2024-02-22 NOTE — ASSESSMENT & PLAN NOTE
I strongly suspect the patient's pelvic pain is related to her endometriosis.  The patient underwent hysterectomy but with ovarian conservation.  She denies being on treatment for her endometriosis since her hysterectomy.  I think it is also high likelihood the abdominal wall masses are endometriomas that have recurred.  I recommended considering a GnRH agonist to see if we can get regression of these masses and improvement in her symptoms.  If this is successful, I would recommend considering laparoscopic BSO.  Given the size of the masses that are present and their depth in the abdominal wall I am not sure that resection of these masses is in the patient's best interest.

## 2024-02-22 NOTE — ASSESSMENT & PLAN NOTE
Recommended proceeding with treatment with a GnRH agonist once again.  We discussed the available options including Orilissa, Lupron, Lupaneta and Synarel.  The patient wants to try the Lupaneta injections.  Will start Lupaneta as soon as possible.

## 2024-02-22 NOTE — ASSESSMENT & PLAN NOTE
Given the size of the masses that are present and their depth in the abdominal wall I am not sure that resection of these masses is in the patient's best interest.  This could lead to significant scar tissue that may cause as much or more pain in the masses themselves, particularly if we proceed with laparoscopic BSO.  It could also require significant resection of the rectus muscle and rectus sheath on that side of the abdomen which could also complicate her healing and could result in chronic pain from the resection.

## 2024-02-28 ENCOUNTER — TELEPHONE (OUTPATIENT)
Dept: OBSTETRICS AND GYNECOLOGY | Facility: CLINIC | Age: 44
End: 2024-02-28
Payer: MEDICAID

## 2024-02-28 NOTE — TELEPHONE ENCOUNTER
LVM - Lupron 3.75 arrived at [place] office / ask patient to call and make an injection only appointment  patient will need a follow up office visit 6-8 weeks after starting injections

## 2024-03-04 ENCOUNTER — CLINICAL SUPPORT (OUTPATIENT)
Dept: OBSTETRICS AND GYNECOLOGY | Facility: CLINIC | Age: 44
End: 2024-03-04
Payer: MEDICAID

## 2024-03-04 ENCOUNTER — OFFICE VISIT (OUTPATIENT)
Dept: PODIATRY | Facility: CLINIC | Age: 44
End: 2024-03-04
Payer: MEDICAID

## 2024-03-04 VITALS
WEIGHT: 202 LBS | BODY MASS INDEX: 29.92 KG/M2 | OXYGEN SATURATION: 95 % | TEMPERATURE: 97.8 F | HEIGHT: 69 IN | HEART RATE: 86 BPM | SYSTOLIC BLOOD PRESSURE: 115 MMHG | DIASTOLIC BLOOD PRESSURE: 85 MMHG

## 2024-03-04 DIAGNOSIS — B35.3 TINEA PEDIS OF LEFT FOOT: ICD-10-CM

## 2024-03-04 DIAGNOSIS — R10.2 CHRONIC PELVIC PAIN IN FEMALE: Primary | ICD-10-CM

## 2024-03-04 DIAGNOSIS — E11.8 TYPE 2 DIABETES MELLITUS WITH COMPLICATION: ICD-10-CM

## 2024-03-04 DIAGNOSIS — G89.29 CHRONIC PELVIC PAIN IN FEMALE: Primary | ICD-10-CM

## 2024-03-04 DIAGNOSIS — E11.65 TYPE 2 DIABETES MELLITUS WITH HYPERGLYCEMIA, WITHOUT LONG-TERM CURRENT USE OF INSULIN: Primary | ICD-10-CM

## 2024-03-04 PROCEDURE — 96372 THER/PROPH/DIAG INJ SC/IM: CPT | Performed by: OBSTETRICS & GYNECOLOGY

## 2024-03-04 RX ORDER — CLOTRIMAZOLE AND BETAMETHASONE DIPROPIONATE 10; .64 MG/G; MG/G
1 CREAM TOPICAL 2 TIMES DAILY
Qty: 45 G | Refills: 2 | Status: SHIPPED | OUTPATIENT
Start: 2024-03-04

## 2024-03-04 NOTE — PROGRESS NOTES
Patient received Depo Lupron today  Administered in Right  Ventrogluteal  Tolerated injection well  No reaction after 15 minutes  Pt supplied

## 2024-03-06 NOTE — PROGRESS NOTES
[facility] - PODIATRY    Today's Date: 24    Patient Name: Sharmaine Torres  MRN: [id number]  CSN: [id number]  PCP: Adri Reeder PA-C,   Referring Provider: Adri Reeder PA-C    SUBJECTIVE     Chief Complaint   Patient presents with    Left Foot - Establish Care, Annual Exam, Diabetes, Foot Ulcer     Went to  on 24 for ulcer   Most recent blood sugar today was 300+    Right Foot - Establish Care, Annual Exam, Diabetes     HPI: Sharmaine Torres, a 43 y.o.female, presents to clinic for a diabetic foot evaluation.    New    Onset: Insidious    Nature:  Dryness interdigital 3rd space    Stable    Patient controlling diabetes via:  Medication     Patient states there last blood glucose was:  >100    Patient denies any fevers, chills, nausea, vomiting, shortness of breath, nor any other constitutional signs nor symptoms.    No other pedal complaints at this time.    Past Medical History:   Diagnosis Date    Abnormal Pap smear of cervix     precancerous cells pt had hyst for this issue    Anxiety     Depression     Diabetes mellitus     Endometriosis     Foot ulcer     Hyperlipidemia     Hypothyroidism     Obesity      Past Surgical History:   Procedure Laterality Date    APPENDECTOMY  2005    CERVICAL CONE BIOPSY      several according to pt     SECTION      CHOLECYSTECTOMY      HAND SURGERY      HYSTERECTOMY      TUBAL ABDOMINAL LIGATION       Family History   Problem Relation Age of Onset    Diabetes Father     Stroke Paternal Grandmother     Crohn's disease Maternal Grandmother     Breast cancer Paternal Aunt 45    Prostate cancer Neg Hx     Colon cancer Neg Hx     Ovarian cancer Neg Hx     Uterine cancer Neg Hx      Social History     Socioeconomic History    Marital status:    Tobacco Use    Smoking status: Never    Smokeless tobacco: Never   Vaping Use    Vaping status: Every Day    Substances: Nicotine, Flavoring    Devices: Disposable    Substance and Sexual Activity    Alcohol use: Never    Drug use: Never    Sexual activity: Yes     Partners: Male     Birth control/protection: Hysterectomy     Allergies   Allergen Reactions    Codeine Nausea And Vomiting     Pt stated tylenol codeine     Current Outpatient Medications   Medication Sig Dispense Refill    atorvastatin (LIPITOR) 40 MG tablet Take 1 tablet by mouth Daily. 90 tablet 1    BD Pen Needle Nunu 2nd Gen 32G X 4 MM misc See Admin Instructions.      Blood Glucose Monitoring Suppl w/Device kit Dispense 1 kit with monitor to check blood sugar once daily. Diagnosis: DM E11.65 1 each 0    busPIRone (BUSPAR) 10 MG tablet Take 1 tablet by mouth Every Night. 180 tablet 1    cetirizine (zyrTEC) 10 MG tablet Take 1 tablet by mouth Daily. 30 tablet 3    clotrimazole (LOTRIMIN) 1 % cream Apply 1 Application topically to the appropriate area as directed 2 (Two) Times a Day. 45 g 0    glucose blood test strip Check blood sugar once daily in the morning 100 each 12    Injection Device for Insulin (B-D PEN) device For insulin and ozempic 100 each 1    insulin detemir (LEVEMIR) 100 UNIT/ML injection Inject 35 Units under the skin into the appropriate area as directed Daily. 30 mL 0    Lancets (freestyle) lancets Check blood sugar once daily in the morning 100 each 12    leuprolide (Lupron Depot, 1-Month,) 3.75 MG injection Inject 3.75 mg into the appropriate muscle as directed by prescriber Every 28 (Twenty-Eight) Days.      lisinopril (Zestril) 2.5 MG tablet Take 1 tablet by mouth Daily. 90 tablet 1    ondansetron ODT (ZOFRAN-ODT) 4 MG disintegrating tablet Place 1 tablet on the tongue Every 8 (Eight) Hours As Needed for Nausea or Vomiting. 6 tablet 0    Synjardy XR 12.5-1000 MG tablet sustained-release 24 hour Take 2 tablets by mouth Daily. 60 tablet 3    cephalexin (KEFLEX) 500 MG capsule Take 1 capsule by mouth 3 (Three) Times a Day. (Patient not taking: Reported on 3/4/2024) 21 capsule 0     clotrimazole-betamethasone (LOTRISONE) 1-0.05 % cream Apply 1 Application topically to the appropriate area as directed 2 (Two) Times a Day. Apply to affected area twice daily 45 g 2    fluconazole (Diflucan) 150 MG tablet Take 1 tablet by mouth every other day (Patient not taking: Reported on 3/4/2024) 3 tablet 0     No current facility-administered medications for this visit.     Review of Systems   Constitutional: Negative.    HENT: Negative.     Eyes: Negative.    Respiratory: Negative.     Cardiovascular: Negative.    Gastrointestinal: Negative.    Endocrine: Negative.    Genitourinary: Negative.    Musculoskeletal: Negative.    Skin: Negative.    Allergic/Immunologic: Negative.    Neurological: Negative.    Hematological: Negative.    Psychiatric/Behavioral: Negative.     All other systems reviewed and are negative.      OBJECTIVE     Vitals:    03/04/24 1328   BP: 115/85   Pulse: 86   Temp: 97.8 °F (36.6 °C)   SpO2: 95%       Body mass index is 29.83 kg/m².    Lab Results   Component Value Date    HGBA1C 11.30 (H) 01/08/2024       Lab Results   Component Value Date    GLUCOSE 375 (H) 01/08/2024    CALCIUM 9.0 01/08/2024     (L) 01/08/2024    K 4.1 01/08/2024    CO2 24.2 01/08/2024    CL 98 01/08/2024    BUN 8 01/08/2024    CREATININE 0.55 (L) 01/08/2024    EGFRIFAFRI >60 02/10/2022    BCR 14.5 01/08/2024    ANIONGAP 12.8 01/08/2024       Patient seen in no apparent distress.      PHYSICAL EXAM:     Foot/Ankle Exam    GENERAL  Appearance:  appears stated age  Orientation:  AAOx3  Affect:  appropriate  Gait:  unimpaired  Assistance:  independent  Right shoe gear: casual shoe  Left shoe gear: casual shoe    VASCULAR     Right Foot Vascularity   Normal vascular exam    Dorsalis pedis:  2+  Posterior tibial:  2+  Skin temperature:  warm  Edema grading:  None  CFT:  < 3 seconds  Pedal hair growth:  Present  Varicosities:  none     Left Foot Vascularity   Normal vascular exam    Dorsalis pedis:  2+  Posterior  tibial:  2+  Skin temperature:  warm  Edema grading:  None  CFT:  < 3 seconds  Pedal hair growth:  Present  Varicosities:  none     NEUROLOGIC     Right Foot Neurologic   Normal sensation    Light touch sensation: normal  Vibratory sensation: normal  Hot/Cold sensation: normal  Protective Sensation using [place] Monofilament:   Sites intact: 10  Sites tested: 10     Left Foot Neurologic   Normal sensation    Light touch sensation: normal  Vibratory sensation: normal  Hot/Cold sensation:  normal  Protective Sensation using [place]emio Monofilament:   Sites intact: 10  Sites tested: 10    MUSCLE STRENGTH     Right Foot Muscle Strength   Foot dorsiflexion:  4  Foot plantar flexion:  4  Foot inversion:  4  Foot eversion:  4     Left Foot Muscle Strength   Foot dorsiflexion:  4  Foot plantar flexion:  4  Foot inversion:  4  Foot eversion:  4    RANGE OF MOTION     Right Foot Range of Motion   Foot and ankle ROM within normal limits       Left Foot Range of Motion   Foot and ankle ROM within normal limits      DERMATOLOGIC      Right Foot Dermatologic   Skin  Right foot skin is intact.      Left Foot Dermatologic   Skin  Left foot skin is intact.      Left foot additional comments: Dry flaky skin to 3rd interspace             ASSESSMENT/PLAN     Diagnoses and all orders for this visit:    1. Type 2 diabetes mellitus with hyperglycemia, without long-term current use of insulin (Primary)    2. Type 2 diabetes mellitus with complication    3. Tinea pedis of left foot    Other orders  -     clotrimazole-betamethasone (LOTRISONE) 1-0.05 % cream; Apply 1 Application topically to the appropriate area as directed 2 (Two) Times a Day. Apply to affected area twice daily  Dispense: 45 g; Refill: 2        Comprehensive lower extremity examination and evaluation was performed.    Discussed findings and treatment plan including risks, benefits, and treatment options with patient in detail. Patient agreed with treatment  plan.    Medications and allergies reviewed.  Reviewed available blood glucose and HgB A1C lab values along with other pertinent labs.  These were discussed with the patient as to their importance of diabetic maintenance.    Diabetic foot exam performed and documented this date, compliant with CQM required standards. Detail of findings as noted in physical exam.  Lower extremity Neurologic exam for diabetic patient performed and documented this date, compliant with PQRS required standards. Detail of findings as noted in physical exam.  Advised patient importance of good routine lower extremity hygiene. Advised patient importance of evaluating for intact skin and pain free nail borders.  Advised patient to use mirror to evaluate plantar/ soles of feet for better visualization. Advised patient monitor and phone office to be seen if any cracking to skin, open lesions, painful nail borders or if nails become elongated prior to next visit. Advised patient importance of daily cleansing of lower extremities, followed by good skin cream to maintain normal hydration of skin. Also advised patient importance of close daily monitoring of blood sugar. Advised to regulate diet and medications to maintain control of blood sugar in optimal range. Contact primary care provider if difficulties maintaining blood sugar levels.  Advised Patient of presence of Diabetes Mellitus condition.  Advised Patient risk of progression and worsening or improvement, then return of condition.  Will monitor condition for any change in future. Treat with most appropriate treatment pending status of condition.  Counseled and advised patient extensively on nature and ramifications of diabetes. Standard instructions given to patient for good diabetic foot care and maintenance. Advised importance of careful monitoring to avoid break down and complications secondary to diabetes. Advised patient importance of strict maintenance of blood sugar control.  Advised patient of possible ominous results from neglect of condition, i.e.: amputation/ loss of digits, feet and legs, or even death.  Patient states understands counseling, will monitor closely, continue good hygiene and routine diabetic foot care. Patient will contact office if questions or problems.      An After Visit Summary was printed and given to the patient at discharge, including (if requested) any available informative/educational handouts regarding diagnosis, treatment, or medications. All questions were answered to patient/family satisfaction. Should symptoms fail to improve or worsen they agree to call or return to clinic or to go to the Emergency Department. Discussed the importance of following up with any needed screening tests/labs/specialist appointments and any requested follow-up recommended by me today. Importance of maintaining follow-up discussed and patient accepts that missed appointments can delay diagnosis and potentially lead to worsening of conditions.    Return in about 6 months (around 9/4/2024)., or sooner if acute issues arise.    This document has been electronically signed by Shaun Pitts DPM on March 6, 2024 11:22 EST

## 2024-03-08 RX ORDER — CETIRIZINE HYDROCHLORIDE 10 MG/1
10 TABLET ORAL DAILY
Qty: 30 TABLET | Refills: 3 | Status: SHIPPED | OUTPATIENT
Start: 2024-03-08

## 2024-03-08 NOTE — TELEPHONE ENCOUNTER
Rx Refill Note  Requested Prescriptions     Pending Prescriptions Disp Refills    cetirizine (zyrTEC) 10 MG tablet 30 tablet 3     Sig: Take 1 tablet by mouth Daily.      Last office visit with prescribing clinician: 1/8/2024   Last telemedicine visit with prescribing clinician: Visit date not found   Next office visit with prescribing clinician: Visit date not found                         Would you like a call back once the refill request has been completed: [] Yes [] No    If the office needs to give you a call back, can they leave a voicemail: [] Yes [] No    Raj Alejandre MA  03/08/24, 11:43 EST

## 2024-03-22 ENCOUNTER — TELEPHONE (OUTPATIENT)
Dept: OBSTETRICS AND GYNECOLOGY | Facility: CLINIC | Age: 44
End: 2024-03-22
Payer: MEDICAID

## 2024-04-01 ENCOUNTER — CLINICAL SUPPORT (OUTPATIENT)
Dept: OBSTETRICS AND GYNECOLOGY | Facility: CLINIC | Age: 44
End: 2024-04-01
Payer: MEDICAID

## 2024-04-01 DIAGNOSIS — R10.2 CHRONIC PELVIC PAIN IN FEMALE: Primary | ICD-10-CM

## 2024-04-01 DIAGNOSIS — N80.9 ENDOMETRIOSIS: ICD-10-CM

## 2024-04-01 DIAGNOSIS — G89.29 CHRONIC PELVIC PAIN IN FEMALE: Primary | ICD-10-CM

## 2024-04-08 RX ORDER — PEN NEEDLE, DIABETIC 32GX 5/32"
NEEDLE, DISPOSABLE MISCELLANEOUS SEE ADMIN INSTRUCTIONS
Qty: 100 EACH | Refills: 0 | Status: SHIPPED | OUTPATIENT
Start: 2024-04-08

## 2024-04-24 ENCOUNTER — TELEPHONE (OUTPATIENT)
Dept: OBSTETRICS AND GYNECOLOGY | Facility: CLINIC | Age: 44
End: 2024-04-24

## 2024-04-24 ENCOUNTER — OFFICE VISIT (OUTPATIENT)
Dept: OBSTETRICS AND GYNECOLOGY | Facility: CLINIC | Age: 44
End: 2024-04-24
Payer: MEDICAID

## 2024-04-24 VITALS
DIASTOLIC BLOOD PRESSURE: 90 MMHG | BODY MASS INDEX: 30.36 KG/M2 | WEIGHT: 205 LBS | HEIGHT: 69 IN | SYSTOLIC BLOOD PRESSURE: 131 MMHG

## 2024-04-24 DIAGNOSIS — G89.29 CHRONIC PELVIC PAIN IN FEMALE: Primary | ICD-10-CM

## 2024-04-24 DIAGNOSIS — N95.1 MENOPAUSAL SYMPTOMS: ICD-10-CM

## 2024-04-24 DIAGNOSIS — N80.129 ENDOMETRIOMA: ICD-10-CM

## 2024-04-24 DIAGNOSIS — R10.2 CHRONIC PELVIC PAIN IN FEMALE: Primary | ICD-10-CM

## 2024-04-24 DIAGNOSIS — N80.9 ENDOMETRIOSIS: ICD-10-CM

## 2024-04-24 PROCEDURE — 99213 OFFICE O/P EST LOW 20 MIN: CPT | Performed by: OBSTETRICS & GYNECOLOGY

## 2024-04-24 RX ORDER — MELOXICAM 15 MG/1
15 TABLET ORAL DAILY
Qty: 30 TABLET | Refills: 3 | Status: SHIPPED | OUTPATIENT
Start: 2024-04-24

## 2024-04-24 NOTE — TELEPHONE ENCOUNTER
Caller: Sharmaine Torres    Relationship: Self    Best call back number: [phone]    What is the best time to reach you: ANY    Who are you requesting to speak with (clinical staff, provider,  specific staff member):     Do you know the name of the person who called:     What was the call regarding: SHE IS STILL IN PARKING LOT, UNKNOWN    Is it okay if the provider responds through MyChart:

## 2024-04-24 NOTE — PROGRESS NOTES
"[facility]  Gynecological Visit    CC: Follow-up meds    Subjective:   43 y.o. who presents in follow-up of the use of Lupaneta in treatment of endometriosis and suspected abdominal wall endometriomas.  She does feel that the medicine is overall helped.  She has had less pain.  She does have some increased pain now, and is not sure if she is due for another dose of medication.  No other problems or concerns today.    History:   Past medical history, medications, allergies, surgical history, social history, and obstetrical history all reviewed and updated.    Last Completed Pap Smear       This patient has no relevant Health Maintenance data.          Objective:/90   Ht 175.3 cm (69.02\")   Wt 93 kg (205 lb)   BMI 30.26 kg/m²     Physical Exam  Vitals and nursing note reviewed.   Constitutional:       General: She is not in acute distress.     Appearance: Normal appearance. She is not ill-appearing.   HENT:      Head: Normocephalic and atraumatic.   Neck:      Thyroid: No thyroid mass or thyromegaly.   Abdominal:      General: Abdomen is flat. There is no distension.      Palpations: Abdomen is soft. There is no mass.      Tenderness: There is abdominal tenderness. There is no guarding or rebound.          Comments: There is some mild tenderness to palpation in the lower abdominal wall.  It does seem improved from the patient's last visit.  There are 2 distinct palpable areas in the abdominal wall and one just to the right of the midline in the lower abdomen and another just to the left of the midline just above the mons pubis.  These areas were palpable previously and are suspected to be endometriomas.  They do feel to be smaller in size, and overall less prominent.   Musculoskeletal:         General: No swelling.      Right lower leg: No edema.      Left lower leg: No edema.   Skin:     General: Skin is warm and dry.      Findings: No rash.   Neurological:      Mental Status: She is " alert and oriented to person, place, and time.   Psychiatric:         Mood and Affect: Mood normal.         Behavior: Behavior normal.         Thought Content: Thought content normal.       Assessment and Plan:  Diagnoses and all orders for this visit:    1. Chronic pelvic pain in female (Primary)  -     meloxicam (MOBIC) 15 MG tablet; Take 1 tablet by mouth Daily.  Dispense: 30 tablet; Refill: 3    2. Menopausal symptoms  Assessment & Plan:  The patient is not using Aygestin for add back therapy at this time.  Add Aygestin 5 mg daily for add back therapy to see if this will help mitigate some of the menopausal symptoms.    Orders:  -     norethindrone (Aygestin) 5 MG tablet; Take 1 tablet by mouth Daily.  Dispense: 30 tablet; Refill: 6    3. Endometrioma  Overview:  Abdominal wall      4. Endometriosis  Overview:  Formatting of this note might be different from the original.   Added automatically from request for surgery [id number]    Assessment & Plan:  Continue Lupaneta monthly.          Counseling:     Follow Up:  Return in about 3 months (around 7/24/2024) for Recheck.        Abbe Kowalski MD  04/24/2024

## 2024-04-25 PROBLEM — R10.2 PELVIC PAIN: Status: ACTIVE | Noted: 2024-04-25

## 2024-04-25 PROBLEM — N95.1 MENOPAUSAL SYMPTOMS: Status: ACTIVE | Noted: 2024-04-25

## 2024-04-25 NOTE — ASSESSMENT & PLAN NOTE
The patient is not using Aygestin for add back therapy at this time.  Add Aygestin 5 mg daily for add back therapy to see if this will help mitigate some of the menopausal symptoms.

## 2024-05-01 ENCOUNTER — CLINICAL SUPPORT (OUTPATIENT)
Dept: OBSTETRICS AND GYNECOLOGY | Facility: CLINIC | Age: 44
End: 2024-05-01
Payer: MEDICAID

## 2024-05-01 DIAGNOSIS — G89.29 CHRONIC PELVIC PAIN IN FEMALE: Primary | ICD-10-CM

## 2024-05-01 DIAGNOSIS — N80.9 ENDOMETRIOSIS: ICD-10-CM

## 2024-05-01 DIAGNOSIS — R10.2 CHRONIC PELVIC PAIN IN FEMALE: Primary | ICD-10-CM

## 2024-05-01 NOTE — PROGRESS NOTES
Patient received Lupron Depo today  Administered in Right  Ventrogluteal  Next injection due between: In 1 month  Beta HCG: was not done  Urine HCG:was not done  [place] in the last two weeks(NA if pt has been receiving depo):not applicable

## 2024-05-23 ENCOUNTER — OFFICE VISIT (OUTPATIENT)
Dept: INTERNAL MEDICINE | Facility: CLINIC | Age: 44
End: 2024-05-23
Payer: MEDICAID

## 2024-05-23 VITALS
HEIGHT: 69 IN | HEART RATE: 88 BPM | OXYGEN SATURATION: 96 % | SYSTOLIC BLOOD PRESSURE: 132 MMHG | WEIGHT: 204 LBS | TEMPERATURE: 97.8 F | BODY MASS INDEX: 30.21 KG/M2 | RESPIRATION RATE: 16 BRPM | DIASTOLIC BLOOD PRESSURE: 88 MMHG

## 2024-05-23 DIAGNOSIS — Z79.4 TYPE 2 DIABETES MELLITUS WITH HYPERGLYCEMIA, WITH LONG-TERM CURRENT USE OF INSULIN: ICD-10-CM

## 2024-05-23 DIAGNOSIS — G89.29 CHRONIC PELVIC PAIN IN FEMALE: ICD-10-CM

## 2024-05-23 DIAGNOSIS — E11.65 TYPE 2 DIABETES MELLITUS WITH HYPERGLYCEMIA, WITH LONG-TERM CURRENT USE OF INSULIN: ICD-10-CM

## 2024-05-23 DIAGNOSIS — E03.9 HYPOTHYROIDISM, UNSPECIFIED TYPE: ICD-10-CM

## 2024-05-23 DIAGNOSIS — F41.1 GENERALIZED ANXIETY DISORDER: ICD-10-CM

## 2024-05-23 DIAGNOSIS — E11.65 TYPE 2 DIABETES MELLITUS WITH HYPERGLYCEMIA, WITHOUT LONG-TERM CURRENT USE OF INSULIN: Primary | ICD-10-CM

## 2024-05-23 DIAGNOSIS — R10.2 CHRONIC PELVIC PAIN IN FEMALE: ICD-10-CM

## 2024-05-23 DIAGNOSIS — Z91.199 NONCOMPLIANCE: ICD-10-CM

## 2024-05-23 DIAGNOSIS — F33.1 MAJOR DEPRESSIVE DISORDER, RECURRENT EPISODE, MODERATE DEGREE: ICD-10-CM

## 2024-05-23 DIAGNOSIS — E78.2 MIXED HYPERLIPIDEMIA: ICD-10-CM

## 2024-05-23 PROBLEM — J02.9 SORE THROAT: Status: RESOLVED | Noted: 2023-10-16 | Resolved: 2024-05-23

## 2024-05-23 LAB
ALBUMIN SERPL-MCNC: 5 G/DL (ref 3.5–5.2)
ALBUMIN UR-MCNC: 30.8 MG/DL
ALBUMIN/GLOB SERPL: 1.9 G/DL
ALP SERPL-CCNC: 120 U/L (ref 39–117)
ALT SERPL W P-5'-P-CCNC: 40 U/L (ref 1–33)
ANION GAP SERPL CALCULATED.3IONS-SCNC: 13 MMOL/L (ref 5–15)
AST SERPL-CCNC: 34 U/L (ref 1–32)
BASOPHILS # BLD AUTO: 0.07 10*3/MM3 (ref 0–0.2)
BASOPHILS NFR BLD AUTO: 1.1 % (ref 0–1.5)
BILIRUB SERPL-MCNC: 0.2 MG/DL (ref 0–1.2)
BUN SERPL-MCNC: 12 MG/DL (ref 6–20)
BUN/CREAT SERPL: 20 (ref 7–25)
CALCIUM SPEC-SCNC: 9.8 MG/DL (ref 8.6–10.5)
CHLORIDE SERPL-SCNC: 96 MMOL/L (ref 98–107)
CHOLEST SERPL-MCNC: 288 MG/DL (ref 0–200)
CO2 SERPL-SCNC: 28 MMOL/L (ref 22–29)
CREAT SERPL-MCNC: 0.6 MG/DL (ref 0.57–1)
DEPRECATED RDW RBC AUTO: 39.7 FL (ref 37–54)
EGFRCR SERPLBLD CKD-EPI 2021: 115.8 ML/MIN/1.73
EOSINOPHIL # BLD AUTO: 0.12 10*3/MM3 (ref 0–0.4)
EOSINOPHIL NFR BLD AUTO: 1.9 % (ref 0.3–6.2)
ERYTHROCYTE [DISTWIDTH] IN BLOOD BY AUTOMATED COUNT: 12.2 % (ref 12.3–15.4)
GLOBULIN UR ELPH-MCNC: 2.7 GM/DL
GLUCOSE SERPL-MCNC: 368 MG/DL (ref 65–99)
HBA1C MFR BLD: 12.3 % (ref 4.8–5.6)
HCT VFR BLD AUTO: 45.4 % (ref 34–46.6)
HDLC SERPL-MCNC: 44 MG/DL (ref 40–60)
HGB BLD-MCNC: 15.3 G/DL (ref 12–15.9)
IMM GRANULOCYTES # BLD AUTO: 0.01 10*3/MM3 (ref 0–0.05)
IMM GRANULOCYTES NFR BLD AUTO: 0.2 % (ref 0–0.5)
LDLC SERPL CALC-MCNC: 153 MG/DL (ref 0–100)
LDLC/HDLC SERPL: 3.38 {RATIO}
LYMPHOCYTES # BLD AUTO: 2.34 10*3/MM3 (ref 0.7–3.1)
LYMPHOCYTES NFR BLD AUTO: 36.3 % (ref 19.6–45.3)
MCH RBC QN AUTO: 30.2 PG (ref 26.6–33)
MCHC RBC AUTO-ENTMCNC: 33.7 G/DL (ref 31.5–35.7)
MCV RBC AUTO: 89.5 FL (ref 79–97)
MONOCYTES # BLD AUTO: 0.46 10*3/MM3 (ref 0.1–0.9)
MONOCYTES NFR BLD AUTO: 7.1 % (ref 5–12)
NEUTROPHILS NFR BLD AUTO: 3.44 10*3/MM3 (ref 1.7–7)
NEUTROPHILS NFR BLD AUTO: 53.4 % (ref 42.7–76)
NRBC BLD AUTO-RTO: 0 /100 WBC (ref 0–0.2)
PLATELET # BLD AUTO: 262 10*3/MM3 (ref 140–450)
PMV BLD AUTO: 12 FL (ref 6–12)
POTASSIUM SERPL-SCNC: 4.6 MMOL/L (ref 3.5–5.2)
PROT SERPL-MCNC: 7.7 G/DL (ref 6–8.5)
RBC # BLD AUTO: 5.07 10*6/MM3 (ref 3.77–5.28)
SODIUM SERPL-SCNC: 137 MMOL/L (ref 136–145)
TRIGL SERPL-MCNC: 477 MG/DL (ref 0–150)
TSH SERPL DL<=0.05 MIU/L-ACNC: 1.86 UIU/ML (ref 0.27–4.2)
VLDLC SERPL-MCNC: 91 MG/DL (ref 5–40)
WBC NRBC COR # BLD AUTO: 6.44 10*3/MM3 (ref 3.4–10.8)

## 2024-05-23 PROCEDURE — 80061 LIPID PANEL: CPT | Performed by: PHYSICIAN ASSISTANT

## 2024-05-23 PROCEDURE — 1160F RVW MEDS BY RX/DR IN RCRD: CPT | Performed by: PHYSICIAN ASSISTANT

## 2024-05-23 PROCEDURE — 1125F AMNT PAIN NOTED PAIN PRSNT: CPT | Performed by: PHYSICIAN ASSISTANT

## 2024-05-23 PROCEDURE — 82043 UR ALBUMIN QUANTITATIVE: CPT | Performed by: PHYSICIAN ASSISTANT

## 2024-05-23 PROCEDURE — 83036 HEMOGLOBIN GLYCOSYLATED A1C: CPT | Performed by: PHYSICIAN ASSISTANT

## 2024-05-23 PROCEDURE — 3046F HEMOGLOBIN A1C LEVEL >9.0%: CPT | Performed by: PHYSICIAN ASSISTANT

## 2024-05-23 PROCEDURE — 1159F MED LIST DOCD IN RCRD: CPT | Performed by: PHYSICIAN ASSISTANT

## 2024-05-23 PROCEDURE — 99214 OFFICE O/P EST MOD 30 MIN: CPT | Performed by: PHYSICIAN ASSISTANT

## 2024-05-23 PROCEDURE — 80050 GENERAL HEALTH PANEL: CPT | Performed by: PHYSICIAN ASSISTANT

## 2024-05-23 RX ORDER — EMPAGLIFLOZIN, METFORMIN HYDROCHLORIDE 12.5; 1 MG/1; MG/1
2 TABLET, EXTENDED RELEASE ORAL DAILY
Qty: 60 TABLET | Refills: 3 | Status: SHIPPED | OUTPATIENT
Start: 2024-05-23

## 2024-05-23 NOTE — ASSESSMENT & PLAN NOTE
Discussed importance of medication compliance and not starting/stopping/changing medicines at home. Will increase insulin to 45 units at night and restart synjardy. Discussed additional medicine adjustment at f/u in 2 wks depending on bg log. Pt understands and agrees

## 2024-05-23 NOTE — PROGRESS NOTES
Chief Complaint  Diabetes, Hypothyroidism, Hyperlipidemia, and Anxiety    Subjective          Sharmaine Torres presents to [facility]  History of Present Illness  Pt here for follow up   She states she has been extremely emotional since starting Lupantea monthly injections  She states she is crying uncontrollably   She states she is now obsessed with turtles and feeling very down   She is watching sad videos  She has been very anxious  Denies si/hi   She states she has not started taking Aygestin yet as rx by gyn    Dm: pt states she has been using 40 units of Levemir nightly   States she has not skipped injections  She never started Synjardy, she picked it up but was nervous about the metformin part  Pt has been getting bg 400s at night  Denies low bg, chest pain, dizziness    Past Medical History:   Diagnosis Date    Abnormal Pap smear of cervix     precancerous cells pt had hyst for this issue    Anxiety     Depression     Diabetes mellitus     Endometriosis     Foot ulcer     Hyperlipidemia     Hypothyroidism     Obesity         Past Surgical History:   Procedure Laterality Date    APPENDECTOMY      CERVICAL CONE BIOPSY      several according to pt     SECTION      CHOLECYSTECTOMY      HAND SURGERY      HYSTERECTOMY      TUBAL ABDOMINAL LIGATION          Current Outpatient Medications on File Prior to Visit   Medication Sig Dispense Refill    BD Pen Needle Nunu 2nd Gen 32G X 4 MM misc See Admin Instructions.      BD Pen Needle Nunu 2nd Gen 32G X 4 MM misc USE AS DIRECTED 100 each 0    Blood Glucose Monitoring Suppl w/Device kit Dispense 1 kit with monitor to check blood sugar once daily. Diagnosis: DM E11.65 1 each 0    busPIRone (BUSPAR) 10 MG tablet Take 1 tablet by mouth Every Night. 180 tablet 1    cetirizine (zyrTEC) 10 MG tablet Take 1 tablet by mouth Daily. 30 tablet 3    clotrimazole (LOTRIMIN) 1 % cream Apply 1 Application  topically to the appropriate area as directed 2 (Two) Times a Day. 45 g 0    clotrimazole-betamethasone (LOTRISONE) 1-0.05 % cream Apply 1 Application topically to the appropriate area as directed 2 (Two) Times a Day. Apply to affected area twice daily 45 g 2    glucose blood test strip Check blood sugar once daily in the morning 100 each 12    insulin detemir (LEVEMIR) 100 UNIT/ML injection Inject 35 Units under the skin into the appropriate area as directed Daily. 30 mL 0    Lancets (freestyle) lancets Check blood sugar once daily in the morning 100 each 12    leuprolide (Lupron Depot, 1-Month,) 3.75 MG injection Inject 3.75 mg into the appropriate muscle as directed by prescriber Every 28 (Twenty-Eight) Days.      lisinopril (Zestril) 2.5 MG tablet Take 1 tablet by mouth Daily. 90 tablet 1    meloxicam (MOBIC) 15 MG tablet Take 1 tablet by mouth Daily. 30 tablet 3    ondansetron ODT (ZOFRAN-ODT) 4 MG disintegrating tablet Place 1 tablet on the tongue Every 8 (Eight) Hours As Needed for Nausea or Vomiting. 6 tablet 0    atorvastatin (LIPITOR) 40 MG tablet Take 1 tablet by mouth Daily. (Patient not taking: Reported on 5/23/2024) 90 tablet 1    norethindrone (Aygestin) 5 MG tablet Take 1 tablet by mouth Daily. (Patient not taking: Reported on 5/23/2024) 30 tablet 6    [DISCONTINUED] cephalexin (KEFLEX) 500 MG capsule Take 1 capsule by mouth 3 (Three) Times a Day. (Patient not taking: Reported on 3/4/2024) 21 capsule 0    [DISCONTINUED] fluconazole (Diflucan) 150 MG tablet Take 1 tablet by mouth every other day (Patient not taking: Reported on 3/4/2024) 3 tablet 0    [DISCONTINUED] Synjardy XR 12.5-1000 MG tablet sustained-release 24 hour Take 2 tablets by mouth Daily. (Patient not taking: Reported on 5/23/2024) 60 tablet 3     Current Facility-Administered Medications on File Prior to Visit   Medication Dose Route Frequency Provider Last Rate Last Admin    leuprolide (LUPRON) injection 3.75 mg  3.75 mg Intramuscular  "Q30 Days Abbe Kowalski MD   3.75 mg at 05/01/24 1503        Allergies   Allergen Reactions    Codeine Nausea And Vomiting     Pt stated tylenol codeine       Social History     Tobacco Use   Smoking Status Never   Smokeless Tobacco Never          Objective   Vital Signs:   /88 (BP Location: Left arm, Patient Position: Sitting, Cuff Size: Adult)   Pulse 88   Temp 97.8 °F (36.6 °C) (Temporal)   Resp 16   Ht 175.3 cm (69.02\")   Wt 92.5 kg (204 lb)   SpO2 96%   BMI 30.11 kg/m²     Physical Exam  Vitals reviewed.   Constitutional:       Appearance: Normal appearance.   HENT:      Head: Normocephalic and atraumatic.      Nose: Nose normal.      Mouth/Throat:      Mouth: Mucous membranes are moist.   Eyes:      Extraocular Movements: Extraocular movements intact.      Conjunctiva/sclera: Conjunctivae normal.      Pupils: Pupils are equal, round, and reactive to light.   Cardiovascular:      Rate and Rhythm: Normal rate and regular rhythm.   Pulmonary:      Effort: Pulmonary effort is normal.      Breath sounds: Normal breath sounds.   Abdominal:      General: Abdomen is flat. Bowel sounds are normal.      Palpations: Abdomen is soft.   Musculoskeletal:         General: Normal range of motion.   Neurological:      General: No focal deficit present.      Mental Status: She is alert and oriented to person, place, and time.   Psychiatric:         Mood and Affect: Mood normal.        Result Review :                            Assessment and Plan    Diagnoses and all orders for this visit:    1. Type 2 diabetes mellitus with hyperglycemia, without long-term current use of insulin (Primary)  Assessment & Plan:  Discussed importance of medication compliance and not starting/stopping/changing medicines at home. Will increase insulin to 45 units at night and restart synjardy. Discussed additional medicine adjustment at f/u in 2 wks depending on bg log. Pt understands and agrees    Orders:  -     Comprehensive " Metabolic Panel  -     CBC & Differential  -     Hemoglobin A1c  -     MicroAlbumin, Urine, Random - Urine, Random Void    2. Hypothyroidism, unspecified type  Assessment & Plan:  Labs today, will adjust dose if indicated based on results    Orders:  -     TSH    3. Mixed hyperlipidemia  -     Lipid Panel    4. Generalized anxiety disorder  Assessment & Plan:  Anxiety not well controlled. Discussed tx options. Will continue buspar. Will start Aygestin from GYN. Discussed addition of SSRI or other medicine at 2 wk f/u if no improvement of sx. To er if si/hi. Pt understands and agrees.      5. Noncompliance    6. Type 2 diabetes mellitus with hyperglycemia, with long-term current use of insulin  Assessment & Plan:  Discussed importance of medication compliance and not starting/stopping/changing medicines at home. Will increase insulin to 45 units at night and restart synjardy. Discussed additional medicine adjustment at f/u in 2 wks depending on bg log. Pt understands and agrees      7. Major depressive disorder, recurrent episode, moderate degree    8. Chronic pelvic pain in female  Assessment & Plan:  Reviewed recent GYN note. Encouraged medication compliance with plan. Pt will start meds as rx.      Other orders  -     Synjardy XR 12.5-1000 MG tablet sustained-release 24 hour; Take 2 tablets by mouth Daily.  Dispense: 60 tablet; Refill: 3        Follow Up   Return in about 2 weeks (around 6/6/2024).  Patient was given instructions and counseling regarding her condition or for health maintenance advice. Please see specific information pulled into the AVS if appropriate.

## 2024-05-23 NOTE — ASSESSMENT & PLAN NOTE
Anxiety not well controlled. Discussed tx options. Will continue buspar. Will start Aygestin from GYN. Discussed addition of SSRI or other medicine at 2 wk f/u if no improvement of sx. To er if si/hi. Pt understands and agrees.

## 2024-05-24 ENCOUNTER — REFERRAL TRIAGE (OUTPATIENT)
Dept: CASE MANAGEMENT | Facility: OTHER | Age: 44
End: 2024-05-24
Payer: MEDICAID

## 2024-05-24 ENCOUNTER — TELEPHONE (OUTPATIENT)
Dept: INTERNAL MEDICINE | Facility: CLINIC | Age: 44
End: 2024-05-24
Payer: MEDICAID

## 2024-05-24 DIAGNOSIS — E78.2 MIXED HYPERLIPIDEMIA: ICD-10-CM

## 2024-05-24 DIAGNOSIS — E11.65 TYPE 2 DIABETES MELLITUS WITH HYPERGLYCEMIA, WITHOUT LONG-TERM CURRENT USE OF INSULIN: Primary | ICD-10-CM

## 2024-05-24 DIAGNOSIS — Z91.199 NONCOMPLIANCE: ICD-10-CM

## 2024-05-24 DIAGNOSIS — E03.9 HYPOTHYROIDISM, UNSPECIFIED TYPE: ICD-10-CM

## 2024-05-24 DIAGNOSIS — F41.1 GENERALIZED ANXIETY DISORDER: ICD-10-CM

## 2024-05-24 NOTE — TELEPHONE ENCOUNTER
Caller: Sharmaine Torres    Relationship: Self    Best call back number: [phone]     Caller requesting test results: YES    What test was performed: URINALYSIS AND BLOOD WORK    When was the test performed: 5/23/24    Where was the test performed: IN OFFICE

## 2024-05-24 NOTE — TELEPHONE ENCOUNTER
Called and spoke with pt, explained to pt results of labs, explained to pt she needs to restart her Atorvastatin per provider, explained to pt  will be reaching out to pt to help re establish treatment for sugar control. Explained to pt we will follow up with repeat labs at next apt. Pt verbalized understanding and had no further questions at this time.

## 2024-06-03 ENCOUNTER — CLINICAL SUPPORT (OUTPATIENT)
Dept: OBSTETRICS AND GYNECOLOGY | Facility: CLINIC | Age: 44
End: 2024-06-03
Payer: MEDICAID

## 2024-06-03 DIAGNOSIS — R10.2 PELVIC PAIN: ICD-10-CM

## 2024-06-03 DIAGNOSIS — N80.9 ENDOMETRIOSIS: Primary | ICD-10-CM

## 2024-06-03 PROCEDURE — 96372 THER/PROPH/DIAG INJ SC/IM: CPT | Performed by: OBSTETRICS & GYNECOLOGY

## 2024-06-03 NOTE — PROGRESS NOTES
Patient received Depo Lupron today  Administered in Left  Ventrogluteal  Next injection due between: 7/3/24  Beta HCG: was not done  Urine HCG:was not done  [place] in the last two weeks(NA if pt has been receiving depo):not applicable

## 2024-06-04 ENCOUNTER — PATIENT OUTREACH (OUTPATIENT)
Dept: CASE MANAGEMENT | Facility: OTHER | Age: 44
End: 2024-06-04
Payer: MEDICAID

## 2024-06-04 DIAGNOSIS — R03.0 ELEVATED BLOOD PRESSURE READING: ICD-10-CM

## 2024-06-04 DIAGNOSIS — E11.65 TYPE 2 DIABETES MELLITUS WITH HYPERGLYCEMIA, WITHOUT LONG-TERM CURRENT USE OF INSULIN: Primary | ICD-10-CM

## 2024-06-04 NOTE — OUTREACH NOTE
AMBULATORY CASE MANAGEMENT NOTE    Names and Relationships of Patient/Support Persons: Caller: Brian Sharmaine SACHA; Relationship: Self -     CCM Interim Update    Called and spoke with patient to discuss CCM program. Patient gives verbal informed consent for CCM program. She is aware that with the program we will set goals and care plans to target lowering her A1C and taking medications as prescribed. She is aware that there may be a copay with the program and that she can withdrawal from the program at any time.    Patient states she is having a difficult time taking the synjardy the way it is prescribed (2pills daily) due to the side effect of nausea so she has either been taking 1 pill or taking it at night then switching her Levemir to the morning. I stressed to the patient how important it is to take all of her medications as prescribed. She has agreed to take them as prescribed, faithfully, until her next appointment with her PCP (6/27).    Patient has not been taking Aygestin, as prescribed by her GYN. I provided medication education verbally to her explaining the reason it is important to take this medication. I also encouraged her to call her GYN's office with any further questions/concerns regarding their plan of care for her. Patient verbalized understanding.    Patient is not currently taking lisinipril or lipitor. She needs a refill on lipitor. I explained the importance of these medications and how they tie into her overall health and well being.    Patient states she does not check blood glucose often. Patient and I discussed the importance of knowing what her blood glucose is throughout the day in order to better manage it. I have asked the patient to check her BG 4 times a day. I explained in detail what this means. Patient states she needs a refill on her test strips. She has also inquired about a CGM.    This patient and I spoke at length about medication management and compliance. I spoke with her  about her A1C being 12.3 presently and the importance of lowering her daily blood glucose numbers in order to lower her A1C. Patient verbalized understanding. I also spoke with the patient about the importance of trusting her healthcare providers and the plan of care they establish for her greater health. We discussed how when she starts to feel better physically, she will most likely feel better emotionally. Patient has agreed to take all medications prescribed and in the manner in which they have been prescribed.    Patient's medication list reviewed in detail with patient.    Patient given my direct line number and communication trever established with patient.    Care Coordination    I electronically communicated to PCP that patient needs refills on Lipitor and test strips sent to her preferred pharmacy.    I placed a referral to the [facility] at [facility] to discuss a CGM but also for more in depth diabetic education.    Adult Patient Profile  Questions/Answers      Flowsheet Row Most Recent Value   Symptoms/Conditions Managed at Home cardiovascular, diabetes, type 2, other (see comments), chronic pain  [gynecological]   Barriers to Managing Health stress of chronic illness, understanding health advice   Cardiovascular Symptoms/Conditions high blood cholesterol, hypertension   Cardiovascular Management Strategies routine screening, medication therapy   Cardiovascular Self-Management Outcome 1 (very bad)   Diabetes Management Strategies blood glucose testing, insulin therapy, medication therapy, routine screenings   Frequency of Blood Glucose Testing other (see comments)  [rarely]   A1C Target less than 7%   Usual Blood Glucose 300-400   Last A1C Result 12.3%   Injection Schedule Levemir 45 units at night   Insulin Pump Type needle   Diabetes Self-Management Outcome 1 (very bad)   Diabetes Comment A1C has gone up. Patient not currently checking BG   Identifying Health Goals managing weight, making sure  to get medicine, managing stress, anxiety or depression   Taking Medications Not Prescribed no   Missed Doses of Prescribed Medications During Past Week yes  [patient not currently taking Aygestin, lisinipril, atorvastatin]   Taken Prescribed Medications at Different Time or Schedule During Past Week yes  [pt has taken Levemir in the mornings and synjardy at night]   Taken More or Less Medication Than Prescribed yes  [pt has taken 1 pill of synjardy instead of 2]   Barriers to Taking Medication as Prescribed medication side effects, don't think I need it, don't know what it is for   Current Living Arrangements home          SDOH updated and reviewed with the patient during this program:  Financial Resource Strain: Low Risk  (6/4/2024)    Overall Financial Resource Strain (CARDIA)     Difficulty of Paying Living Expenses: Not very hard      --     Food Insecurity: No Food Insecurity (6/4/2024)    Hunger Vital Sign     Worried About Running Out of Food in the Last Year: Never true     Ran Out of Food in the Last Year: Never true      --     Housing Stability: Unknown (6/4/2024)    Housing Stability Vital Sign     Unable to Pay for Housing in the Last Year: No     Homeless in the Last Year: No      --     Transportation Needs: No Transportation Needs (6/4/2024)    PRAPARE - Transportation     Lack of Transportation (Medical): No     Lack of Transportation (Non-Medical): No       Education Documentation  Oral Medication, taught by Magdalena Casiano, RN at 6/4/2024 11:35 AM.  Learner: Patient  Readiness: Acceptance  Method: Explanation  Response: Verbalizes Understanding, Needs Reinforcement    Insulin Therapy, taught by Magdalena Casiano RN at 6/4/2024 11:35 AM.  Learner: Patient  Readiness: Acceptance  Method: Explanation  Response: Verbalizes Understanding, Needs Reinforcement    Importance of Glycemic Management, taught by Magdalena Casiano, RN at 6/4/2024 11:35 AM.  Learner: Patient  Readiness: Acceptance  Method:  Explanation  Response: Verbalizes Understanding, Needs Reinforcement    Blood Glucose Monitor Use, taught by Magdalena Casiano RN at 6/4/2024 11:35 AM.  Learner: Patient  Readiness: Acceptance  Method: Explanation  Response: Verbalizes Understanding, Needs Reinforcement    Blood Glucose Monitoring, taught by Magdalena Casiano RN at 6/4/2024 11:35 AM.  Learner: Patient  Readiness: Acceptance  Method: Explanation  Response: Verbalizes Understanding, Needs Reinforcement    Blood Glucose Goal, taught by Magdalean Casiano RN at 6/4/2024 11:35 AM.  Learner: Patient  Readiness: Acceptance  Method: Explanation  Response: Verbalizes Understanding, Needs Reinforcement    Medication Management, taught by Magdalena Casiano RN at 6/4/2024 11:35 AM.  Learner: Patient  Readiness: Acceptance  Method: Explanation  Response: Verbalizes Understanding, Needs Reinforcement    Frequency of A1C Testing, taught by Magdalena Casiano RN at 6/4/2024 11:35 AM.  Learner: Patient  Readiness: Acceptance  Method: Explanation  Response: Verbalizes Understanding, Needs Reinforcement    Blood Glucose Monitoring, taught by Magdalena Casiano RN at 6/4/2024 11:35 AM.  Learner: Patient  Readiness: Acceptance  Method: Explanation  Response: Verbalizes Understanding, Needs Reinforcement    Diabetes Type 2, taught by Magdalena Casiano RN at 6/4/2024 11:35 AM.  Learner: Patient  Readiness: Acceptance  Method: Explanation  Response: Verbalizes Understanding, Needs Reinforcement    A1C Definition, taught by Magdalena Casiano RN at 6/4/2024 11:35 AM.  Learner: Patient  Readiness: Acceptance  Method: Explanation  Response: Verbalizes Understanding, Needs Reinforcement          Magdalena PETERS  Ambulatory Case Management    6/4/2024, 11:35 EDT

## 2024-06-05 DIAGNOSIS — E11.65 TYPE 2 DIABETES MELLITUS WITH HYPERGLYCEMIA, WITHOUT LONG-TERM CURRENT USE OF INSULIN: Primary | ICD-10-CM

## 2024-06-18 ENCOUNTER — TELEPHONE (OUTPATIENT)
Dept: CASE MANAGEMENT | Facility: OTHER | Age: 44
End: 2024-06-18
Payer: MEDICAID

## 2024-06-19 ENCOUNTER — TELEPHONE (OUTPATIENT)
Dept: CASE MANAGEMENT | Facility: OTHER | Age: 44
End: 2024-06-19
Payer: MEDICAID

## 2024-06-21 ENCOUNTER — TELEPHONE (OUTPATIENT)
Dept: CASE MANAGEMENT | Facility: OTHER | Age: 44
End: 2024-06-21
Payer: MEDICAID

## 2024-06-22 DIAGNOSIS — E11.65 TYPE 2 DIABETES MELLITUS WITH HYPERGLYCEMIA, WITHOUT LONG-TERM CURRENT USE OF INSULIN: ICD-10-CM

## 2024-06-24 RX ORDER — BUSPIRONE HYDROCHLORIDE 10 MG/1
10 TABLET ORAL NIGHTLY
Qty: 180 TABLET | Refills: 0 | Status: SHIPPED | OUTPATIENT
Start: 2024-06-24 | End: 2024-06-27 | Stop reason: SDUPTHER

## 2024-06-24 RX ORDER — INSULIN DETEMIR 100 [IU]/ML
INJECTION, SOLUTION SUBCUTANEOUS
Qty: 30 ML | Refills: 0 | Status: SHIPPED | OUTPATIENT
Start: 2024-06-24 | End: 2024-06-27 | Stop reason: SDUPTHER

## 2024-06-25 ENCOUNTER — TELEPHONE (OUTPATIENT)
Dept: OBSTETRICS AND GYNECOLOGY | Facility: CLINIC | Age: 44
End: 2024-06-25
Payer: MEDICAID

## 2024-06-25 NOTE — TELEPHONE ENCOUNTER
Tried to call the patient to discuss her concerns/questions. Someone answered and then the call dropped.

## 2024-06-25 NOTE — TELEPHONE ENCOUNTER
Caller: Sharmaine Torres    Relationship to patient: Self    Best call back number: [phone]    Chief complaint: PATIENT STATES THAT SHE IS STARTING TO HAVE PAIN AGAIN AND IS WANTING TO KNOW IF IT IS TIME FOR HER NEXT DEPO SHOT.    Type of visit: DEPO    Requested date: ANY     Additional notes:PATIENT WOULD LIKE A CALL BACK.

## 2024-06-26 DIAGNOSIS — E11.65 TYPE 2 DIABETES MELLITUS WITH HYPERGLYCEMIA, WITHOUT LONG-TERM CURRENT USE OF INSULIN: ICD-10-CM

## 2024-06-26 RX ORDER — BUSPIRONE HYDROCHLORIDE 10 MG/1
10 TABLET ORAL NIGHTLY
Qty: 180 TABLET | Refills: 0 | OUTPATIENT
Start: 2024-06-26

## 2024-06-26 NOTE — TELEPHONE ENCOUNTER
Caller: Sharmaine Torres    Relationship: Self    Best call back number: [phone]    Requested Prescriptions:   Requested Prescriptions     Pending Prescriptions Disp Refills    busPIRone (BUSPAR) 10 MG tablet 180 tablet 0     Sig: Take 1 tablet by mouth Every Night.        Pharmacy where request should be sent: [address] - [phone]  - [phone]     Last office visit with prescribing clinician: 5/23/2024   Last telemedicine visit with prescribing clinician: Visit date not found   Next office visit with prescribing clinician: 6/27/2024       Does the patient have less than a 3 day supply:  [x] Yes  [] No    Would you like a call back once the refill request has been completed: [] Yes [] No    If the office needs to give you a call back, can they leave a voicemail: [] Yes [] No    Anastasiia Bruno Rep   06/26/24 12:36 EDT

## 2024-06-27 ENCOUNTER — PATIENT OUTREACH (OUTPATIENT)
Dept: CASE MANAGEMENT | Facility: OTHER | Age: 44
End: 2024-06-27
Payer: MEDICAID

## 2024-06-27 ENCOUNTER — OFFICE VISIT (OUTPATIENT)
Dept: INTERNAL MEDICINE | Facility: CLINIC | Age: 44
End: 2024-06-27
Payer: MEDICAID

## 2024-06-27 ENCOUNTER — TELEPHONE (OUTPATIENT)
Dept: INTERNAL MEDICINE | Facility: CLINIC | Age: 44
End: 2024-06-27

## 2024-06-27 VITALS
TEMPERATURE: 97.3 F | OXYGEN SATURATION: 96 % | WEIGHT: 202 LBS | RESPIRATION RATE: 15 BRPM | HEART RATE: 99 BPM | SYSTOLIC BLOOD PRESSURE: 122 MMHG | HEIGHT: 69 IN | DIASTOLIC BLOOD PRESSURE: 80 MMHG | BODY MASS INDEX: 29.92 KG/M2

## 2024-06-27 DIAGNOSIS — F41.1 GENERALIZED ANXIETY DISORDER: Primary | ICD-10-CM

## 2024-06-27 DIAGNOSIS — E11.65 TYPE 2 DIABETES MELLITUS WITH HYPERGLYCEMIA, WITHOUT LONG-TERM CURRENT USE OF INSULIN: ICD-10-CM

## 2024-06-27 DIAGNOSIS — R06.83 SNORES: ICD-10-CM

## 2024-06-27 DIAGNOSIS — R03.0 ELEVATED BLOOD PRESSURE READING: ICD-10-CM

## 2024-06-27 DIAGNOSIS — E11.65 TYPE 2 DIABETES MELLITUS WITH HYPERGLYCEMIA, WITHOUT LONG-TERM CURRENT USE OF INSULIN: Primary | ICD-10-CM

## 2024-06-27 PROCEDURE — 1125F AMNT PAIN NOTED PAIN PRSNT: CPT | Performed by: PHYSICIAN ASSISTANT

## 2024-06-27 PROCEDURE — 1159F MED LIST DOCD IN RCRD: CPT | Performed by: PHYSICIAN ASSISTANT

## 2024-06-27 PROCEDURE — 1160F RVW MEDS BY RX/DR IN RCRD: CPT | Performed by: PHYSICIAN ASSISTANT

## 2024-06-27 PROCEDURE — 99214 OFFICE O/P EST MOD 30 MIN: CPT | Performed by: PHYSICIAN ASSISTANT

## 2024-06-27 PROCEDURE — 3046F HEMOGLOBIN A1C LEVEL >9.0%: CPT | Performed by: PHYSICIAN ASSISTANT

## 2024-06-27 RX ORDER — ATORVASTATIN CALCIUM 40 MG/1
40 TABLET, FILM COATED ORAL DAILY
Qty: 90 TABLET | Refills: 1 | Status: SHIPPED | OUTPATIENT
Start: 2024-06-27

## 2024-06-27 RX ORDER — EMPAGLIFLOZIN, METFORMIN HYDROCHLORIDE 12.5; 1 MG/1; MG/1
2 TABLET, EXTENDED RELEASE ORAL DAILY
Qty: 60 TABLET | Refills: 5 | Status: SHIPPED | OUTPATIENT
Start: 2024-06-27

## 2024-06-27 RX ORDER — INSULIN DETEMIR 100 [IU]/ML
45 INJECTION, SOLUTION SUBCUTANEOUS DAILY
Qty: 30 ML | Refills: 2 | Status: SHIPPED | OUTPATIENT
Start: 2024-06-27

## 2024-06-27 RX ORDER — LISINOPRIL 2.5 MG/1
2.5 TABLET ORAL DAILY
Qty: 90 TABLET | Refills: 1 | Status: SHIPPED | OUTPATIENT
Start: 2024-06-27

## 2024-06-27 RX ORDER — BUSPIRONE HYDROCHLORIDE 10 MG/1
10 TABLET ORAL 3 TIMES DAILY PRN
Qty: 180 TABLET | Refills: 1 | Status: SHIPPED | OUTPATIENT
Start: 2024-06-27

## 2024-06-27 NOTE — OUTREACH NOTE
[facility] End of Month Documentation    This Chronic Medical Management Care Plan for Sharmaine Torres, 43 y.o. female, has been a new plan of care implemented; established and a new plan of care implemented for the month of June.  A cumulative time of 91  minutes was spent on this patient record this month, including phone call with patient; electronic communication with primary care provider; chart review.    Regarding the patient's problems: has Hypothyroidism; Type 2 diabetes mellitus with complication; Mixed hyperlipidemia; Generalized anxiety disorder; Type 2 diabetes mellitus with hyperglycemia, with long-term current use of insulin; Generalized abdominal pain; Diarrhea; HGSIL on Pap smear of cervix; Rectocele; Endometriosis; Chronic pelvic pain in female; Endometrioma; Menopausal symptoms; and Pelvic pain on their problem list., the following items were addressed: medical records; medications; referrals to community service providers, referral to Diabetic Clinic and any changes can be found within the plan section of the note.  A detailed listing of time spent for chronic care management is tracked within each outreach encounter.  Current medications include:  has a current medication list which includes the following prescription(s): atorvastatin, bd pen needle feliberto 2nd gen, bd pen needle feliberto 2nd gen, blood glucose monitoring suppl, buspirone, cetirizine, clotrimazole, clotrimazole-betamethasone, glucose blood, freestyle, lupron depot (1-month), levemir flexpen, lisinopril, meloxicam, norethindrone, ondansetron odt, and synjardy xr, and the following Facility-Administered Medications: leuprolide. and the patient is reported to be patient is noncompliant with medication protocol,  Medications are reported to be non-effective in controlling symptoms and changes have been made to the medication protocol.  Regarding these diagnoses, referrals were made to the following provider(s):  N/A.  All notes on chart for PCP  to review.    The patient was monitored remotely for mood & behavior; pain; medications.    The patient's physical needs include:  medication education; physical healthcare.     The patient's mental support needs include:  continued support    The patient's cognitive support needs include:  medication; health care; increased support    The patient's psychosocial support needs include:  medication management or adherence    The patient's functional needs include: medication education; physical healthcare    The patient's environmental needs include:  not applicable    Care Plan overall comments:  Established    Refer to previous outreach notes for more information on the areas listed above.    Monthly Billing Diagnoses  (E11.65) Type 2 diabetes mellitus with hyperglycemia, without long-term current use of insulin    (R03.0) Elevated blood pressure reading    Medications   Medications have been reconciled    Care Plan progress this month:      Recently Modified Care Plans Updates made since 5/27/2024 12:00 AM       Wellness (Adult)           Problem Priority Last Modified     Medication Adherence (Wellness) --  6/4/2024 11:15 AM by Magdalena Casiano RN              Goal Recent Progress Last Modified     Medication Adherence Maintained --  6/4/2024 11:15 AM by Magdalena Casiano RN     Evidence-based guidance:   Develop a complete and accurate medication list including those prescribed and over-the-counter, those taken only occasionally and those not taken by mouth such as injections, inhalers, ointments or creams and drops.   Review all medications to determine if patient or caregiver knows why the medications are given and if taken as prescribed.   Complete or review a medication adherence assessment including barriers to medication adherence.   Arrange and encourage counseling and medication review by pharmacist.   Assess barriers to medication adherence.   Manage poor understanding or health literacy by using easy to  understand language, teach-back, visual aids and teaching only 2 or 3 points at a time.   Assess presence of side effects; provide suggestions to manage or reduce side effects.   Consult with provider and/or pharmacist regarding substitute medication, changing dose, simplification of regimen or safe discontinuation of some medications.   Encourage the use of medication reminders such as clock or cell phone alarm, color coding, pillboxes for am/pm and days of the week, pharmacy refill reminder, auto-refill system or mail-order option.   Assist with resources when cost is a barrier; refer to prescription assistance programs; confirm that generics are prescribed whenever possible; consider 90-day prescriptions to reduce copay cost; synchronize refills.   Provide help to complete medication assistance applications or health insurance forms as needed.   Complete a follow-up call 2 to 3 weeks after medication self-management plan developed; assess adherence and understanding, as well as listen to patient or caregiver concerns; amend plan as needed.   Provide frequent follow-up providing motivation, encouragement and support when medication nonadherence is identified.    Notes:            Task Due Date Last Modified     Optimize Medication Use --  6/4/2024 11:15 AM by Magdalena Casiano RN     Care Management Activities:      - medication list compiled  - medication list reviewed  - medication-adherence assessment completed      Notes:                   Diabetes Type 2 (Adult)           Problem Priority Last Modified     Glycemic Management (Diabetes, Type 2) --  6/4/2024 11:15 AM by Magdalena Casiano RN              Goal Recent Progress Last Modified     Glycemic Management Optimized --  6/4/2024 11:15 AM by Magdalena Casiano RN     Evidence-based guidance:   Anticipate A1C testing (point-of-care) every 3 to 6 months based on goal attainment.   Review mutually-set A1C goal or target range.   Anticipate use of  antihyperglycemic with or without insulin and periodic adjustments; consider active involvement of pharmacist.   Provide medical nutrition therapy and development of individualized eating.   Compare self-reported symptoms of hypo or hyperglycemia to blood glucose levels, diet and fluid intake, current medications, psychosocial and physiologic stressors, change in activity and barriers to care adherence.   Promote self-monitoring of blood glucose levels.   Assess and address barriers to management plan, such as food insecurity, age, developmental ability, depression, anxiety, fear of hypoglycemia or weight gain, as well as medication cost, side effects and complicated regimen.   Consider referral to community-based diabetes education program, visiting nurse, community health worker or health .   Encourage regular dental care for treatment of periodontal disease; refer to dental provider when needed.    Notes:            Task Due Date Last Modified     Alleviate Barriers to Glycemic Management --  6/4/2024 11:15 AM by Magdalena Casiano RN     Care Management Activities:      - A1C testing facilitated  - mutual A1C goal set or reviewed      Notes:              Problem Priority Last Modified     Disease Progression (Diabetes, Type 2) --  6/4/2024 11:15 AM by Magdalena Casiano RN              Goal Recent Progress Last Modified     Disease Progression Prevented or Minimized --  6/4/2024 11:15 AM by Magdalena Casiano RN     Evidence-based guidance:   Prepare patient for laboratory and diagnostic exams based on risk and presentation.   Encourage lifestyle changes, such as increased intake of plant-based foods, stress reduction, consistent physical activity and smoking cessation to prevent long-term complications and chronic disease.    Individualize activity and exercise recommendations while considering potential limitations, such as neuropathy, retinopathy or the ability to prevent hyperglycemia or hypoglycemia.     Prepare patient for use of pharmacologic therapy that may include antihypertensive, analgesic, prostaglandin E1 with periodic adjustments, based on presenting chronic condition and laboratory results.   Assess signs/symptoms and risk factors for hypertension, sleep-disordered breathing, neuropathy (including changes in gait and balance), retinopathy, nephropathy and sexual dysfunction.   Address pregnancy planning and contraceptive choice, especially when prescribing antihypertensive or statin.   Ensure completion of annual comprehensive foot exam and dilated eye exam.    Implement additional individualized goals and interventions based on identified risk factors.   Prepare patient for consultation or referral for specialist care, such as ophthalmology, neurology, cardiology, podiatry, nephrology or perinatology.    Notes:            Task Due Date Last Modified     Monitor and Manage Follow-up for Comorbidities --  6/4/2024 11:15 AM by Magdalena Casiano RN     Care Management Activities:      - healthy lifestyle promoted      Notes:                        Current Specialty Plan of Care Status signed by both patient and provider    Instructions   Patient was provided an electronic copy of care plan  CCM services were explained and offered and patient has accepted these services.  Patient has given their written consent to receive CCM services and understands that this includes the authorization of electronic communication of medical information with the other treating providers.  Patient understands that they may stop CCM services at any time and these changes will be effective at the end of the calendar month and will effectively revocate the agreement of CCM services.  Patient understands that only one practitioner can furnish and be paid for CCM services during one calendar month.  Patient also understands that there may be co-payment or deductible fees in association with CCM services.  Patient will continue  with at least monthly follow-up calls with the Ambulatory .    Magdalena PETERS  Ambulatory Case Management    6/27/2024, 09:27 EDT

## 2024-06-27 NOTE — PROGRESS NOTES
Chief Complaint  Diabetes and Anxiety    Subjective          Sharmaine Torres presents to [facility]  History of Present Illness  PT here for follow up on DM and anxiety  Pt states she does not check her sugars when she first wakes up in the morning   She is using 45 units of insulin at night   States when she checks bg after eating it is in the 200s. When she checks 2 hr after eating, it is 140s.   Denies chest pain, palpitations  Admits to HA first thing upon awakening   Pt is not sure if she snores at night   Denies chest pain, palpitations, dizziness    Anxiety: feels anxiety has improved with use of buspar twice a day   She states if she is driving she gets anxious. Taking the buspar helps   Pt states she is not sleeping well  Goes to bed at midnight  Wakes up at 5 am   Pregnant daughter recently moved in with her, baby coming in 3 wks.   She has been cleaning her home and getting rid of things to make room  She has been babysitting 2 yr old grandchild at 5am-5pm    Past Medical History:   Diagnosis Date    Abnormal Pap smear of cervix     precancerous cells pt had hyst for this issue    Anxiety     Depression     Diabetes mellitus     Endometriosis     Foot ulcer     Hyperlipidemia     Hypothyroidism     Obesity         Past Surgical History:   Procedure Laterality Date    APPENDECTOMY  2005    CERVICAL CONE BIOPSY      several according to pt     SECTION      CHOLECYSTECTOMY      HAND SURGERY      HYSTERECTOMY      TUBAL ABDOMINAL LIGATION          Current Outpatient Medications on File Prior to Visit   Medication Sig Dispense Refill    BD Pen Needle Nunu 2nd Gen 32G X 4 MM misc See Admin Instructions.      BD Pen Needle Nunu 2nd Gen 32G X 4 MM misc USE AS DIRECTED 100 each 0    Blood Glucose Monitoring Suppl w/Device kit Dispense 1 kit with monitor to check blood sugar once daily. Diagnosis: DM E11.65 1 each 0    cetirizine  "(zyrTEC) 10 MG tablet Take 1 tablet by mouth Daily. 30 tablet 3    clotrimazole (LOTRIMIN) 1 % cream Apply 1 Application topically to the appropriate area as directed 2 (Two) Times a Day. 45 g 0    clotrimazole-betamethasone (LOTRISONE) 1-0.05 % cream Apply 1 Application topically to the appropriate area as directed 2 (Two) Times a Day. Apply to affected area twice daily 45 g 2    glucose blood test strip Check blood sugar once daily in the morning 100 each 12    Lancets (freestyle) lancets Check blood sugar once daily in the morning 100 each 12    leuprolide (Lupron Depot, 1-Month,) 3.75 MG injection Inject 3.75 mg into the appropriate muscle as directed by prescriber Every 28 (Twenty-Eight) Days.      meloxicam (MOBIC) 15 MG tablet Take 1 tablet by mouth Daily. 30 tablet 3    norethindrone (Aygestin) 5 MG tablet Take 1 tablet by mouth Daily. 30 tablet 6    ondansetron ODT (ZOFRAN-ODT) 4 MG disintegrating tablet Place 1 tablet on the tongue Every 8 (Eight) Hours As Needed for Nausea or Vomiting. 6 tablet 0     Current Facility-Administered Medications on File Prior to Visit   Medication Dose Route Frequency Provider Last Rate Last Admin    leuprolide (LUPRON) injection 3.75 mg  3.75 mg Intramuscular Q30 Days Abbe Kowalski MD   3.75 mg at 06/03/24 1519        Allergies   Allergen Reactions    Codeine Nausea And Vomiting     Pt stated tylenol codeine       Social History     Tobacco Use   Smoking Status Never   Smokeless Tobacco Never          Objective   Vital Signs:   /80 (BP Location: Right arm, Patient Position: Sitting, Cuff Size: Adult)   Pulse 99   Temp 97.3 °F (36.3 °C) (Temporal)   Resp 15   Ht 175.3 cm (69.02\")   Wt 91.6 kg (202 lb)   SpO2 96%   BMI 29.81 kg/m²     Physical Exam  Vitals reviewed.   Constitutional:       Appearance: Normal appearance.   HENT:      Head: Normocephalic and atraumatic.      Nose: Nose normal.      Mouth/Throat:      Mouth: Mucous membranes are moist. "   Eyes:      Extraocular Movements: Extraocular movements intact.      Conjunctiva/sclera: Conjunctivae normal.      Pupils: Pupils are equal, round, and reactive to light.   Cardiovascular:      Rate and Rhythm: Normal rate and regular rhythm.   Pulmonary:      Effort: Pulmonary effort is normal.      Breath sounds: Normal breath sounds.   Abdominal:      General: Abdomen is flat. Bowel sounds are normal.      Palpations: Abdomen is soft.   Musculoskeletal:         General: Normal range of motion.   Neurological:      General: No focal deficit present.      Mental Status: She is alert and oriented to person, place, and time.   Psychiatric:         Mood and Affect: Mood normal.        Result Review :   The following data was reviewed by: Adri Reeder PA-C on 06/27/2024:  Common labs          9/18/2023    13:51 1/8/2024    15:19 5/23/2024    15:31   Common Labs   Glucose 367  375  368    BUN 11  8  12    Creatinine 0.60  0.55  0.60    Sodium 133  135  137    Potassium 4.6  4.1  4.6    Chloride 97  98  96    Calcium 9.7  9.0  9.8    Albumin 4.9  4.1  5.0    Total Bilirubin 0.2  <0.2  0.2    Alkaline Phosphatase 131  107  120    AST (SGOT) 19  19  34    ALT (SGPT) 17  17  40    WBC 7.35  4.49  6.44    Hemoglobin 15.1  13.8  15.3    Hematocrit 44.7  40.9  45.4    Platelets 284  236  262    Total Cholesterol 271  250  288    Triglycerides 563  371  477    HDL Cholesterol 38  38  44    LDL Cholesterol  130  144  153    Hemoglobin A1C 10.50  11.30  12.30    Microalbumin, Urine   30.8                           Assessment and Plan    Diagnoses and all orders for this visit:    1. Generalized anxiety disorder (Primary)  Comments:  Improved, continue current medicine    2. Type 2 diabetes mellitus with hyperglycemia, without long-term current use of insulin  -     insulin detemir (Levemir FlexPen) 100 UNIT/ML injection; Inject 45 Units under the skin into the appropriate area as directed Daily.  Dispense: 30 mL; Refill:  2  -     busPIRone (BUSPAR) 10 MG tablet; Take 1 tablet by mouth 3 (Three) Times a Day As Needed (anxiety).  Dispense: 180 tablet; Refill: 1  -     lisinopril (Zestril) 2.5 MG tablet; Take 1 tablet by mouth Daily.  Dispense: 90 tablet; Refill: 1  -     atorvastatin (LIPITOR) 40 MG tablet; Take 1 tablet by mouth Daily.  Dispense: 90 tablet; Refill: 1    3. Type 2 diabetes mellitus with hyperglycemia, without long-term current use of insulin  Comments:  Sugars improved overall. Discussed checking bg fasting to determine if insulin needs to be increased. Will recheck a1c in 2 months at f/u  Orders:  -     insulin detemir (Levemir FlexPen) 100 UNIT/ML injection; Inject 45 Units under the skin into the appropriate area as directed Daily.  Dispense: 30 mL; Refill: 2  -     busPIRone (BUSPAR) 10 MG tablet; Take 1 tablet by mouth 3 (Three) Times a Day As Needed (anxiety).  Dispense: 180 tablet; Refill: 1  -     lisinopril (Zestril) 2.5 MG tablet; Take 1 tablet by mouth Daily.  Dispense: 90 tablet; Refill: 1  -     atorvastatin (LIPITOR) 40 MG tablet; Take 1 tablet by mouth Daily.  Dispense: 90 tablet; Refill: 1    4. Alexi  Comments:  Will refer for sleep study  Orders:  -     Ambulatory Referral to Sleep Medicine    Other orders  -     Synjardy XR 12.5-1000 MG tablet sustained-release 24 hour; Take 2 tablets by mouth Daily.  Dispense: 60 tablet; Refill: 5        Follow Up   Return in about 2 months (around 8/27/2024).  Patient was given instructions and counseling regarding her condition or for health maintenance advice. Please see specific information pulled into the AVS if appropriate.

## 2024-07-01 ENCOUNTER — CLINICAL SUPPORT (OUTPATIENT)
Dept: OBSTETRICS AND GYNECOLOGY | Facility: CLINIC | Age: 44
End: 2024-07-01
Payer: MEDICAID

## 2024-07-01 DIAGNOSIS — N80.9 ENDOMETRIOSIS: Primary | ICD-10-CM

## 2024-07-01 PROCEDURE — 96372 THER/PROPH/DIAG INJ SC/IM: CPT | Performed by: OBSTETRICS & GYNECOLOGY

## 2024-07-01 NOTE — PROGRESS NOTES
Pt came in today to receive Depo Lupron 3.75mg injection  Administered in Right ventrogluteal.   No BHCG or CG obtained: Pt has had a hysterectomy  NDC: 7996-1934-37  Lot: [id number]  Exp: 09/17/2026  Pt supplied.   Pt tolerated well.  SG

## 2024-07-25 ENCOUNTER — TELEPHONE (OUTPATIENT)
Dept: CASE MANAGEMENT | Facility: OTHER | Age: 44
End: 2024-07-25
Payer: MEDICAID

## 2024-07-29 ENCOUNTER — CLINICAL SUPPORT (OUTPATIENT)
Dept: OBSTETRICS AND GYNECOLOGY | Facility: CLINIC | Age: 44
End: 2024-07-29
Payer: MEDICAID

## 2024-07-29 ENCOUNTER — OFFICE VISIT (OUTPATIENT)
Dept: OBSTETRICS AND GYNECOLOGY | Facility: CLINIC | Age: 44
End: 2024-07-29
Payer: MEDICAID

## 2024-07-29 VITALS
BODY MASS INDEX: 29.77 KG/M2 | WEIGHT: 201 LBS | HEIGHT: 69 IN | DIASTOLIC BLOOD PRESSURE: 87 MMHG | HEART RATE: 66 BPM | SYSTOLIC BLOOD PRESSURE: 132 MMHG

## 2024-07-29 DIAGNOSIS — N80.129 ENDOMETRIOMA: ICD-10-CM

## 2024-07-29 DIAGNOSIS — N80.9 ENDOMETRIOSIS: ICD-10-CM

## 2024-07-29 DIAGNOSIS — G89.29 CHRONIC PELVIC PAIN IN FEMALE: Primary | ICD-10-CM

## 2024-07-29 DIAGNOSIS — R10.2 CHRONIC PELVIC PAIN IN FEMALE: Primary | ICD-10-CM

## 2024-07-29 DIAGNOSIS — N80.9 ENDOMETRIOSIS: Primary | ICD-10-CM

## 2024-07-29 PROCEDURE — 1159F MED LIST DOCD IN RCRD: CPT | Performed by: OBSTETRICS & GYNECOLOGY

## 2024-07-29 PROCEDURE — 1160F RVW MEDS BY RX/DR IN RCRD: CPT | Performed by: OBSTETRICS & GYNECOLOGY

## 2024-07-29 PROCEDURE — 99213 OFFICE O/P EST LOW 20 MIN: CPT | Performed by: OBSTETRICS & GYNECOLOGY

## 2024-07-29 NOTE — PROGRESS NOTES
"[facility]  Gynecological Visit    CC: Follow-up meds    Subjective:   44 y.o. who presents in follow-up of medical therapy treatment of endometriosis and abdominal wall endometriomas.  The patient reports that overall her symptoms are well-controlled.  She does have some continued hot flashes with the Lupaneta but overall not too bad.  She is considering the option of proceeding with surgery.    History:   Past medical history, medications, allergies, surgical history, social history, and obstetrical history all reviewed and updated.    Last Completed Pap Smear       This patient has no relevant Health Maintenance data.          Objective:/87   Pulse 66   Ht 175.3 cm (69.02\")   Wt 91.2 kg (201 lb)   BMI 29.67 kg/m²     Physical Exam  Vitals and nursing note reviewed.   Constitutional:       General: She is not in acute distress.     Appearance: Normal appearance. She is not ill-appearing.   Neurological:      Mental Status: She is alert and oriented to person, place, and time.   Psychiatric:         Mood and Affect: Mood normal.         Behavior: Behavior normal.         Thought Content: Thought content normal.         Judgment: Judgment normal.       Assessment and Plan:  Diagnoses and all orders for this visit:    1. Chronic pelvic pain in female (Primary)  Assessment & Plan:  Overall the patient's pain is well-controlled with the Lupaneta.  Patient still is considering the option of surgery with removal of her ovaries plus or minus attempted removal of the abdominal wall nodules.  The risk, benefits, alternatives of surgical management versus continued medical management have been discussed.  Continue Lupaneta  Continue meloxicam  Notify me if the patient desires to proceed with surgery.      2. Endometriosis  Overview:  Formatting of this note might be different from the original.   Added automatically from request for surgery [id number]    Assessment & Plan:  Continue Lupaneta " monthly.  Continue norethindrone 5 mg daily for add back therapy.      3. Endometrioma  Overview:  Abdominal wall          Counseling: TRACK MENSES, RTO if <q21 days (frequent) or >q3mo (infrequent IF not on hormonal BC), >7d long, heavy, or painful.      Follow Up:  Return in about 3 months (around 10/29/2024) for Recheck.    Abbe Kwoalski MD  07/29/2024

## 2024-07-29 NOTE — PROGRESS NOTES
Pt came in today to receive Depo Lupron 3.75mg injection  Administered in LEFT ventrogluteal.   [facility]: Pt has had a [facility]  NDC: [id number]  Lot: [id number]  Exp: 10/10/2026  Pt supplied.   Pt tolerated well.  Gena Ivan MA   Per  it was ok to give pt her injection 1 day earlier

## 2024-07-30 PROBLEM — R10.2 PELVIC PAIN: Status: RESOLVED | Noted: 2024-04-25 | Resolved: 2024-07-30

## 2024-07-30 NOTE — ASSESSMENT & PLAN NOTE
Overall the patient's pain is well-controlled with the Lupaneta.  Patient still is considering the option of surgery with removal of her ovaries plus or minus attempted removal of the abdominal wall nodules.  The risk, benefits, alternatives of surgical management versus continued medical management have been discussed.  Continue Lupaneta  Continue meloxicam  Notify me if the patient desires to proceed with surgery.

## 2024-08-02 ENCOUNTER — TELEPHONE (OUTPATIENT)
Dept: OBSTETRICS AND GYNECOLOGY | Facility: CLINIC | Age: 44
End: 2024-08-02
Payer: MEDICAID

## 2024-08-02 NOTE — TELEPHONE ENCOUNTER
[id number]: lm need to schedule 3 month gyn fu with dr Kowalski. HUB TO RELAY  [id number]: letter sent for scheduling dr kowalski in 3 months

## 2024-08-07 DIAGNOSIS — R10.2 CHRONIC PELVIC PAIN IN FEMALE: ICD-10-CM

## 2024-08-07 DIAGNOSIS — G89.29 CHRONIC PELVIC PAIN IN FEMALE: ICD-10-CM

## 2024-08-08 RX ORDER — MELOXICAM 15 MG/1
15 TABLET ORAL DAILY
Qty: 30 TABLET | Refills: 0 | Status: SHIPPED | OUTPATIENT
Start: 2024-08-08

## 2024-08-08 RX ORDER — PEN NEEDLE, DIABETIC 32GX 5/32"
NEEDLE, DISPOSABLE MISCELLANEOUS SEE ADMIN INSTRUCTIONS
Qty: 100 EACH | Refills: 0 | Status: SHIPPED | OUTPATIENT
Start: 2024-08-08

## 2024-08-08 NOTE — TELEPHONE ENCOUNTER
Her pharmacy sent a refill request for her Mobic medication.  She was last seen on 07/29/24 and there are no upcoming visits scheduled.  The last Rx was sent to the pharmacy on 04/24/24 Meloxicam 15 mg daily#30 with 3 additional refills and was last refilled on 07/20/24.  Please advise.  Thanks.

## 2024-08-16 ENCOUNTER — PRIOR AUTHORIZATION (OUTPATIENT)
Dept: INTERNAL MEDICINE | Facility: CLINIC | Age: 44
End: 2024-08-16
Payer: MEDICAID

## 2024-08-16 NOTE — TELEPHONE ENCOUNTER
Synjardy XR 12.5-1000MG er tablets    The request has been approved. The authorization is effective from 08/16/2024 to 08/16/2025, as long as the member is enrolled in their current health plan. A written notification letter will follow with additional details.

## 2024-08-26 ENCOUNTER — PATIENT OUTREACH (OUTPATIENT)
Dept: CASE MANAGEMENT | Facility: OTHER | Age: 44
End: 2024-08-26
Payer: MEDICAID

## 2024-08-26 DIAGNOSIS — E11.65 TYPE 2 DIABETES MELLITUS WITH HYPERGLYCEMIA, WITHOUT LONG-TERM CURRENT USE OF INSULIN: Primary | ICD-10-CM

## 2024-08-28 ENCOUNTER — OFFICE VISIT (OUTPATIENT)
Dept: INTERNAL MEDICINE | Facility: CLINIC | Age: 44
End: 2024-08-28
Payer: MEDICAID

## 2024-08-28 VITALS
HEART RATE: 72 BPM | OXYGEN SATURATION: 94 % | BODY MASS INDEX: 30.18 KG/M2 | HEIGHT: 69 IN | WEIGHT: 203.8 LBS | TEMPERATURE: 96.9 F | DIASTOLIC BLOOD PRESSURE: 90 MMHG | RESPIRATION RATE: 16 BRPM | SYSTOLIC BLOOD PRESSURE: 110 MMHG

## 2024-08-28 DIAGNOSIS — F41.1 GENERALIZED ANXIETY DISORDER: ICD-10-CM

## 2024-08-28 DIAGNOSIS — R07.9 CHEST PAIN, UNSPECIFIED TYPE: Primary | ICD-10-CM

## 2024-08-28 DIAGNOSIS — E11.8 TYPE 2 DIABETES MELLITUS WITH COMPLICATION: ICD-10-CM

## 2024-08-28 DIAGNOSIS — R06.02 SHORTNESS OF BREATH: ICD-10-CM

## 2024-08-28 DIAGNOSIS — K21.9 GERD WITHOUT ESOPHAGITIS: ICD-10-CM

## 2024-08-28 DIAGNOSIS — E03.9 HYPOTHYROIDISM, UNSPECIFIED TYPE: ICD-10-CM

## 2024-08-28 DIAGNOSIS — E11.65 TYPE 2 DIABETES MELLITUS WITH HYPERGLYCEMIA, WITHOUT LONG-TERM CURRENT USE OF INSULIN: ICD-10-CM

## 2024-08-28 DIAGNOSIS — E78.2 MIXED HYPERLIPIDEMIA: ICD-10-CM

## 2024-08-28 LAB — TROPONIN T SERPL HS-MCNC: <6 NG/L

## 2024-08-28 PROCEDURE — 1160F RVW MEDS BY RX/DR IN RCRD: CPT | Performed by: PHYSICIAN ASSISTANT

## 2024-08-28 PROCEDURE — 3046F HEMOGLOBIN A1C LEVEL >9.0%: CPT | Performed by: PHYSICIAN ASSISTANT

## 2024-08-28 PROCEDURE — 83036 HEMOGLOBIN GLYCOSYLATED A1C: CPT | Performed by: PHYSICIAN ASSISTANT

## 2024-08-28 PROCEDURE — 80050 GENERAL HEALTH PANEL: CPT | Performed by: PHYSICIAN ASSISTANT

## 2024-08-28 PROCEDURE — 99214 OFFICE O/P EST MOD 30 MIN: CPT | Performed by: PHYSICIAN ASSISTANT

## 2024-08-28 PROCEDURE — 84484 ASSAY OF TROPONIN QUANT: CPT | Performed by: PHYSICIAN ASSISTANT

## 2024-08-28 PROCEDURE — 1125F AMNT PAIN NOTED PAIN PRSNT: CPT | Performed by: PHYSICIAN ASSISTANT

## 2024-08-28 PROCEDURE — 1159F MED LIST DOCD IN RCRD: CPT | Performed by: PHYSICIAN ASSISTANT

## 2024-08-28 PROCEDURE — 80061 LIPID PANEL: CPT | Performed by: PHYSICIAN ASSISTANT

## 2024-08-28 NOTE — PROGRESS NOTES
Chief Complaint  Shortness of Breath, Bloated, and Chest Pain (Chest pain from shortness of breath )    Subjective          Sharmaine Torres presents to [facility]  History of Present Illness  Chest pain in mid epigastric area x 3 wks  Symptoms come and goes  Feels tight   Sx happen after being active when she sits down.  Moves under R armpit. Denies radiation of pain into back or jaw.   Denies associated sweating or nausea.   It is tender to touch  Has associated shortness of breath  Denies cough, wheezing, resp distress.  She states she rode a rollercoaster before it started. She has been lifting her grandson lately.  Denies syncope, loc  BG running 154  Denies low bgs.  She has had swelling in stomach ache  She has had diarrhea  Denies nausea or vomiting  Admits to heart burn  She feels abdominal bloating  She has not tried anything for symptoms.  Past Medical History:   Diagnosis Date    Abnormal Pap smear of cervix     precancerous cells pt had hyst for this issue    Anxiety     Depression     Diabetes mellitus     Endometriosis     Foot ulcer     Hyperlipidemia     Hypothyroidism     Obesity         Past Surgical History:   Procedure Laterality Date    APPENDECTOMY      CERVICAL CONE BIOPSY      several according to pt     SECTION      CHOLECYSTECTOMY      HAND SURGERY      HYSTERECTOMY      TUBAL ABDOMINAL LIGATION          Current Outpatient Medications on File Prior to Visit   Medication Sig Dispense Refill    BD Pen Needle Nunu 2nd Gen 32G X 4 MM misc See Admin Instructions.      BD Pen Needle Nunu 2nd Gen 32G X 4 MM misc USE AS DIRECTED 100 each 0    Blood Glucose Monitoring Suppl w/Device kit Dispense 1 kit with monitor to check blood sugar once daily. Diagnosis: DM E11.65 1 each 0    busPIRone (BUSPAR) 10 MG tablet Take 1 tablet by mouth 3 (Three) Times a Day As Needed (anxiety). 180 tablet 1    cetirizine (zyrTEC) 10 MG  tablet Take 1 tablet by mouth Daily. 30 tablet 3    meloxicam (MOBIC) 15 MG tablet Take 1 tablet by mouth once daily 30 tablet 0    Synjardy XR 12.5-1000 MG tablet sustained-release 24 hour Take 2 tablets by mouth Daily. 60 tablet 5    clotrimazole (LOTRIMIN) 1 % cream Apply 1 Application topically to the appropriate area as directed 2 (Two) Times a Day. 45 g 0    clotrimazole-betamethasone (LOTRISONE) 1-0.05 % cream Apply 1 Application topically to the appropriate area as directed 2 (Two) Times a Day. Apply to affected area twice daily 45 g 2    glucose blood test strip Check blood sugar once daily in the morning 100 each 12    insulin detemir (Levemir FlexPen) 100 UNIT/ML injection Inject 45 Units under the skin into the appropriate area as directed Daily. 30 mL 2    Lancets (freestyle) lancets Check blood sugar once daily in the morning 100 each 12    leuprolide (Lupron Depot, 1-Month,) 3.75 MG injection Inject 3.75 mg into the appropriate muscle as directed by prescriber Every 28 (Twenty-Eight) Days.      norethindrone (Aygestin) 5 MG tablet Take 1 tablet by mouth Daily. 30 tablet 6    [DISCONTINUED] atorvastatin (LIPITOR) 40 MG tablet Take 1 tablet by mouth Daily. (Patient not taking: Reported on 8/28/2024) 90 tablet 1    [DISCONTINUED] lisinopril (Zestril) 2.5 MG tablet Take 1 tablet by mouth Daily. (Patient not taking: Reported on 8/28/2024) 90 tablet 1     Current Facility-Administered Medications on File Prior to Visit   Medication Dose Route Frequency Provider Last Rate Last Admin    leuprolide (LUPRON) injection 3.75 mg  3.75 mg Intramuscular Q30 Days Abbe Kowalski MD   3.75 mg at 07/29/24 1647        Allergies   Allergen Reactions    Codeine Nausea And Vomiting     Pt stated tylenol codeine       Social History     Tobacco Use   Smoking Status Never   Smokeless Tobacco Never          Objective   Vital Signs:   /90 (BP Location: Left arm, Patient Position: Sitting, Cuff Size: Adult)    "Pulse 72   Temp 96.9 °F (36.1 °C) (Temporal)   Resp 16   Ht 175.3 cm (69.02\")   Wt 92.4 kg (203 lb 12.8 oz)   SpO2 94%   BMI 30.08 kg/m²     Physical Exam  Vitals reviewed.   Constitutional:       Appearance: Normal appearance.   HENT:      Head: Normocephalic and atraumatic.      Nose: Nose normal.      Mouth/Throat:      Mouth: Mucous membranes are moist.   Eyes:      Extraocular Movements: Extraocular movements intact.      Conjunctiva/sclera: Conjunctivae normal.      Pupils: Pupils are equal, round, and reactive to light.   Cardiovascular:      Rate and Rhythm: Normal rate and regular rhythm.   Pulmonary:      Effort: Pulmonary effort is normal.      Breath sounds: Normal breath sounds.   Chest:      Chest wall: Tenderness present.       Abdominal:      General: Abdomen is flat. Bowel sounds are normal.      Palpations: Abdomen is soft.   Musculoskeletal:         General: Normal range of motion.   Neurological:      General: No focal deficit present.      Mental Status: She is alert and oriented to person, place, and time.   Psychiatric:         Mood and Affect: Mood normal.        Result Review :                     ECG 12 Lead    Date/Time: 8/29/2024 9:16 AM  Performed by: Adri Reeder PA-C    Authorized by: Adri Reeder PA-C  Comparison: compared with previous ECG from 10/8/2020  Similar to previous ECG  Rhythm: sinus rhythm  Rate: normal  BPM: 78  ST Segments: ST segments normal  T Waves: T waves normal    Clinical impression: normal ECG            Assessment and Plan    Diagnoses and all orders for this visit:    1. Chest pain, unspecified type (Primary)  Assessment & Plan:  Discussed ddx chest pain. EKG WNL. CXR normal. Labs today with stat troponin. Discussed it does not sound cardiac but pt is higher risk due to uncontrolled dm. Discussed red flag symptoms and when to go to er- if symptoms worsen, cp that moves to jaw/shoulder, associated dizziness or syncope, sweating/nausea. Chest wall " is TTP, likely MSK affecting symptoms. Can use heat/ice and nsaid prn. Also pt having new gerd symptoms. Will start H2 blocker. Pt understands and agrees with plan.    Orders:  -     ECG 12 Lead  -     XR Chest PA & Lateral  -     High Sensitivity Troponin T    2. Shortness of breath  -     XR Chest PA & Lateral    3. Type 2 diabetes mellitus with complication  -     Hemoglobin A1c    4. Mixed hyperlipidemia  -     Lipid Panel    5. Generalized anxiety disorder  -     Comprehensive Metabolic Panel  -     CBC & Differential    6. Hypothyroidism, unspecified type  -     TSH    7. GERD without esophagitis    8. Type 2 diabetes mellitus with hyperglycemia, without long-term current use of insulin  Comments:  Labs today to eval a1c  Orders:  -     atorvastatin (LIPITOR) 40 MG tablet; Take 1 tablet by mouth Daily.  Dispense: 90 tablet; Refill: 1  -     lisinopril (Zestril) 2.5 MG tablet; Take 1 tablet by mouth Daily.  Dispense: 90 tablet; Refill: 1    Other orders  -     famotidine (Pepcid) 20 MG tablet; Take 1 tablet by mouth 2 (Two) Times a Day As Needed for Heartburn.  Dispense: 60 tablet; Refill: 1        Follow Up   Return in about 2 weeks (around 9/11/2024).  Patient was given instructions and counseling regarding her condition or for health maintenance advice. Please see specific information pulled into the AVS if appropriate.

## 2024-08-29 ENCOUNTER — PATIENT OUTREACH (OUTPATIENT)
Dept: CASE MANAGEMENT | Facility: OTHER | Age: 44
End: 2024-08-29
Payer: MEDICAID

## 2024-08-29 ENCOUNTER — TELEPHONE (OUTPATIENT)
Dept: OBSTETRICS AND GYNECOLOGY | Facility: CLINIC | Age: 44
End: 2024-08-29
Payer: MEDICAID

## 2024-08-29 DIAGNOSIS — R03.0 ELEVATED BLOOD PRESSURE READING: ICD-10-CM

## 2024-08-29 DIAGNOSIS — E11.65 TYPE 2 DIABETES MELLITUS WITH HYPERGLYCEMIA, WITHOUT LONG-TERM CURRENT USE OF INSULIN: Primary | ICD-10-CM

## 2024-08-29 PROBLEM — R07.9 CHEST PAIN: Status: ACTIVE | Noted: 2024-08-29

## 2024-08-29 LAB
ALBUMIN SERPL-MCNC: 4.7 G/DL (ref 3.5–5.2)
ALBUMIN/GLOB SERPL: 1.6 G/DL
ALP SERPL-CCNC: 122 U/L (ref 39–117)
ALT SERPL W P-5'-P-CCNC: 22 U/L (ref 1–33)
ANION GAP SERPL CALCULATED.3IONS-SCNC: 12 MMOL/L (ref 5–15)
AST SERPL-CCNC: 21 U/L (ref 1–32)
BASOPHILS # BLD AUTO: 0.07 10*3/MM3 (ref 0–0.2)
BASOPHILS NFR BLD AUTO: 1.2 % (ref 0–1.5)
BILIRUB SERPL-MCNC: 0.2 MG/DL (ref 0–1.2)
BUN SERPL-MCNC: 11 MG/DL (ref 6–20)
BUN/CREAT SERPL: 22 (ref 7–25)
CALCIUM SPEC-SCNC: 9.8 MG/DL (ref 8.6–10.5)
CHLORIDE SERPL-SCNC: 102 MMOL/L (ref 98–107)
CHOLEST SERPL-MCNC: 276 MG/DL (ref 0–200)
CO2 SERPL-SCNC: 23 MMOL/L (ref 22–29)
CREAT SERPL-MCNC: 0.5 MG/DL (ref 0.57–1)
DEPRECATED RDW RBC AUTO: 41.2 FL (ref 37–54)
EGFRCR SERPLBLD CKD-EPI 2021: 120.3 ML/MIN/1.73
EOSINOPHIL # BLD AUTO: 0.16 10*3/MM3 (ref 0–0.4)
EOSINOPHIL NFR BLD AUTO: 2.8 % (ref 0.3–6.2)
ERYTHROCYTE [DISTWIDTH] IN BLOOD BY AUTOMATED COUNT: 12.6 % (ref 12.3–15.4)
GLOBULIN UR ELPH-MCNC: 2.9 GM/DL
GLUCOSE SERPL-MCNC: 195 MG/DL (ref 65–99)
HBA1C MFR BLD: 9.4 % (ref 4.8–5.6)
HCT VFR BLD AUTO: 43.8 % (ref 34–46.6)
HDLC SERPL-MCNC: 43 MG/DL (ref 40–60)
HGB BLD-MCNC: 14.5 G/DL (ref 12–15.9)
IMM GRANULOCYTES # BLD AUTO: 0.02 10*3/MM3 (ref 0–0.05)
IMM GRANULOCYTES NFR BLD AUTO: 0.3 % (ref 0–0.5)
LDLC SERPL CALC-MCNC: 164 MG/DL (ref 0–100)
LDLC/HDLC SERPL: 3.75 {RATIO}
LYMPHOCYTES # BLD AUTO: 2.14 10*3/MM3 (ref 0.7–3.1)
LYMPHOCYTES NFR BLD AUTO: 37 % (ref 19.6–45.3)
MCH RBC QN AUTO: 29.8 PG (ref 26.6–33)
MCHC RBC AUTO-ENTMCNC: 33.1 G/DL (ref 31.5–35.7)
MCV RBC AUTO: 90.1 FL (ref 79–97)
MONOCYTES # BLD AUTO: 0.42 10*3/MM3 (ref 0.1–0.9)
MONOCYTES NFR BLD AUTO: 7.3 % (ref 5–12)
NEUTROPHILS NFR BLD AUTO: 2.97 10*3/MM3 (ref 1.7–7)
NEUTROPHILS NFR BLD AUTO: 51.4 % (ref 42.7–76)
NRBC BLD AUTO-RTO: 0 /100 WBC (ref 0–0.2)
PLATELET # BLD AUTO: 249 10*3/MM3 (ref 140–450)
PMV BLD AUTO: 11.5 FL (ref 6–12)
POTASSIUM SERPL-SCNC: 4.1 MMOL/L (ref 3.5–5.2)
PROT SERPL-MCNC: 7.6 G/DL (ref 6–8.5)
RBC # BLD AUTO: 4.86 10*6/MM3 (ref 3.77–5.28)
SODIUM SERPL-SCNC: 137 MMOL/L (ref 136–145)
TRIGL SERPL-MCNC: 359 MG/DL (ref 0–150)
TSH SERPL DL<=0.05 MIU/L-ACNC: 1.97 UIU/ML (ref 0.27–4.2)
VLDLC SERPL-MCNC: 69 MG/DL (ref 5–40)
WBC NRBC COR # BLD AUTO: 5.78 10*3/MM3 (ref 3.4–10.8)

## 2024-08-29 PROCEDURE — 93000 ELECTROCARDIOGRAM COMPLETE: CPT | Performed by: PHYSICIAN ASSISTANT

## 2024-08-29 RX ORDER — FAMOTIDINE 20 MG/1
20 TABLET, FILM COATED ORAL 2 TIMES DAILY PRN
Qty: 60 TABLET | Refills: 1 | Status: SHIPPED | OUTPATIENT
Start: 2024-08-29

## 2024-08-29 RX ORDER — ATORVASTATIN CALCIUM 40 MG/1
40 TABLET, FILM COATED ORAL DAILY
Qty: 90 TABLET | Refills: 1 | Status: SHIPPED | OUTPATIENT
Start: 2024-08-29

## 2024-08-29 RX ORDER — LISINOPRIL 2.5 MG/1
2.5 TABLET ORAL DAILY
Qty: 90 TABLET | Refills: 1 | Status: SHIPPED | OUTPATIENT
Start: 2024-08-29

## 2024-08-29 NOTE — TELEPHONE ENCOUNTER
Hub staff attempted to follow warm transfer process and was unsuccessful     Caller: Sharmaine Torres    Relationship to patient: Self    Best call back number:   Telephone Information:   Mobile [phone]     Patient is needing: PT NEEDS TO SCHEDULE HER LUPRON INJECTION APPT

## 2024-08-29 NOTE — ASSESSMENT & PLAN NOTE
Discussed ddx chest pain. EKG WNL. CXR normal. Labs today with stat troponin. Discussed it does not sound cardiac but pt is higher risk due to uncontrolled dm. Discussed red flag symptoms and when to go to er- if symptoms worsen, cp that moves to jaw/shoulder, associated dizziness or syncope, sweating/nausea. Chest wall is TTP, likely MSK affecting symptoms. Can use heat/ice and nsaid prn. Also pt having new gerd symptoms. Will start H2 blocker. Pt understands and agrees with plan.

## 2024-08-29 NOTE — OUTREACH NOTE
[facility] End of Month Documentation    This Chronic Medical Management Care Plan for Sharmaine Torres, 44 y.o. female, has been monitored and managed; reviewed; revised and a new plan of care implemented for the month of August.  A cumulative time of 43  minutes was spent on this patient record this month, including phone call with patient; chart review.    Regarding the patient's problems: has Hypothyroidism; Type 2 diabetes mellitus with complication; Mixed hyperlipidemia; Generalized anxiety disorder; Type 2 diabetes mellitus with hyperglycemia, with long-term current use of insulin; Generalized abdominal pain; Diarrhea; HGSIL on Pap smear of cervix; Rectocele; Endometriosis; Chronic pelvic pain in female; Endometrioma; Menopausal symptoms; and Chest pain on their problem list., the following items were addressed: medical records; medications and any changes can be found within the plan section of the note.  A detailed listing of time spent for chronic care management is tracked within each outreach encounter.  Current medications include:  has a current medication list which includes the following prescription(s): atorvastatin, bd pen needle feliberto 2nd gen, bd pen needle feliberto 2nd gen, blood glucose monitoring suppl, buspirone, cetirizine, clotrimazole, clotrimazole-betamethasone, famotidine, glucose blood, levemir flexpen, freestyle, lupron depot (1-month), lisinopril, meloxicam, norethindrone, and synjardy xr, and the following Facility-Administered Medications: leuprolide. and the patient is reported to be patient is noncompliant with medication protocol,  Medications are reported to be non-effective in controlling symptoms and changes have been made to the medication protocol, blood pressure remains elevated.  Regarding these diagnoses, referrals were made to the following provider(s):  NA.  All notes on chart for PCP to review.    The patient was monitored remotely for mood & behavior; pain; medications.    The  patient's physical needs include:  medication education; physical healthcare; help taking medications as prescribed.     The patient's mental support needs include:  continued support    The patient's cognitive support needs include:  medication; health care; increased support    The patient's psychosocial support needs include:  medication management or adherence; continued support    The patient's functional needs include: medication education; physical healthcare    The patient's environmental needs include:  not applicable    Care Plan overall comments:  Revised and Ongoing    Refer to previous outreach notes for more information on the areas listed above.    Monthly Billing Diagnoses  (E11.65) Type 2 diabetes mellitus with hyperglycemia, without long-term current use of insulin    (R03.0) Elevated blood pressure reading    Medications   Medications have been reconciled    Care Plan progress this month:      Recently Modified Care Plans Updates made since 7/29/2024 12:00 AM      No recently modified care plans.            Current Specialty Plan of Care Status signed by both patient and provider    Instructions   Patient was provided an electronic copy of care plan  CCM services were explained and offered and patient has accepted these services.  Patient has given their written consent to receive CCM services and understands that this includes the authorization of electronic communication of medical information with the other treating providers.  Patient understands that they may stop CCM services at any time and these changes will be effective at the end of the calendar month and will effectively revocate the agreement of CCM services.  Patient understands that only one practitioner can furnish and be paid for CCM services during one calendar month.  Patient also understands that there may be co-payment or deductible fees in association with CCM services.  Patient will continue with at least monthly follow-up  calls with the Ambulatory .    Magdalena PETERS  Ambulatory Case Management    8/29/2024, 09:32 EDT

## 2024-10-07 ENCOUNTER — TELEPHONE (OUTPATIENT)
Dept: INTERNAL MEDICINE | Facility: CLINIC | Age: 44
End: 2024-10-07
Payer: MEDICAID

## 2024-10-07 NOTE — TELEPHONE ENCOUNTER
Called and spoke with pt, explained to pt she would need an apt with provider for evaluation before medication would be called in, pt agreeable to an apt, apt scheduled with provider for tomorrow.

## 2024-10-07 NOTE — TELEPHONE ENCOUNTER
Caller: Sharmaine Torres    Relationship: Self    Best call back number: [phone]     What medication are you requesting: SOMETHING FOR YEAST INFECTION AND SOMETHING TO TREAT SYMPTOMS    What are your current symptoms: VAGINAL ITCHING, SWELLING, ABNORMAL DISCHARGE, AND LOWER BACK PAIN     How long have you been experiencing symptoms: 3 DAYS    If a prescription is needed, what is your preferred pharmacy and phone number: [address] [phone]  - [phone] FX

## 2024-10-08 ENCOUNTER — OFFICE VISIT (OUTPATIENT)
Dept: INTERNAL MEDICINE | Facility: CLINIC | Age: 44
End: 2024-10-08
Payer: MEDICAID

## 2024-10-08 ENCOUNTER — TELEPHONE (OUTPATIENT)
Dept: CASE MANAGEMENT | Facility: OTHER | Age: 44
End: 2024-10-08
Payer: MEDICAID

## 2024-10-08 VITALS
OXYGEN SATURATION: 98 % | WEIGHT: 208.8 LBS | HEART RATE: 60 BPM | DIASTOLIC BLOOD PRESSURE: 88 MMHG | HEIGHT: 69 IN | BODY MASS INDEX: 30.93 KG/M2 | TEMPERATURE: 97.4 F | SYSTOLIC BLOOD PRESSURE: 122 MMHG

## 2024-10-08 DIAGNOSIS — N89.8 VAGINAL DISCHARGE: Primary | ICD-10-CM

## 2024-10-08 DIAGNOSIS — R30.0 DYSURIA: ICD-10-CM

## 2024-10-08 LAB
BACTERIA UR QL AUTO: ABNORMAL /HPF
BILIRUB UR QL STRIP: NEGATIVE
CANDIDA SPECIES: POSITIVE
CLARITY UR: CLEAR
COLOR UR: YELLOW
GARDNERELLA VAGINALIS: NEGATIVE
GLUCOSE UR STRIP-MCNC: ABNORMAL MG/DL
HGB UR QL STRIP.AUTO: ABNORMAL
HYALINE CASTS UR QL AUTO: ABNORMAL /LPF
KETONES UR QL STRIP: NEGATIVE
LEUKOCYTE ESTERASE UR QL STRIP.AUTO: NEGATIVE
NITRITE UR QL STRIP: NEGATIVE
PH UR STRIP.AUTO: 5.5 [PH] (ref 5–8)
PROT UR QL STRIP: ABNORMAL
RBC # UR STRIP: ABNORMAL /HPF
REF LAB TEST METHOD: ABNORMAL
SP GR UR STRIP: 1.03 (ref 1–1.03)
SQUAMOUS #/AREA URNS HPF: ABNORMAL /HPF
T VAGINALIS DNA VAG QL PROBE+SIG AMP: NEGATIVE
UROBILINOGEN UR QL STRIP: ABNORMAL
WBC # UR STRIP: ABNORMAL /HPF

## 2024-10-08 PROCEDURE — 87660 TRICHOMONAS VAGIN DIR PROBE: CPT | Performed by: PHYSICIAN ASSISTANT

## 2024-10-08 PROCEDURE — 87510 GARDNER VAG DNA DIR PROBE: CPT | Performed by: PHYSICIAN ASSISTANT

## 2024-10-08 PROCEDURE — 99213 OFFICE O/P EST LOW 20 MIN: CPT | Performed by: PHYSICIAN ASSISTANT

## 2024-10-08 PROCEDURE — 1159F MED LIST DOCD IN RCRD: CPT | Performed by: PHYSICIAN ASSISTANT

## 2024-10-08 PROCEDURE — 87086 URINE CULTURE/COLONY COUNT: CPT | Performed by: PHYSICIAN ASSISTANT

## 2024-10-08 PROCEDURE — 87480 CANDIDA DNA DIR PROBE: CPT | Performed by: PHYSICIAN ASSISTANT

## 2024-10-08 PROCEDURE — 1125F AMNT PAIN NOTED PAIN PRSNT: CPT | Performed by: PHYSICIAN ASSISTANT

## 2024-10-08 PROCEDURE — 3046F HEMOGLOBIN A1C LEVEL >9.0%: CPT | Performed by: PHYSICIAN ASSISTANT

## 2024-10-08 PROCEDURE — 81001 URINALYSIS AUTO W/SCOPE: CPT | Performed by: PHYSICIAN ASSISTANT

## 2024-10-08 PROCEDURE — 1160F RVW MEDS BY RX/DR IN RCRD: CPT | Performed by: PHYSICIAN ASSISTANT

## 2024-10-08 RX ORDER — FLUCONAZOLE 150 MG/1
TABLET ORAL
Qty: 2 TABLET | Refills: 0 | Status: SHIPPED | OUTPATIENT
Start: 2024-10-08

## 2024-10-08 NOTE — ASSESSMENT & PLAN NOTE
Patient deferred  exam.  Will have her do a self swab and send for culture.  Will treat empirically for vaginal yeast infection with fluconazole.  If symptoms persist or worsen patient needs to return and discussed importance of physical  exam at that time.

## 2024-10-08 NOTE — PROGRESS NOTES
"Chief Complaint  Vaginitis (Patient complains of possible yeast infection, she complains of swelling and itching around her vaginal and rectal area. Patient reports same incident around 2 months ago and medication improved)    Subjective          Sharmaine Torres presents to [facility]    Vaginal swelling and discharge- symptoms started a few days ago.  Patient states she has had this problem previously and taken Diflucan which typically resolve her problems.  She does admit to her sugars being somewhat stable but last week she had an episode of her sugars being in the 400s.    Objective   Vital Signs:   /88 (BP Location: Left arm, Patient Position: Sitting, Cuff Size: Adult)   Pulse 60   Temp 97.4 °F (36.3 °C) (Temporal)   Ht 175.3 cm (69.02\")   Wt 94.7 kg (208 lb 12.8 oz)   SpO2 98%   BMI 30.82 kg/m²     Physical Exam  Vitals reviewed.   Constitutional:       Appearance: Normal appearance. She is well-developed.   HENT:      Head: Normocephalic and atraumatic.   Eyes:      Conjunctiva/sclera: Conjunctivae normal.      Pupils: Pupils are equal, round, and reactive to light.   Cardiovascular:      Rate and Rhythm: Normal rate and regular rhythm.      Heart sounds: No murmur heard.     No friction rub. No gallop.   Pulmonary:      Effort: Pulmonary effort is normal.      Breath sounds: Normal breath sounds. No wheezing or rhonchi.   Genitourinary:     Comments: Patient deferred  exam during visit  Skin:     General: Skin is warm and dry.   Neurological:      Mental Status: She is alert and oriented to person, place, and time.      Cranial Nerves: No cranial nerve deficit.   Psychiatric:         Mood and Affect: Mood and affect normal.         Behavior: Behavior normal.         Thought Content: Thought content normal.         Judgment: Judgment normal.        Result Review :          Procedures      Assessment and Plan    Diagnoses and all orders for this " visit:    1. Vaginal discharge (Primary)  Assessment & Plan:  Patient deferred  exam.  Will have her do a self swab and send for culture.  Will treat empirically for vaginal yeast infection with fluconazole.  If symptoms persist or worsen patient needs to return and discussed importance of physical  exam at that time.    Orders:  -     fluconazole (Diflucan) 150 MG tablet; Take 1 tablet (150mg) PO then repeat in 3 days  Dispense: 2 tablet; Refill: 0  -     Urinalysis With Culture If Indicated - Urine, Clean Catch; Future  -     Urinalysis With Culture If Indicated - Urine, Clean Catch  -     Gardnerella vaginalis, Trichomonas vaginalis, Candida albicans, DNA - Swab, Vagina  -     Urinalysis, Microscopic Only - Urine, Clean Catch  -     Urine Culture - Urine, Urine, Clean Catch    2. Dysuria  -     Urinalysis With Culture If Indicated - Urine, Clean Catch; Future  -     Urinalysis With Culture If Indicated - Urine, Clean Catch  -     Urinalysis, Microscopic Only - Urine, Clean Catch  -     Urine Culture - Urine, Urine, Clean Catch              Follow Up   No follow-ups on file.  Patient was given instructions and counseling regarding her condition or for health maintenance advice. Please see specific information pulled into the AVS if appropriate.

## 2024-10-09 LAB — BACTERIA SPEC AEROBE CULT: NORMAL

## 2024-10-16 ENCOUNTER — TELEPHONE (OUTPATIENT)
Dept: CASE MANAGEMENT | Facility: OTHER | Age: 44
End: 2024-10-16
Payer: MEDICAID

## 2024-10-16 ENCOUNTER — PATIENT OUTREACH (OUTPATIENT)
Dept: CASE MANAGEMENT | Facility: OTHER | Age: 44
End: 2024-10-16
Payer: MEDICAID

## 2024-10-16 DIAGNOSIS — E11.65 TYPE 2 DIABETES MELLITUS WITH HYPERGLYCEMIA, WITHOUT LONG-TERM CURRENT USE OF INSULIN: Primary | ICD-10-CM

## 2024-10-16 NOTE — OUTREACH NOTE
AMBULATORY CASE MANAGEMENT NOTE    Names and Relationships of Patient/Support Persons: Contact: Sharmaine Torres; Relationship: Self -     CCM Interim Update    I spoke with the patient on the phone today. She states she received a a new glucometer that talks to her and gives her tips on how to manage her BG levels. Her BG was 400 the other day and she managed it by drinking water and cutting  out carbs, as well as, taking her medications. She was able to get the BG down to 144. Her AM BG today with me on the phone was 202. She states she is trying to decrease her night time snacking.     I have suggested to her that a referral to see the [facility] APRNs could be beneficial to her. She is agreeable to this.     She is also interested in trying to lose weight and starting Ozempic.      Care Coordination    I have made her an appointment for 10/18 to see her PCP to discuss this.        Magdalena PETERS  Ambulatory Case Management    10/16/2024, 14:51 EDT

## 2024-10-31 ENCOUNTER — PATIENT OUTREACH (OUTPATIENT)
Dept: CASE MANAGEMENT | Facility: OTHER | Age: 44
End: 2024-10-31
Payer: MEDICAID

## 2024-10-31 DIAGNOSIS — E11.65 TYPE 2 DIABETES MELLITUS WITH HYPERGLYCEMIA, WITHOUT LONG-TERM CURRENT USE OF INSULIN: Primary | ICD-10-CM

## 2024-10-31 DIAGNOSIS — R03.0 ELEVATED BLOOD PRESSURE READING: ICD-10-CM

## 2024-10-31 NOTE — OUTREACH NOTE
[facility] End of Month Documentation    This Chronic Medical Management Care Plan for Sharmaine Torres, 44 y.o. female, has been monitored and managed; reviewed; revised and a new plan of care implemented for the month of October.  A cumulative time of 34  minutes was spent on this patient record this month, including phone call with patient; chart review; electronic communication with primary care provider.    Regarding the patient's problems: has Hypothyroidism; Type 2 diabetes mellitus with complication; Mixed hyperlipidemia; Generalized anxiety disorder; Type 2 diabetes mellitus with hyperglycemia, with long-term current use of insulin; Generalized abdominal pain; Diarrhea; HGSIL on Pap smear of cervix; Rectocele; Endometriosis; Chronic pelvic pain in female; Endometrioma; Menopausal symptoms; Chest pain; Vaginal discharge; and Dysuria on their problem list., the following items were addressed: medical records; medications, referral to [facility] and any changes can be found within the plan section of the note.  A detailed listing of time spent for chronic care management is tracked within each outreach encounter.  Current medications include:  has a current medication list which includes the following prescription(s): atorvastatin, bd pen needle feliberto 2nd gen, bd pen needle feliberto 2nd gen, blood glucose monitoring suppl, buspirone, cetirizine, clotrimazole, clotrimazole-betamethasone, famotidine, fluconazole, glucose blood, levemir flexpen, freestyle, lupron depot (1-month), lisinopril, meloxicam, norethindrone, and synjardy xr, and the following Facility-Administered Medications: leuprolide. and the patient is reported to be patient is compliant with medication protocol,  Medications are reported to be effective, A1c has decreased from 11 to 9.  Regarding these diagnoses, referrals were made to the following provider(s):  34.  All notes on chart for PCP to review.    The patient was monitored remotely for  mood & behavior; pain; medications; blood glucose.    The patient's physical needs include:  medication education; physical healthcare; help taking medications as prescribed.     The patient's mental support needs include:  continued support    The patient's cognitive support needs include:  medication; health care; increased support    The patient's psychosocial support needs include:  medication management or adherence; continued support    The patient's functional needs include: medication education; physical healthcare, referral to [facility]    The patient's environmental needs include:  not applicable    Care Plan overall comments:  Revised and Ongoing    Refer to previous outreach notes for more information on the areas listed above.    Monthly Billing Diagnoses  (E11.65) Type 2 diabetes mellitus with hyperglycemia, without long-term current use of insulin    (R03.0) Elevated blood pressure reading    Medications   Medications have been reconciled    Care Plan progress this month:      Recently Modified Care Plans Updates made since 9/30/2024 12:00 AM      No recently modified care plans.            Current Specialty Plan of Care Status signed by both patient and provider    Instructions   Patient was provided an electronic copy of care plan  CCM services were explained and offered and patient has accepted these services.  Patient has given their written consent to receive CCM services and understands that this includes the authorization of electronic communication of medical information with the other treating providers.  Patient understands that they may stop CCM services at any time and these changes will be effective at the end of the calendar month and will effectively revocate the agreement of CCM services.  Patient understands that only one practitioner can furnish and be paid for CCM services during one calendar month.  Patient also understands that there may be co-payment or deductible  fees in association with CCM services.  Patient will continue with at least monthly follow-up calls with the Ambulatory .    Magdalena PETERS  Ambulatory Case Management    10/31/2024, 09:18 EDT

## 2024-11-12 RX ORDER — PEN NEEDLE, DIABETIC 32GX 5/32"
NEEDLE, DISPOSABLE MISCELLANEOUS SEE ADMIN INSTRUCTIONS
Qty: 100 EACH | Refills: 0 | Status: SHIPPED | OUTPATIENT
Start: 2024-11-12

## 2024-11-15 ENCOUNTER — PATIENT OUTREACH (OUTPATIENT)
Dept: CASE MANAGEMENT | Facility: OTHER | Age: 44
End: 2024-11-15
Payer: MEDICAID

## 2024-11-15 DIAGNOSIS — E11.65 TYPE 2 DIABETES MELLITUS WITH HYPERGLYCEMIA, WITHOUT LONG-TERM CURRENT USE OF INSULIN: Primary | ICD-10-CM

## 2024-11-15 DIAGNOSIS — E11.65 TYPE 2 DIABETES MELLITUS WITH HYPERGLYCEMIA, WITHOUT LONG-TERM CURRENT USE OF INSULIN: ICD-10-CM

## 2024-11-15 RX ORDER — INSULIN DETEMIR 100 [IU]/ML
45 INJECTION, SOLUTION SUBCUTANEOUS DAILY
Qty: 30 ML | Refills: 2 | Status: SHIPPED | OUTPATIENT
Start: 2024-11-15

## 2024-11-15 NOTE — OUTREACH NOTE
"AMBULATORY CASE MANAGEMENT NOTE    Names and Relationships of Patient/Support Persons: Contact: Sharmaine Torres; Relationship: Self -     CCM Interim Update    I spoke with the patient on the phone today regarding her medication taking habits, attending provider appointments and the importance of lower her blood glucose.    She had an appointment to see PCP on 10/18 but \"forgot\" about it and did not attend.     She states she is starting to take her medications more routinely again. She states there was a death in the family and that through her routine off. She acknowledges that when she routinely takes her medications, her blood glucose numbers are closer to being in range.    She states when she has checked her BG in the morning, the readings have been around 160.    She also states a barrier she has to taking her medications are the GI effects of Synjardy. I informed her that the more consistent she is with taking her medications, the less the GI side effects typically are. She verbalized understanding.    Patient has an appointment with Gabi Thomas scheduled for 12/26. She states she may have to reschedule this appointment since it is the day after Fidelia and may be out of town. I gave the patient the number to reschedule the appointment.    Patient states she needs a refill of her Levemir.      Care Coordination    I have encouraged the patient to set up MyChart. I gave her the number to MyChart Assistance.    I scheduled a follow up/AWV with the PCP for patient while I was on the phone with the patient.    I communicated electronically with the PCP regarding the patient's needs for a refill of Levemir.        Education Documentation  Sweeteners, taught by Magdalena Casiano, RN at 11/15/2024  3:31 PM.  Learner: Patient  Readiness: Acceptance  Method: Explanation, Teach Back  Response: Verbalizes Understanding, Needs Reinforcement    Monitoring Carbohydrate Intake, taught by Magdalena Casiano, RN " at 11/15/2024  3:31 PM.  Learner: Patient  Readiness: Acceptance  Method: Explanation, Teach Back  Response: Verbalizes Understanding, Needs Reinforcement    Healthy Food Choices, taught by Magdalena Casiano RN at 11/15/2024  3:31 PM.  Learner: Patient  Readiness: Acceptance  Method: Explanation, Teach Back  Response: Verbalizes Understanding, Needs Reinforcement    Carbohydrate-Containing Foods, taught by Magdalena Casiano RN at 11/15/2024  3:31 PM.  Learner: Patient  Readiness: Acceptance  Method: Explanation, Teach Back  Response: Verbalizes Understanding, Needs Reinforcement    Importance of Glycemic Management, taught by Magdalena Casiano RN at 11/15/2024  3:31 PM.  Learner: Patient  Readiness: Acceptance  Method: Explanation, Teach Back  Response: Verbalizes Understanding, Needs Reinforcement    A1C Goal, taught by Magdalena Casiano RN at 11/15/2024  3:31 PM.  Learner: Patient  Readiness: Acceptance  Method: Explanation, Teach Back  Response: Verbalizes Understanding, Needs Reinforcement    Medication Management, taught by Magdalena Casiano RN at 11/15/2024  3:31 PM.  Learner: Patient  Readiness: Acceptance  Method: Explanation, Teach Back  Response: Verbalizes Understanding, Needs Reinforcement    Hypoglycemia, taught by Magdalena Casiano RN at 11/15/2024  3:31 PM.  Learner: Patient  Readiness: Acceptance  Method: Explanation, Teach Back  Response: Verbalizes Understanding, Needs Reinforcement    Hyperglycemia, taught by Magdalena Casiano RN at 11/15/2024  3:31 PM.  Learner: Patient  Readiness: Acceptance  Method: Explanation, Teach Back  Response: Verbalizes Understanding, Needs Reinforcement    Frequency of A1C Testing, taught by Magdalena Casiano RN at 11/15/2024  3:31 PM.  Learner: Patient  Readiness: Acceptance  Method: Explanation, Teach Back  Response: Verbalizes Understanding, Needs Reinforcement    Blood Glucose Monitoring, taught by Magdalena Casiano RN at 11/15/2024  3:31 PM.  Learner: Patient  Readiness:  Acceptance  Method: Explanation, Teach Back  Response: Verbalizes Understanding, Needs Reinforcement          Magdalena PETERS  Ambulatory Case Management    11/15/2024, 15:31 EST

## 2024-11-19 ENCOUNTER — TELEPHONE (OUTPATIENT)
Dept: INTERNAL MEDICINE | Facility: CLINIC | Age: 44
End: 2024-11-19
Payer: MEDICAID

## 2024-11-19 NOTE — TELEPHONE ENCOUNTER
Pharmacy stated levemir was discontinue and they are needing a new rx for lantus or other products, please advise

## 2024-11-27 ENCOUNTER — PATIENT OUTREACH (OUTPATIENT)
Dept: CASE MANAGEMENT | Facility: OTHER | Age: 44
End: 2024-11-27
Payer: MEDICAID

## 2024-11-27 DIAGNOSIS — E11.65 TYPE 2 DIABETES MELLITUS WITH HYPERGLYCEMIA, WITHOUT LONG-TERM CURRENT USE OF INSULIN: Primary | ICD-10-CM

## 2024-11-27 DIAGNOSIS — E78.2 MIXED HYPERLIPIDEMIA: ICD-10-CM

## 2024-11-27 NOTE — OUTREACH NOTE
[facility] End of Month Documentation    This Chronic Medical Management Care Plan for Sharmaine Torres, 44 y.o. female, has been monitored and managed; reviewed; revised and a new plan of care implemented for the month of November.  A cumulative time of 43  minutes was spent on this patient record this month, including phone call with patient; electronic communication with primary care provider; chart review.    Regarding the patient's problems: has Hypothyroidism; Type 2 diabetes mellitus with complication; Mixed hyperlipidemia; Generalized anxiety disorder; Type 2 diabetes mellitus with hyperglycemia, with long-term current use of insulin; Generalized abdominal pain; Diarrhea; HGSIL on Pap smear of cervix; Rectocele; Endometriosis; Chronic pelvic pain in female; Endometrioma; Menopausal symptoms; Chest pain; Vaginal discharge; and Dysuria on their problem list., the following items were addressed: medical records; medications; referrals to community service providers, referral to [facility]--has appt for 12/26 but patient may change this appt and any changes can be found within the plan section of the note.  A detailed listing of time spent for chronic care management is tracked within each outreach encounter.  Current medications include:  has a current medication list which includes the following prescription(s): atorvastatin, bd pen needle feliberto 2nd gen, bd pen needle feliberto 2nd gen, blood glucose monitoring suppl, buspirone, cetirizine, clotrimazole, clotrimazole-betamethasone, famotidine, fluconazole, glucose blood, insulin glargine, freestyle, lupron depot (1-month), lisinopril, meloxicam, norethindrone, and synjardy xr, and the following Facility-Administered Medications: leuprolide. and the patient is reported to be patient is noncompliant with medication protocol,  Medications are reported to be non-effective in controlling symptoms and changes have been made to the medication protocol, patient's AM   BG is above goal of 120.  Regarding these diagnoses, referrals were made to the following provider(s):  [facility].  All notes on chart for PCP to review.    The patient was monitored remotely for mood & behavior; pain; medications; blood glucose.    The patient's physical needs include:  medication education; physical healthcare; help taking medications as prescribed.     The patient's mental support needs include:  continued support    The patient's cognitive support needs include:  medication; health care; increased support    The patient's psychosocial support needs include:  medication management or adherence; continued support    The patient's functional needs include: referral to [facility]    The patient's environmental needs include:  not applicable    Care Plan overall comments:  Revised and Ongoing    Refer to previous outreach notes for more information on the areas listed above.    Monthly Billing Diagnoses  (E11.65) Type 2 diabetes mellitus with hyperglycemia, without long-term current use of insulin    (E78.2) Mixed hyperlipidemia    Medications   Medications have been reconciled    Care Plan progress this month:      Recently Modified Care Plans Updates made since 10/27/2024 12:00 AM      No recently modified care plans.            Current Specialty Plan of Care Status signed by both patient and provider    Instructions   Patient was provided an electronic copy of care plan  CCM services were explained and offered and patient has accepted these services.  Patient has given their written consent to receive CCM services and understands that this includes the authorization of electronic communication of medical information with the other treating providers.  Patient understands that they may stop CCM services at any time and these changes will be effective at the end of the calendar month and will effectively revocate the agreement of CCM services.  Patient understands that only one  practitioner can furnish and be paid for CCM services during one calendar month.  Patient also understands that there may be co-payment or deductible fees in association with CCM services.  Patient will continue with at least monthly follow-up calls with the Ambulatory .    Magdalena PETERS  Ambulatory Case Management    11/27/2024, 07:46 EST

## 2024-12-04 ENCOUNTER — TELEPHONE (OUTPATIENT)
Dept: INTERNAL MEDICINE | Facility: CLINIC | Age: 44
End: 2024-12-04
Payer: MEDICAID

## 2024-12-04 RX ORDER — FLUCONAZOLE 150 MG/1
150 TABLET ORAL ONCE
Qty: 1 TABLET | Refills: 0 | Status: SHIPPED | OUTPATIENT
Start: 2024-12-04 | End: 2024-12-04

## 2024-12-04 NOTE — TELEPHONE ENCOUNTER
Hub staff attempted to follow warm transfer process and was unsuccessful     Caller: Sharmaine Torres    Relationship to patient: Self    Best call back number: [phone]    Patient is needing: PATIENT RETURNING MISSED CALL.

## 2024-12-04 NOTE — TELEPHONE ENCOUNTER
Caller: Sharmaine Torres    Relationship: Self    Best call back number: [phone]     What medication are you requesting: SOMETHING FOR YEAST INFECTION.    What are your current symptoms: DISCHARGE, ITCHING, IRRITATION, SWELLING.    How long have you been experiencing symptoms: 3 DAYS    Have you had these symptoms before:    [x] Yes  [] No    Have you been treated for these symptoms before:   [x] Yes  [] No    If a prescription is needed, what is your preferred pharmacy and phone number: [address] [phone]  - [phone] FX     Additional notes: PATIENT HAS HAD YEAST INFECTIONS BEFORE. PATIENT GETS YEAST INFECTIONS DUE TO BEING DIABETIC. PATIENT NEEDING MEDICATION CALLED IN. PLEASE ADVISE.

## 2024-12-04 NOTE — TELEPHONE ENCOUNTER
Rx sent to the pharmacy.  Please tell her to schedule a follow-up if it does not resolve after treatment.  Make sure she is taking diabetes medication as prescribed and not missing doses.  If sugars are very elevated please have her schedule a follow-up to go over those and adjust the sugar medicine to prevent further yeast infections.

## 2025-01-08 ENCOUNTER — TELEPHONE (OUTPATIENT)
Dept: CASE MANAGEMENT | Facility: OTHER | Age: 45
End: 2025-01-08
Payer: MEDICAID

## 2025-01-08 ENCOUNTER — PATIENT OUTREACH (OUTPATIENT)
Dept: CASE MANAGEMENT | Facility: OTHER | Age: 45
End: 2025-01-08
Payer: MEDICAID

## 2025-01-08 DIAGNOSIS — E11.65 TYPE 2 DIABETES MELLITUS WITH HYPERGLYCEMIA, WITHOUT LONG-TERM CURRENT USE OF INSULIN: Primary | ICD-10-CM

## 2025-01-08 DIAGNOSIS — F33.1 MAJOR DEPRESSIVE DISORDER, RECURRENT EPISODE, MODERATE DEGREE: Primary | ICD-10-CM

## 2025-01-08 NOTE — OUTREACH NOTE
AMBULATORY CASE MANAGEMENT NOTE    Names and Relationships of Patient/Support Persons: Contact: Sharmaine Torres; Relationship: Self -     CCM Interim Update    I spoke with the patient on the phone for an extended period of time. Patient remains inconsistent with blood glucose monitoring and medication management. We discussed the importance of and ways to improve both. Patient verbalized understanding.    See Full Assessment Below:  Adult Patient Profile  Questions/Answers      Flowsheet Row Most Recent Value   Symptoms/Conditions Managed at Home behavioral health, cardiovascular, diabetes, type 2   Barriers to Managing Health stress of chronic illness, understanding health advice   Additional Documentation Behavioral Health Symptoms/Conditions Management (Group), Diabetes Symptoms/Conditions Management (Group)   Cardiovascular Symptoms/Conditions high blood cholesterol, hypertension   Cardiovascular Management Strategies routine screening, medication therapy   Cardiovascular Self-Management Outcome well   Cardiovascular Comment blood pressures appear well controlled with medication according to what is in the patient's chart   Diabetes Management Strategies routine screenings, blood glucose testing, insulin therapy, medication therapy   A1C Target less than 7%   Usual Blood Glucose 300-400 in the AM   Last A1C Result 9.4 on 8/28/2024   Injection Schedule Lantus 45 units at night   Diabetes Self-Management Outcome not well   Diabetes Comment A1c went down from May 2024 to August 2024 but patient is not consistently checking her blood glucose. When she does check it, it seems to run between 300-400 in the mornings. Patient is also inconsitent taking her medications. I have stressed the importance of checking her BG routinely and taking her medications as prescribed. She verbalizes understanding. Appointments have been made for her to see JANE Thomas on 1/9 and Aydee Clement on 1/20. Patient is aware and is  agreeable to attend these appointments. This will give us a better understanding of where her A1c is currently.   Behavioral Health Symptoms/Conditions anxiety, depression   Behavioral Management Strategies medication therapy   Behavioral Health Self-Management Outcome not well   Behavioral Health Comment Patient believes she has ADHD and is need of evaluation to see if she could benefit from medication to treat. She also states she has had some difficult times lately, including the death of a close family member. She is agreeable to see someone with Behavioral Health. I will pend a referral for PCP.   Taking Medications Not Prescribed no   Missed Doses of Prescribed Medications During Past Week yes   Taken Prescribed Medications at Different Time or Schedule During Past Week yes   Taken More or Less Medication Than Prescribed yes   Barriers to Taking Medication as Prescribed forget to take it          Care Coordination    I called and made appointments for the patient for the Diabetes Clinic and the Diabetes Educator.    I communicated electronically to PCP regarding appointments made.    I pended referral for Behavorial Health to PCP for patient.      Education Documentation  Consistent Eating Pattern, taught by Magdalena Casiano RN at 1/8/2025 11:25 AM.  Learner: Patient  Readiness: Acceptance  Method: Explanation  Response: Verbalizes Understanding, Needs Reinforcement    Carbohydrate Counting, taught by Magdalena Casiano, RN at 1/8/2025 11:25 AM.  Learner: Patient  Readiness: Acceptance  Method: Explanation  Response: Verbalizes Understanding, Needs Reinforcement    Monitoring Carbohydrate Intake, taught by Magdalena Casiano, RN at 1/8/2025 11:25 AM.  Learner: Patient  Readiness: Acceptance  Method: Explanation  Response: Verbalizes Understanding, Needs Reinforcement    Healthy Food Choices, taught by Magdalena Casiano RN at 1/8/2025 11:25 AM.  Learner: Patient  Readiness: Acceptance  Method: Explanation  Response:  Verbalizes Understanding, Needs Reinforcement    Carbohydrate-Containing Foods, taught by Magdalena Casiano RN at 1/8/2025 11:25 AM.  Learner: Patient  Readiness: Acceptance  Method: Explanation  Response: Verbalizes Understanding, Needs Reinforcement    Oral Medication, taught by Magdalena Casiano RN at 1/8/2025 11:25 AM.  Learner: Patient  Readiness: Acceptance  Method: Explanation  Response: Verbalizes Understanding, Needs Reinforcement    Insulin Therapy, taught by Magdalena Casiano RN at 1/8/2025 11:25 AM.  Learner: Patient  Readiness: Acceptance  Method: Explanation  Response: Verbalizes Understanding, Needs Reinforcement    Importance of Glycemic Management, taught by Magdalena Casiano RN at 1/8/2025 11:25 AM.  Learner: Patient  Readiness: Acceptance  Method: Explanation  Response: Verbalizes Understanding, Needs Reinforcement    Blood Glucose Monitoring, taught by Magdalena Casiano RN at 1/8/2025 11:25 AM.  Learner: Patient  Readiness: Acceptance  Method: Explanation  Response: Verbalizes Understanding, Needs Reinforcement    A1C Goal, taught by Magdalena Casiano RN at 1/8/2025 11:25 AM.  Learner: Patient  Readiness: Acceptance  Method: Explanation  Response: Verbalizes Understanding, Needs Reinforcement    Frequency of Testing, taught by Magdalena Casiano RN at 1/8/2025 11:25 AM.  Learner: Patient  Readiness: Acceptance  Method: Explanation  Response: Verbalizes Understanding, Needs Reinforcement    Definition, taught by Magdalena Casiano RN at 1/8/2025 11:25 AM.  Learner: Patient  Readiness: Acceptance  Method: Explanation  Response: Verbalizes Understanding, Needs Reinforcement    Medication Management, taught by Magdalena Casiano RN at 1/8/2025 11:25 AM.  Learner: Patient  Readiness: Acceptance  Method: Explanation  Response: Verbalizes Understanding, Needs Reinforcement    Frequency of A1C Testing, taught by Magdalena Casiano RN at 1/8/2025 11:25 AM.  Learner: Patient  Readiness: Acceptance  Method:  Explanation  Response: Verbalizes Understanding, Needs Reinforcement    Blood Glucose Monitoring, taught by Magdalena Casiano, RN at 1/8/2025 11:25 AM.  Learner: Patient  Readiness: Acceptance  Method: Explanation  Response: Verbalizes Understanding, Needs Reinforcement    A1C Definition, taught by Magdalena Casiano, RN at 1/8/2025 11:25 AM.  Learner: Patient  Readiness: Acceptance  Method: Explanation  Response: Verbalizes Understanding, Needs Reinforcement          Magdalena PETERS  Ambulatory Case Management    1/8/2025, 11:25 EST

## 2025-01-08 NOTE — TELEPHONE ENCOUNTER
I spoke with patient on the phone today. She stated she has ADHD and is not medicated. She also stated that she is having trouble with depression. She has had a recent death of a close family member. She is agreeable to seeing Behavioral Health.    I have pended an order for you to sign, if you are agreeable.    Thanks,  FRAN Nichols

## 2025-01-09 ENCOUNTER — OFFICE VISIT (OUTPATIENT)
Age: 45
End: 2025-01-09
Payer: MEDICAID

## 2025-01-09 VITALS
WEIGHT: 207.7 LBS | HEART RATE: 96 BPM | SYSTOLIC BLOOD PRESSURE: 153 MMHG | HEIGHT: 69 IN | DIASTOLIC BLOOD PRESSURE: 94 MMHG | BODY MASS INDEX: 30.76 KG/M2 | OXYGEN SATURATION: 96 %

## 2025-01-09 DIAGNOSIS — E11.65 TYPE 2 DIABETES MELLITUS WITH HYPERGLYCEMIA, WITH LONG-TERM CURRENT USE OF INSULIN: Primary | ICD-10-CM

## 2025-01-09 DIAGNOSIS — Z91.148 NONCOMPLIANCE WITH MEDICATION REGIMEN: ICD-10-CM

## 2025-01-09 DIAGNOSIS — Z79.4 TYPE 2 DIABETES MELLITUS WITH HYPERGLYCEMIA, WITH LONG-TERM CURRENT USE OF INSULIN: Primary | ICD-10-CM

## 2025-01-09 RX ORDER — SEMAGLUTIDE 0.68 MG/ML
0.5 INJECTION, SOLUTION SUBCUTANEOUS WEEKLY
Qty: 3 ML | Refills: 1 | Status: SHIPPED | OUTPATIENT
Start: 2025-01-09

## 2025-01-09 RX ORDER — ACYCLOVIR 800 MG/1
1 TABLET ORAL
Qty: 2 EACH | Refills: 5 | Status: SHIPPED | OUTPATIENT
Start: 2025-01-09

## 2025-01-09 NOTE — PROGRESS NOTES
Chief Complaint  Establish Care (Pt is a 44 yr old female presenting to Diabetes Care to establish care for diabetes management./)    Referred By: Adri Reeder PA-C    Subjective          Sharmaine Torres presents to [facility] for diabetes medication management    History of Present Illness  The patient is a 44-year-old female who presents to [facility].    She has been managing diabetes for approximately 10 years, with a family history of the condition in her grandparents and father. Her blood glucose level was recorded at 364 today. She experiences recurrent yeast infections, particularly in the buttocks region, accompanied by itching and skin peeling. She also reports the presence of yeast spots on her skin. She occasionally experiences frequent urination and excessive sweating after meals, although this symptom has not been present in the past two weeks. She describes a sensation of thirst and stickiness, and often wakes up with a dry mouth. She admits to consuming candy at night and experiences increased hunger when her blood sugar levels are elevated. She reports blurry vision and has been prescribed bifocals following an eye exam at [id number] in [place] within the last 12 months, where redness behind the eyes was noted. She occasionally experiences numbness, tingling, and shooting pain in her feet and hands, and has numbness in her toes and hands. She also reports leg cramps and Charley horses. She has not been diagnosed with gastroparesis but reports gastrointestinal issues. She has a history of constipation and diarrhea. She has never been hospitalized due to her blood sugar levels but has received insulin in the hospital once. She is currently on 45 units of Lantus at night and takes Synjardy XR 12.5-1000 mg twice daily, although she admits to occasionally missing doses due to gastrointestinal upset. She has previously tried Trulicity, which caused  vomiting, and Ozempic, which was effective without causing illness. She uses the Cignifio system to monitor her blood sugar levels but has been unable to use it recently due to technical issues. She drinks Diet Coke, water, and Crystal Light. She typically consumes one large meal per day, usually dinner, and tries to stay physically active. She sees a podiatrist for diabetic foot care. She has met with a dietitian and diabetes educator in the past. She was started on Synjardy when her A1c was 9.4 in August. She has previously tried Jardiance and Farxiga, which caused urinary tract infections. She has noticed sugar in her urine and reports a decreased sex drive. She occasionally experiences a twitch in her arm when injecting insulin into her stomach and has noticed lumps in her stomach. She has been using a treadmill for exercise.    She has endometriosis and had a hysterectomy, during which her uterus was removed but her ovaries were left intact. Now, they want to remove the ovaries because they grew back. She had a non-cancerous endometriosis tumor removed, which was the size of a golf ball and located in her lower abdomen. She was taking injections for endometriosis hormone therapy and was in menopause, but her insurance stopped covering it, so she has been off them for a couple of months now.    She has a history of anxiety and takes buspirone at night, which makes her feel hungry.    Supplemental Information  She has a history of appendectomy and cholecystectomy due to gallstones. She has had kidney stones in the past. She has a history of thyroid issues and has taken thyroid medication for years, but her thyroid function is currently normal.    FAMILY HISTORY  Her grandparents and father have diabetes. Her paternal great-grandmother had diabetes and was on insulin. Her mother had goiters and had to do radiation for her thyroid.    MEDICATIONS  Current: Lantus, Synjardy XR, buspirone  Past: Levemir, Trulicity,  Ozempic, metformin, Jardiance, Farxiga      History of Present Illness    Visit type:  to establish care  Diabetes type:  Type 2  Age at time of dx/Year of dx/Number of years: Approximately 10 years  Family History of Diabetes: Father, paternal grandmother,paternal great grandmother,   Current diabetes status/concerns/issues: Significantly elevated blood glucose numbers leading to yeast infections  Other current health concerns: Endometriosis  Current Diabetes symptoms:    Polyuria: Yes occasionally   Polydipsia: Yes     Polyphagia: Yes when blood glucose elevated   Blurred vision: Yes     Excessive fatigue: Yes    Known Diabetes complications:  Neuropathy: Numbness; Location: N/A, Feet, Hands, and Bilateral  Renal: No current urine microalbumin available and Normal eGFR per current labs  Eyes: No current eye exam available in record; Location: N/A; Last Eye Exam:  Within the last 12 months ; Location: [facility]  Amputation/Wounds: None  GI: Constipation, Diarrhea, Reflux, and Indigestion  Cardiovascular: Hyperlipidemia, Hypercholesterolemia, and Hypertriglyceridemia  ED: N/A  Other: None  Hospitalizations/ED/911 secondary to DM?  No  Hypoglycemia:  None reported at this time  Hypoglycemia Symptoms:  No hypoglycemia at this time  Current Diabetes treatment: Lantus 45 units nightly, Synjardy XR 12.5-1000 2 pills daily  Prior diabetes treatments: Ozempic, metformin, Trulicity  Using ACEI or ARB: Yes, 2.5 mg daily, Managed by other provider  Using Statin: Yes, Lipitor 40 mg nightly, Managed by other provider  Blood glucose device:  Meter  Blood glucose monitoring frequency:  Random/occasional  Blood glucose range/average:   Average glucose of 392 mg/dL over the last 30 days  Glucose Source: Patient Reported  Dietary behavior:  Limits high carb/sweet foods, Avoids sugary drinks, Number of meals each day - 2; Number of snacks each day - graze  Activity/Exercise:   Intentional walking  Last Foot Exam:   current with Dr. Pitts, podiatry  Diabetes Education Hx: Comprehensive Diabetes Education and Diabetes Nutrition Counseling  Social Determinants of Health: None    Past Medical History:   Diagnosis Date    Abnormal Pap smear of cervix     precancerous cells pt had hyst for this issue    Anxiety     Depression     Diabetes mellitus     Endometriosis     Foot ulcer     Hyperlipidemia     Hypothyroidism     Obesity      Past Surgical History:   Procedure Laterality Date    APPENDECTOMY      CERVICAL CONE BIOPSY      several according to pt     SECTION      CHOLECYSTECTOMY      HAND SURGERY      HYSTERECTOMY      TUBAL ABDOMINAL LIGATION       Family History   Problem Relation Age of Onset    Diabetes Father     Stroke Paternal Grandmother     Crohn's disease Maternal Grandmother     Breast cancer Paternal Aunt 45    Prostate cancer Neg Hx     Colon cancer Neg Hx     Ovarian cancer Neg Hx     Uterine cancer Neg Hx      Social History     Socioeconomic History    Marital status:    Tobacco Use    Smoking status: Never    Smokeless tobacco: Never   Vaping Use    Vaping status: Every Day    Substances: Nicotine, Flavoring    Devices: Disposable   Substance and Sexual Activity    Alcohol use: Yes    Drug use: Never    Sexual activity: Yes     Partners: Male     Birth control/protection: Hysterectomy     Allergies   Allergen Reactions    Codeine Nausea And Vomiting     Pt stated tylenol codeine       Current Outpatient Medications:     atorvastatin (LIPITOR) 40 MG tablet, Take 1 tablet by mouth Daily., Disp: 90 tablet, Rfl: 1    BD Pen Needle Nunu 2nd Gen 32G X 4 MM misc, See Admin Instructions., Disp: , Rfl:     BD Pen Needle Nunu 2nd Gen 32G X 4 MM misc, USE AS DIRECTED, Disp: 100 each, Rfl: 0    Blood Glucose Monitoring Suppl w/Device kit, Dispense 1 kit with monitor to check blood sugar once daily. Diagnosis: DM E11.65, Disp: 1 each, Rfl: 0    busPIRone (BUSPAR) 10 MG tablet,  Take 1 tablet by mouth 3 (Three) Times a Day As Needed (anxiety)., Disp: 180 tablet, Rfl: 1    cetirizine (zyrTEC) 10 MG tablet, Take 1 tablet by mouth Daily., Disp: 30 tablet, Rfl: 3    clotrimazole (LOTRIMIN) 1 % cream, Apply 1 Application topically to the appropriate area as directed 2 (Two) Times a Day., Disp: 45 g, Rfl: 0    clotrimazole-betamethasone (LOTRISONE) 1-0.05 % cream, Apply 1 Application topically to the appropriate area as directed 2 (Two) Times a Day. Apply to affected area twice daily, Disp: 45 g, Rfl: 2    cyclobenzaprine (FLEXERIL) 10 MG tablet, Take 0.5-1 tablets by mouth At Night As Needed for Muscle Spasms., Disp: 30 tablet, Rfl: 0    famotidine (Pepcid) 20 MG tablet, Take 1 tablet by mouth 2 (Two) Times a Day As Needed for Heartburn., Disp: 60 tablet, Rfl: 1    fluconazole (Diflucan) 150 MG tablet, Take 1 tablet (150mg) PO then repeat in 3 days, Disp: 2 tablet, Rfl: 0    glucose blood test strip, Check blood sugar once daily in the morning, Disp: 100 each, Rfl: 12    Insulin Glargine (LANTUS SOLOSTAR) 100 UNIT/ML injection pen, Inject 45 Units under the skin into the appropriate area as directed Every Night., Disp: 15 mL, Rfl: 2    Lancets (freestyle) lancets, Check blood sugar once daily in the morning, Disp: 100 each, Rfl: 12    Synjardy XR 12.5-1000 MG tablet sustained-release 24 hour, Take 2 tablets by mouth Daily., Disp: 60 tablet, Rfl: 5    Continuous Glucose Sensor (FreeStyle Desean 3 Sensor) misc, Use 1 each Every 14 (Fourteen) Days., Disp: 2 each, Rfl: 5    lisinopril (Zestril) 2.5 MG tablet, Take 1 tablet by mouth Daily. (Patient not taking: Reported on 1/9/2025), Disp: 90 tablet, Rfl: 1    norethindrone (Aygestin) 5 MG tablet, Take 1 tablet by mouth Daily. (Patient not taking: Reported on 1/9/2025), Disp: 30 tablet, Rfl: 6    Semaglutide,0.25 or 0.5MG/DOS, (Ozempic, 0.25 or 0.5 MG/DOSE,) 2 MG/3ML solution pen-injector, Inject 0.5 mg under the skin into the appropriate area as  "directed 1 (One) Time Per Week., Disp: 3 mL, Rfl: 1    Objective     Vitals:    01/09/25 1457   BP: 153/94   BP Location: Right arm   Patient Position: Sitting   Cuff Size: Large Adult   Pulse: 96   SpO2: 96%   Weight: 94.2 kg (207 lb 11.2 oz)   Height: 175.3 cm (69.02\")     Body mass index is 30.66 kg/m².    Physical Exam  Constitutional:       Appearance: Normal appearance. She is obese.      Comments: Obesity (BMI 30 - 39.9) Pt Current BMI = 30.66      HENT:      Head: Normocephalic and atraumatic.      Right Ear: External ear normal.      Left Ear: External ear normal.      Nose: Nose normal.   Eyes:      Extraocular Movements: Extraocular movements intact.      Conjunctiva/sclera: Conjunctivae normal.   Pulmonary:      Effort: Pulmonary effort is normal.   Musculoskeletal:         General: Normal range of motion.      Cervical back: Normal range of motion.   Skin:     General: Skin is warm and dry.   Neurological:      General: No focal deficit present.      Mental Status: She is alert and oriented to person, place, and time. Mental status is at baseline.   Psychiatric:         Mood and Affect: Mood normal.         Behavior: Behavior normal.         Thought Content: Thought content normal.         Judgment: Judgment normal.         Result Review :   The following data was reviewed by: JANE Gonsalves on 01/09/2025:    Most Recent A1C          8/28/2024    16:50   HGBA1C Most Recent   Hemoglobin A1C 9.40        A1C Last 3 Results          5/23/2024    15:31 8/28/2024    16:50   HGBA1C Last 3 Results   Hemoglobin A1C 12.30  9.40      A1c collected on 8/28/2024  is 9.4%, indicating Uncontrolled Type II diabetes.  This result is down from the prior result of 12.3% collected on 5/23/2024     Glucose   Date Value Ref Range Status   10/12/2023 355 (A) 70 - 130 mg/dL Final     Creatinine   Date Value Ref Range Status   08/28/2024 0.50 (L) 0.57 - 1.00 mg/dL Final   05/23/2024 0.60 0.57 - 1.00 mg/dL Final     eGFR "   Date Value Ref Range Status   08/28/2024 120.3 >60.0 mL/min/1.73 Final   05/23/2024 115.8 >60.0 mL/min/1.73 Final     Labs collected on 8/28/2024 show Normal values    Microalbumin, Urine   Date Value Ref Range Status   05/23/2024 30.8 mg/dL Final   03/14/2023 43.7 mg/dL Final     Total Cholesterol   Date Value Ref Range Status   08/28/2024 276 (H) 0 - 200 mg/dL Final   05/23/2024 288 (H) 0 - 200 mg/dL Final     Triglycerides   Date Value Ref Range Status   08/28/2024 359 (H) 0 - 150 mg/dL Final   05/23/2024 477 (H) 0 - 150 mg/dL Final     HDL Cholesterol   Date Value Ref Range Status   08/28/2024 43 40 - 60 mg/dL Final   05/23/2024 44 40 - 60 mg/dL Final     LDL Cholesterol    Date Value Ref Range Status   08/28/2024 164 (H) 0 - 100 mg/dL Final   05/23/2024 153 (H) 0 - 100 mg/dL Final     Lipid panel collected on 8/28/2024 shows Hyperlipidemia, Hypercholesterolemia, and Hypertriglyceridemia                Diagnoses and all orders for this visit:    1. Type 2 diabetes mellitus with hyperglycemia, with long-term current use of insulin (Primary)  -     Semaglutide,0.25 or 0.5MG/DOS, (Ozempic, 0.25 or 0.5 MG/DOSE,) 2 MG/3ML solution pen-injector; Inject 0.5 mg under the skin into the appropriate area as directed 1 (One) Time Per Week.  Dispense: 3 mL; Refill: 1  -     Continuous Glucose Sensor (FreeStyle Desean 3 Sensor) misc; Use 1 each Every 14 (Fourteen) Days.  Dispense: 2 each; Refill: 5    2. Noncompliance with medication regimen        Assessment & Plan  1. Diabetes Mellitus.  Her blood glucose levels have been significantly elevated, leading to recurrent yeast infections. The lowest recorded blood glucose level in the past 90 days was 166, with a range of 372 to 412 over the past 30 days, and an average of 392. The target average blood glucose level is between 70 and 180. Her A1c was 12.3 in May and 9.4 in August. She is advised to continue her daily insulin regimen. She is instructed to take Synjardy XR  12.5-1000 mg, one tablet in the morning and one in the evening, to potentially alleviate gastrointestinal discomfort. She is also advised to administer her insulin injections at least 2 inches away from her belly button. A prescription for Ozempic 0.25 mg once weekly for a minimum of 4 weeks will be provided. If tolerated, the dosage may be increased to 0.5 mg. A prescription for the FreeStyle Desean 3 continuous glucose monitor will also be issued.    2. Endometriosis.  She reports a history of hysterectomy with the removal of the uterus but retention of the ovaries. There is a plan to remove the ovaries due to regrowth.    3. Anxiety.  She reports taking buspirone for anxiety, which causes increased hunger.    Follow-up  The patient will follow up in 1 month.    PROCEDURE  The patient had a hysterectomy with uterus removal, but the ovaries were left intact. She also had an endometriosis tumor removed, which was not cancerous. Additionally, she underwent an appendectomy and cholecystectomy.      The patient has no prior history or family history of pancreatic cancer, medullary thyroid cancer or multiple endocrine neoplasia type IIa or IIb.  Therefore, this patient is an appropriate candidate for GLP-1 therapy.     The patient will monitor her blood glucose levels 2 times daily.  If she develops problematic hyperglycemia or hypoglycemia or adverse drug reactions, she will contact the office for further instructions.        Follow Up     Return in about 4 weeks (around 2/6/2025) for Medication Management, CGM Follow-Up.    Patient was given instructions and counseling regarding her condition or for health maintenance advice. Please see specific information pulled into the AVS if appropriate.     JANE Gonsalves  01/09/2025    Patient or patient representative verbalized consent for the use of Ambient Listening during the visit with  JANE Gonsalves for chart documentation. 1/9/2025  15:25 EST    Dictated  Utilizing Dragon Dictation.  Please note that portions of this note were completed with a voice recognition program.  Part of this note may be an electronic transcription/translation of spoken language to printed text using the Dragon Dictation System.

## 2025-01-13 ENCOUNTER — PRIOR AUTHORIZATION (OUTPATIENT)
Dept: DIABETES SERVICES | Facility: HOSPITAL | Age: 45
End: 2025-01-13
Payer: MEDICAID

## 2025-01-31 ENCOUNTER — PATIENT OUTREACH (OUTPATIENT)
Dept: CASE MANAGEMENT | Facility: OTHER | Age: 45
End: 2025-01-31
Payer: MEDICAID

## 2025-01-31 DIAGNOSIS — E11.65 TYPE 2 DIABETES MELLITUS WITH HYPERGLYCEMIA, WITHOUT LONG-TERM CURRENT USE OF INSULIN: Primary | ICD-10-CM

## 2025-01-31 DIAGNOSIS — F33.1 MAJOR DEPRESSIVE DISORDER, RECURRENT EPISODE, MODERATE DEGREE: ICD-10-CM

## 2025-01-31 DIAGNOSIS — E78.2 MIXED HYPERLIPIDEMIA: ICD-10-CM

## 2025-01-31 NOTE — OUTREACH NOTE
[facility] End of Month Documentation    This Chronic Medical Management Care Plan for Sharmaine Torres, 44 y.o. female, has been monitored and managed; reviewed; revised and a new plan of care implemented for the month of January.  A cumulative time of 56  minutes was spent on this patient record this month, including phone call with patient; electronic communication with primary care provider; phone call with other providers; chart review.    Regarding the patient's problems: has Hypothyroidism; Type 2 diabetes mellitus with complication; Mixed hyperlipidemia; Generalized anxiety disorder; Type 2 diabetes mellitus with hyperglycemia, with long-term current use of insulin; Generalized abdominal pain; Diarrhea; HGSIL on Pap smear of cervix; Rectocele; Endometriosis; Chronic pelvic pain in female; Endometrioma; Menopausal symptoms; Chest pain; Vaginal discharge; and Dysuria on their problem list., the following items were addressed: medical records; medications; referrals to community service providers, Appointments with Diabetic Clinic and Diabetic educator made and any changes can be found within the plan section of the note.  A detailed listing of time spent for chronic care management is tracked within each outreach encounter.  Current medications include:  has a current medication list which includes the following prescription(s): atorvastatin, bd pen needle feliberto 2nd gen, bd pen needle feliberto 2nd gen, blood glucose monitoring suppl, buspirone, cetirizine, clotrimazole, clotrimazole-betamethasone, freestyle jennifer 3 sensor, cyclobenzaprine, famotidine, fluconazole, glucose blood, insulin glargine, freestyle, lisinopril, norethindrone, ozempic (0.25 or 0.5 mg/dose), and synjardy xr. and the patient is reported to be patient is noncompliant with medication protocol,  Medications are reported to be non-effective in controlling symptoms and changes have been made to the medication protocol, BG is running 300-400 in the  mornings.  Regarding these diagnoses, referrals were made to the following provider(s):  Behavioral health.  All notes on chart for PCP to review.    The patient was monitored remotely for mood & behavior; pain; medications; blood glucose.    The patient's physical needs include:  help taking medications as prescribed; physical healthcare; physician referral; medication education, Appts made for Diabetes Clinic and Educator.     The patient's mental support needs include:  increased support, Placing a referral for Behavioral Health    The patient's cognitive support needs include:  medication; health care; increased support; emotional care; coordination of community providers, Patient has difficulty remember appointments and to take her medications    The patient's psychosocial support needs include:  medication management or adherence; need for increased support; coordination of community providers    The patient's functional needs include: medication education; physical healthcare, referral Behavioral Health--appts made to Diabetes Clinic and Educator    The patient's environmental needs include:  resources for disability needs    Care Plan overall comments:  Revised and Ongoing    Refer to previous outreach notes for more information on the areas listed above.    Monthly Billing Diagnoses  (E11.65) Type 2 diabetes mellitus with hyperglycemia, without long-term current use of insulin    (E78.2) Mixed hyperlipidemia    (F33.1) Major depressive disorder, recurrent episode, moderate degree    Medications   Medications have been reconciled    Care Plan progress this month:      Recently Modified Care Plans Updates made since 12/31/2024 12:00 AM      No recently modified care plans.            Instructions   Patient was provided an electronic copy of care plan  CCM services were explained and offered and patient has accepted these services.  Patient has given their written consent to receive CCM services and  understands that this includes the authorization of electronic communication of medical information with the other treating providers.  Patient understands that they may stop CCM services at any time and these changes will be effective at the end of the calendar month and will effectively revocate the agreement of CCM services.  Patient understands that only one practitioner can furnish and be paid for CCM services during one calendar month.  Patient also understands that there may be co-payment or deductible fees in association with CCM services.  Patient will continue with at least monthly follow-up calls with the Ambulatory .    Sadaf TINSLEY  Ambulatory Case Management    1/31/2025, 11:14 EST

## 2025-02-12 ENCOUNTER — TELEPHONE (OUTPATIENT)
Dept: CASE MANAGEMENT | Facility: OTHER | Age: 45
End: 2025-02-12
Payer: MEDICAID

## 2025-02-14 ENCOUNTER — PATIENT OUTREACH (OUTPATIENT)
Dept: CASE MANAGEMENT | Facility: OTHER | Age: 45
End: 2025-02-14
Payer: MEDICAID

## 2025-02-14 DIAGNOSIS — E11.65 TYPE 2 DIABETES MELLITUS WITH HYPERGLYCEMIA, WITHOUT LONG-TERM CURRENT USE OF INSULIN: Primary | ICD-10-CM

## 2025-02-14 NOTE — OUTREACH NOTE
AMBULATORY CASE MANAGEMENT NOTE    Names and Relationships of Patient/Support Persons: Contact: Brian Sharmaine GONCALVES; Relationship: Self -     I spoke with the patient on the phone regarding her diabetes management and medication adherence.    Patient stated she went to see JANE Seymour on 1/9. She was very pleased with the visit. He started her on Ozempic. He did not check an A1c at that visit. Her most recent A1c was in August and it was 9.4, which was down from the previous at 12.3. Patient was supposed to follow up with JANE Thomas yesterday but No Showed for that appointment. Patient also cancelled her appointment with the Diabetes Educator, Aydee Clement.    I have encouraged her to call the Diabetes Clinic today to reschedule those appointments.    Patient states that her fasting BG has been 120-130 when she has checked it. She has not checked it or taken her pill form medication in 3 days due to having diarrhea, according to patient. She stated she has taken all of her medication today and has resolved to continue this.    I will plan to follow up with patient after she follows up with SALLY Willard. Patient is agreeable to this.    Magdalena PETERS  Ambulatory Case Management    2/14/2025, 15:51 EST

## 2025-03-13 ENCOUNTER — TELEPHONE (OUTPATIENT)
Dept: CASE MANAGEMENT | Facility: OTHER | Age: 45
End: 2025-03-13
Payer: MEDICAID

## 2025-03-13 NOTE — TELEPHONE ENCOUNTER
Attempted to call patient. Patient stated she is on the way to take her son to the dentist so she is unable to talk. She states she will call me back tomorrow. UTR X 1

## 2025-03-21 ENCOUNTER — TELEPHONE (OUTPATIENT)
Dept: CASE MANAGEMENT | Facility: OTHER | Age: 45
End: 2025-03-21
Payer: MEDICAID

## 2025-03-24 ENCOUNTER — TELEPHONE (OUTPATIENT)
Dept: CASE MANAGEMENT | Facility: OTHER | Age: 45
End: 2025-03-24
Payer: MEDICAID

## 2025-03-24 NOTE — TELEPHONE ENCOUNTER
Attempted to call patient. Left voicemail. UTR X 3 At this time I am placing the patient in monitoring for loss of contact.

## 2025-04-29 DIAGNOSIS — Z79.4 TYPE 2 DIABETES MELLITUS WITH HYPERGLYCEMIA, WITH LONG-TERM CURRENT USE OF INSULIN: ICD-10-CM

## 2025-04-29 DIAGNOSIS — E11.65 TYPE 2 DIABETES MELLITUS WITH HYPERGLYCEMIA, WITH LONG-TERM CURRENT USE OF INSULIN: ICD-10-CM

## 2025-04-29 RX ORDER — ACYCLOVIR 800 MG/1
1 TABLET ORAL
Qty: 2 EACH | Refills: 5 | Status: SHIPPED | OUTPATIENT
Start: 2025-04-29

## 2025-04-29 RX ORDER — SEMAGLUTIDE 0.68 MG/ML
0.5 INJECTION, SOLUTION SUBCUTANEOUS WEEKLY
Qty: 3 ML | Refills: 1 | Status: SHIPPED | OUTPATIENT
Start: 2025-04-29

## 2025-04-29 NOTE — TELEPHONE ENCOUNTER
Caller: Sharmaine Torres    Relationship: Self    Best call back number: [phone]     Requested Prescriptions:   Requested Prescriptions     Pending Prescriptions Disp Refills    Continuous Glucose Sensor (FreeStyle Desean 3 Sensor) misc 2 each 5     Sig: Use 1 each Every 14 (Fourteen) Days.    Semaglutide,0.25 or 0.5MG/DOS, (Ozempic, 0.25 or 0.5 MG/DOSE,) 2 MG/3ML solution pen-injector 3 mL 1     Sig: Inject 0.5 mg under the skin into the appropriate area as directed 1 (One) Time Per Week.        Pharmacy where request should be sent:    [address], [place] - [address] [facility] - [phone] [facility] [phone]  [facility] [address]: [phone] Fax: [phone]: Not open 24 hours   Last office visit with prescribing clinician: 1/9/2025   Last telemedicine visit with prescribing clinician: Visit date not found   Next office visit with prescribing clinician: Visit date not found     Additional details provided by patient:     Does the patient have less than a 3 day supply:  [x] Yes  [] No    Would you like a call back once the refill request has been completed: [x] Yes [] No    If the office needs to give you a call back, can they leave a voicemail: [] Yes [] No    Anastasiia Stevenson Rep   04/29/25 13:14 EDT

## 2025-04-29 NOTE — TELEPHONE ENCOUNTER
Caller: Brian Sharmaine J    Relationship: Self    Best call back number: [phone]     Requested Prescriptions:   Requested Prescriptions     Pending Prescriptions Disp Refills    Insulin Pen Needle (BD Pen Needle Nunu 2nd Gen) 32G X 4 MM misc 100 each 0     Sig: See Admin Instructions.    cetirizine (zyrTEC) 10 MG tablet 30 tablet 3     Sig: Take 1 tablet by mouth Daily.    Insulin Glargine (LANTUS SOLOSTAR) 100 UNIT/ML injection pen 15 mL 2     Sig: Inject 45 Units under the skin into the appropriate area as directed Every Night.        Pharmacy where request should be sent: [address] - [phone]  - [phone] [facility]     Last office visit with prescribing clinician: 8/28/2024   Last telemedicine visit with prescribing clinician: Visit date not found   Next office visit with prescribing clinician: Visit date not found     Additional details provided by patient:   Does the patient have less than a 3 day supply:  [] Yes  [] No    Would you like a call back once the refill request has been completed: [] Yes [] No    If the office needs to give you a call back, can they leave a voicemail: [] Yes [] No    Anastasiia Brantley Rep   04/29/25 13:10 EDT

## 2025-04-30 ENCOUNTER — PRIOR AUTHORIZATION (OUTPATIENT)
Dept: DIABETES SERVICES | Facility: HOSPITAL | Age: 45
End: 2025-04-30
Payer: MEDICAID

## 2025-04-30 ENCOUNTER — TELEPHONE (OUTPATIENT)
Dept: CASE MANAGEMENT | Facility: OTHER | Age: 45
End: 2025-04-30
Payer: MEDICAID

## 2025-04-30 RX ORDER — PEN NEEDLE, DIABETIC 32GX 5/32"
NEEDLE, DISPOSABLE MISCELLANEOUS
Qty: 100 EACH | Refills: 0 | Status: SHIPPED | OUTPATIENT
Start: 2025-04-30

## 2025-04-30 RX ORDER — CETIRIZINE HYDROCHLORIDE 10 MG/1
10 TABLET ORAL DAILY
Qty: 30 TABLET | Refills: 3 | Status: SHIPPED | OUTPATIENT
Start: 2025-04-30

## 2025-04-30 NOTE — TELEPHONE ENCOUNTER
AYSE - PATIENT WAS SEEN ON 1/9/25 AS A NEW PATIENT AND HAS NOT KEPT HER LAST APPT ON 2/13/25. SHE IS NOT SCHEDULED AGAIN UNTIL 6/5/25. IS IT OK TO SUBMIT THE OZEMPIC PA OR DOES SHE NEED TO BE SEEN FIRST?

## 2025-04-30 NOTE — TELEPHONE ENCOUNTER
[facility] RE[facility]CEIV[facility] FOR THE FOLLOWING MEDICATION  Semaglutide,0.25 or 0.5MG/DOS, (Ozempic, 0.25 or 0.5 MG/DOSE,) 2 MG/3ML solution pen-injector (04/29/2025)   Key: [id number]

## 2025-05-09 ENCOUNTER — OFFICE VISIT (OUTPATIENT)
Dept: INTERNAL MEDICINE | Facility: CLINIC | Age: 45
End: 2025-05-09
Payer: MEDICAID

## 2025-05-09 VITALS
RESPIRATION RATE: 16 BRPM | TEMPERATURE: 98.1 F | OXYGEN SATURATION: 96 % | HEIGHT: 69 IN | DIASTOLIC BLOOD PRESSURE: 80 MMHG | WEIGHT: 200.4 LBS | SYSTOLIC BLOOD PRESSURE: 122 MMHG | HEART RATE: 87 BPM | BODY MASS INDEX: 29.68 KG/M2

## 2025-05-09 DIAGNOSIS — E03.9 HYPOTHYROIDISM, UNSPECIFIED TYPE: ICD-10-CM

## 2025-05-09 DIAGNOSIS — E78.2 MIXED HYPERLIPIDEMIA: ICD-10-CM

## 2025-05-09 DIAGNOSIS — E11.8 TYPE 2 DIABETES MELLITUS WITH COMPLICATION: Primary | ICD-10-CM

## 2025-05-09 DIAGNOSIS — K59.00 CONSTIPATION, UNSPECIFIED CONSTIPATION TYPE: ICD-10-CM

## 2025-05-09 DIAGNOSIS — Z12.31 SCREENING MAMMOGRAM FOR BREAST CANCER: ICD-10-CM

## 2025-05-09 DIAGNOSIS — E11.65 TYPE 2 DIABETES MELLITUS WITH HYPERGLYCEMIA, WITHOUT LONG-TERM CURRENT USE OF INSULIN: ICD-10-CM

## 2025-05-09 LAB
ALBUMIN SERPL-MCNC: 4.7 G/DL (ref 3.5–5.2)
ALBUMIN UR-MCNC: 4.5 MG/DL
ALBUMIN/GLOB SERPL: 1.7 G/DL
ALP SERPL-CCNC: 97 U/L (ref 39–117)
ALT SERPL W P-5'-P-CCNC: 12 U/L (ref 1–33)
ANION GAP SERPL CALCULATED.3IONS-SCNC: 12 MMOL/L (ref 5–15)
AST SERPL-CCNC: 14 U/L (ref 1–32)
BASOPHILS # BLD AUTO: 0.09 10*3/MM3 (ref 0–0.2)
BASOPHILS NFR BLD AUTO: 1.1 % (ref 0–1.5)
BILIRUB SERPL-MCNC: 0.2 MG/DL (ref 0–1.2)
BUN SERPL-MCNC: 14 MG/DL (ref 6–20)
BUN/CREAT SERPL: 23 (ref 7–25)
CALCIUM SPEC-SCNC: 9 MG/DL (ref 8.6–10.5)
CHLORIDE SERPL-SCNC: 101 MMOL/L (ref 98–107)
CHOLEST SERPL-MCNC: 251 MG/DL (ref 0–200)
CO2 SERPL-SCNC: 23 MMOL/L (ref 22–29)
CREAT SERPL-MCNC: 0.61 MG/DL (ref 0.57–1)
CREAT UR-MCNC: 109.2 MG/DL
DEPRECATED RDW RBC AUTO: 44.3 FL (ref 37–54)
EGFRCR SERPLBLD CKD-EPI 2021: 114.6 ML/MIN/1.73
EOSINOPHIL # BLD AUTO: 0.21 10*3/MM3 (ref 0–0.4)
EOSINOPHIL NFR BLD AUTO: 2.6 % (ref 0.3–6.2)
ERYTHROCYTE [DISTWIDTH] IN BLOOD BY AUTOMATED COUNT: 13 % (ref 12.3–15.4)
GLOBULIN UR ELPH-MCNC: 2.8 GM/DL
GLUCOSE SERPL-MCNC: 222 MG/DL (ref 65–99)
HBA1C MFR BLD: 7.1 % (ref 4.8–5.6)
HCT VFR BLD AUTO: 42.3 % (ref 34–46.6)
HDLC SERPL-MCNC: 39 MG/DL (ref 40–60)
HGB BLD-MCNC: 13.9 G/DL (ref 12–15.9)
IMM GRANULOCYTES # BLD AUTO: 0.02 10*3/MM3 (ref 0–0.05)
IMM GRANULOCYTES NFR BLD AUTO: 0.3 % (ref 0–0.5)
LDLC SERPL CALC-MCNC: 129 MG/DL (ref 0–100)
LDLC/HDLC SERPL: 3.07 {RATIO}
LYMPHOCYTES # BLD AUTO: 2.23 10*3/MM3 (ref 0.7–3.1)
LYMPHOCYTES NFR BLD AUTO: 28.1 % (ref 19.6–45.3)
MCH RBC QN AUTO: 30.7 PG (ref 26.6–33)
MCHC RBC AUTO-ENTMCNC: 32.9 G/DL (ref 31.5–35.7)
MCV RBC AUTO: 93.4 FL (ref 79–97)
MICROALBUMIN/CREAT UR: 41.2 MG/G (ref 0–29)
MONOCYTES # BLD AUTO: 0.59 10*3/MM3 (ref 0.1–0.9)
MONOCYTES NFR BLD AUTO: 7.4 % (ref 5–12)
NEUTROPHILS NFR BLD AUTO: 4.79 10*3/MM3 (ref 1.7–7)
NEUTROPHILS NFR BLD AUTO: 60.5 % (ref 42.7–76)
NRBC BLD AUTO-RTO: 0 /100 WBC (ref 0–0.2)
PLATELET # BLD AUTO: 257 10*3/MM3 (ref 140–450)
PMV BLD AUTO: 12.2 FL (ref 6–12)
POTASSIUM SERPL-SCNC: 4.1 MMOL/L (ref 3.5–5.2)
PROT SERPL-MCNC: 7.5 G/DL (ref 6–8.5)
RBC # BLD AUTO: 4.53 10*6/MM3 (ref 3.77–5.28)
SODIUM SERPL-SCNC: 136 MMOL/L (ref 136–145)
TRIGL SERPL-MCNC: 462 MG/DL (ref 0–150)
TSH SERPL DL<=0.05 MIU/L-ACNC: 1.64 UIU/ML (ref 0.27–4.2)
VLDLC SERPL-MCNC: 83 MG/DL (ref 5–40)
WBC NRBC COR # BLD AUTO: 7.93 10*3/MM3 (ref 3.4–10.8)

## 2025-05-09 PROCEDURE — 84443 ASSAY THYROID STIM HORMONE: CPT | Performed by: PHYSICIAN ASSISTANT

## 2025-05-09 PROCEDURE — 85025 COMPLETE CBC W/AUTO DIFF WBC: CPT | Performed by: PHYSICIAN ASSISTANT

## 2025-05-09 PROCEDURE — 82043 UR ALBUMIN QUANTITATIVE: CPT | Performed by: PHYSICIAN ASSISTANT

## 2025-05-09 PROCEDURE — 82570 ASSAY OF URINE CREATININE: CPT | Performed by: PHYSICIAN ASSISTANT

## 2025-05-09 PROCEDURE — 80061 LIPID PANEL: CPT | Performed by: PHYSICIAN ASSISTANT

## 2025-05-09 PROCEDURE — 83036 HEMOGLOBIN GLYCOSYLATED A1C: CPT | Performed by: PHYSICIAN ASSISTANT

## 2025-05-09 PROCEDURE — 80053 COMPREHEN METABOLIC PANEL: CPT | Performed by: PHYSICIAN ASSISTANT

## 2025-05-09 RX ORDER — EMPAGLIFLOZIN, METFORMIN HYDROCHLORIDE 12.5; 1 MG/1; MG/1
2 TABLET, EXTENDED RELEASE ORAL DAILY
Qty: 60 TABLET | Refills: 5 | Status: CANCELLED | OUTPATIENT
Start: 2025-05-09

## 2025-05-09 RX ORDER — EMPAGLIFLOZIN, METFORMIN HYDROCHLORIDE 12.5; 1 MG/1; MG/1
2 TABLET, EXTENDED RELEASE ORAL DAILY
Qty: 60 TABLET | Refills: 5 | Status: SHIPPED | OUTPATIENT
Start: 2025-05-09

## 2025-05-09 RX ORDER — LISINOPRIL 2.5 MG/1
2.5 TABLET ORAL DAILY
Qty: 90 TABLET | Refills: 1 | Status: SHIPPED | OUTPATIENT
Start: 2025-05-09

## 2025-05-09 RX ORDER — ATORVASTATIN CALCIUM 40 MG/1
40 TABLET, FILM COATED ORAL DAILY
Qty: 90 TABLET | Refills: 1 | Status: SHIPPED | OUTPATIENT
Start: 2025-05-09

## 2025-05-09 NOTE — PROGRESS NOTES
Chief Complaint  Diabetes and Constipation    Subjective          Sharmaine Torres presents to [facility]  History of Present Illness  Dm: states she ran out of synjardy 1 month ago so she has not been taking it  BG running 100-110  Denies chest pain, palpitations, ha, dizziness  She is using 30-45 units of Lantus at night based on sugar   She has had constipation since starting constipation  She has been taking vegetable supplement which helps    Past Medical History:   Diagnosis Date    Abnormal Pap smear of cervix     precancerous cells pt had hyst for this issue    Anxiety     Depression     Diabetes mellitus     Endometriosis     Foot ulcer     Hyperlipidemia     Hypothyroidism     Obesity         Past Surgical History:   Procedure Laterality Date    APPENDECTOMY      CERVICAL CONE BIOPSY      several according to pt     SECTION      CHOLECYSTECTOMY      HAND SURGERY      HYSTERECTOMY      TUBAL ABDOMINAL LIGATION          Current Outpatient Medications on File Prior to Visit   Medication Sig Dispense Refill    BD Pen Needle Nunu 2nd Gen 32G X 4 MM misc See Admin Instructions.      Blood Glucose Monitoring Suppl w/Device kit Dispense 1 kit with monitor to check blood sugar once daily. Diagnosis: DM E11.65 1 each 0    busPIRone (BUSPAR) 10 MG tablet Take 1 tablet by mouth 3 (Three) Times a Day As Needed (anxiety). 180 tablet 1    cetirizine (zyrTEC) 10 MG tablet Take 1 tablet by mouth Daily. 30 tablet 3    clotrimazole (LOTRIMIN) 1 % cream Apply 1 Application topically to the appropriate area as directed 2 (Two) Times a Day. 45 g 0    clotrimazole-betamethasone (LOTRISONE) 1-0.05 % cream Apply 1 Application topically to the appropriate area as directed 2 (Two) Times a Day. Apply to affected area twice daily 45 g 2    Continuous Glucose Sensor (FreeStyle Desean 3 Sensor) [facility] Use 1 each Every 14 (Fourteen) Days. 2 each 5     "cyclobenzaprine (FLEXERIL) 10 MG tablet Take 0.5-1 tablets by mouth At Night As Needed for Muscle Spasms. 30 tablet 0    famotidine (Pepcid) 20 MG tablet Take 1 tablet by mouth 2 (Two) Times a Day As Needed for Heartburn. 60 tablet 1    glucose blood test strip Check blood sugar once daily in the morning 100 each 12    Insulin Glargine (LANTUS SOLOSTAR) 100 UNIT/ML injection pen Inject 45 Units under the skin into the appropriate area as directed Every Night. 15 mL 2    Insulin Pen Needle (BD Pen Needle Nunu 2nd Gen) 32G X 4 MM misc Use with insulin daily 100 each 0    Lancets (freestyle) lancets Check blood sugar once daily in the morning 100 each 12    norethindrone (Aygestin) 5 MG tablet Take 1 tablet by mouth Daily. 30 tablet 6    Semaglutide,0.25 or 0.5MG/DOS, (Ozempic, 0.25 or 0.5 MG/DOSE,) 2 MG/3ML solution pen-injector Inject 0.5 mg under the skin into the appropriate area as directed 1 (One) Time Per Week. 3 mL 1    [DISCONTINUED] atorvastatin (LIPITOR) 40 MG tablet Take 1 tablet by mouth Daily. 90 tablet 1    [DISCONTINUED] lisinopril (Zestril) 2.5 MG tablet Take 1 tablet by mouth Daily. 90 tablet 1    [DISCONTINUED] Synjardy XR 12.5-1000 MG tablet sustained-release 24 hour Take 2 tablets by mouth Daily. 60 tablet 5    [DISCONTINUED] fluconazole (Diflucan) 150 MG tablet Take 1 tablet (150mg) PO then repeat in 3 days (Patient not taking: Reported on 5/9/2025) 2 tablet 0     No current facility-administered medications on file prior to visit.        Allergies   Allergen Reactions    Codeine Nausea And Vomiting     Pt stated tylenol codeine       Social History     Tobacco Use   Smoking Status Never   Smokeless Tobacco Never          Objective   Vital Signs:   /80 (BP Location: Left arm, Patient Position: Sitting, Cuff Size: Adult)   Pulse 87   Temp 98.1 °F (36.7 °C) (Temporal)   Resp 16   Ht 175.3 cm (69.02\")   Wt 90.9 kg (200 lb 6.4 oz)   SpO2 96%   BMI 29.58 kg/m²     Physical Exam  Vitals " reviewed.   Constitutional:       Appearance: Normal appearance.   HENT:      Head: Normocephalic and atraumatic.      Nose: Nose normal.      Mouth/Throat:      Mouth: Mucous membranes are moist.   Eyes:      Extraocular Movements: Extraocular movements intact.      Conjunctiva/sclera: Conjunctivae normal.      Pupils: Pupils are equal, round, and reactive to light.   Cardiovascular:      Rate and Rhythm: Normal rate and regular rhythm.   Pulmonary:      Effort: Pulmonary effort is normal.      Breath sounds: Normal breath sounds.   Abdominal:      General: Abdomen is flat. Bowel sounds are normal.      Palpations: Abdomen is soft.   Musculoskeletal:         General: Normal range of motion.      Right foot: No deformity.      Left foot: No deformity.   Feet:      Right foot:      Skin integrity: No ulcer, blister, skin breakdown, erythema, callus, dry skin or fissure.      Left foot:      Skin integrity: No ulcer, blister, skin breakdown, erythema, callus, dry skin or fissure.      Comments: Diabetic Foot Exam Performed      Neurological:      General: No focal deficit present.      Mental Status: She is alert and oriented to person, place, and time.   Psychiatric:         Mood and Affect: Mood normal.        Result Review :   The following data was reviewed by: Adri Reeder PA-C on 05/09/2025:  Common labs          5/23/2024    15:31 8/28/2024    16:50   Common Labs   Glucose 368  195    BUN 12  11    Creatinine 0.60  0.50    Sodium 137  137    Potassium 4.6  4.1    Chloride 96  102    Calcium 9.8  9.8    Albumin 5.0  4.7    Total Bilirubin 0.2  0.2    Alkaline Phosphatase 120  122    AST (SGOT) 34  21    ALT (SGPT) 40  22    WBC 6.44  5.78    Hemoglobin 15.3  14.5    Hematocrit 45.4  43.8    Platelets 262  249    Total Cholesterol 288  276    Triglycerides 477  359    HDL Cholesterol 44  43    LDL Cholesterol  153  164    Hemoglobin A1C 12.30  9.40    Microalbumin, Urine 30.8                            Assessment and Plan    Diagnoses and all orders for this visit:    1. Type 2 diabetes mellitus with complication (Primary)  Assessment & Plan:  Labs today, will adjust medication based on results.      Orders:  -     Comprehensive Metabolic Panel  -     CBC & Differential  -     Hemoglobin A1c  -     Microalbumin / Creatinine Urine Ratio - Urine, Random Void    2. Mixed hyperlipidemia  Assessment & Plan:  Labs today, will adjust medication based on results.       Orders:  -     Lipid Panel    3. Hypothyroidism, unspecified type  Assessment & Plan:  Labs today, will adjust medication based on results.      Orders:  -     TSH    4. Type 2 diabetes mellitus with hyperglycemia, without long-term current use of insulin  -     lisinopril (Zestril) 2.5 MG tablet; Take 1 tablet by mouth Daily.  Dispense: 90 tablet; Refill: 1  -     atorvastatin (LIPITOR) 40 MG tablet; Take 1 tablet by mouth Daily.  Dispense: 90 tablet; Refill: 1    5. Screening mammogram for breast cancer  -     Mammo Screening Digital Tomosynthesis Bilateral With CAD; Future    6. Constipation, unspecified constipation type  Comments:  Discussed possible side effect of GLP1, continue increase fiber intake in diet. Will monitor for improvement at f/u    Other orders  -     Synjardy XR 12.5-1000 MG tablet sustained-release 24 hour; Take 2 tablets by mouth Daily.  Dispense: 60 tablet; Refill: 5        Follow Up   Return in about 3 months (around 8/9/2025).  Patient was given instructions and counseling regarding her condition or for health maintenance advice. Please see specific information pulled into the AVS if appropriate.

## 2025-05-28 ENCOUNTER — TELEPHONE (OUTPATIENT)
Dept: CASE MANAGEMENT | Facility: OTHER | Age: 45
End: 2025-05-28
Payer: MEDICAID

## 2025-05-28 NOTE — TELEPHONE ENCOUNTER
60 Day Chart Review-Monitoring for LOC---no UC/ER visits. No hospitalizations. There has been communication with PCP. A1c is down to 7.2.

## 2025-06-11 ENCOUNTER — PATIENT OUTREACH (OUTPATIENT)
Dept: CASE MANAGEMENT | Facility: OTHER | Age: 45
End: 2025-06-11
Payer: MEDICAID

## 2025-06-11 DIAGNOSIS — E11.65 TYPE 2 DIABETES MELLITUS WITH HYPERGLYCEMIA, WITHOUT LONG-TERM CURRENT USE OF INSULIN: Primary | ICD-10-CM

## 2025-06-11 NOTE — OUTREACH NOTE
AMBULATORY CASE MANAGEMENT NOTE    Names and Relationships of Patient/Support Persons: Contact: Sharmaine Torres; Relationship: Self -     CCM Interim Update    I spoke with the patient on the phone today. Her most recent A1c on 5/9/2025 was 7.10. She is almost at goal. Patient does state that she has not been checking her FBG daily and she is not wearing her CGM currently but has the supplies to do so. She also stated that she lost 71 pounds but thinks she has gained a lot of it back. We discussed the need to wear CGM, track FBG, take medications as prescribed. She has three medications at the pharmacy that were sent on 5/9 that have not been picked up. She agrees to pick these up and restart them. Patient does not have a follow up appointment with PCP or Diabetes Clinic.    Care Coordination    I made patient an AWV with PCP for 8/18/2025.    I gave patient the number to contact support for MyChart.    I also gave her the information to call Diabetes Clinic and make an appointment      Education Documentation  Monitoring Carbohydrate Intake, taught by Magdalena Casiano, RN at 6/11/2025  3:38 PM.  Learner: Patient  Readiness: Acceptance  Method: Explanation  Response: Verbalizes Understanding, Needs Reinforcement    Healthy Food Choices, taught by Magdalena Casiano RN at 6/11/2025  3:38 PM.  Learner: Patient  Readiness: Acceptance  Method: Explanation  Response: Verbalizes Understanding, Needs Reinforcement    Carbohydrate-Containing Foods, taught by Magdalena Casiano, RN at 6/11/2025  3:38 PM.  Learner: Patient  Readiness: Acceptance  Method: Explanation  Response: Verbalizes Understanding, Needs Reinforcement    Oral Medication, taught by Magdalena Casiano, RN at 6/11/2025  3:38 PM.  Learner: Patient  Readiness: Acceptance  Method: Explanation  Response: Verbalizes Understanding, Needs Reinforcement    Insulin Therapy, taught by Magdalena Casiano RN at 6/11/2025  3:38 PM.  Learner: Patient  Readiness:  Acceptance  Method: Explanation  Response: Verbalizes Understanding, Needs Reinforcement    Continuous Glucose Monitoring (CGM), taught by Magdalena Casiano RN at 6/11/2025  3:38 PM.  Learner: Patient  Readiness: Acceptance  Method: Explanation  Response: Verbalizes Understanding, Needs Reinforcement    Importance of Glycemic Management, taught by Magdalena Casiano RN at 6/11/2025  3:38 PM.  Learner: Patient  Readiness: Acceptance  Method: Explanation  Response: Verbalizes Understanding, Needs Reinforcement    Blood Glucose Monitoring, taught by Magdalena Casiano RN at 6/11/2025  3:38 PM.  Learner: Patient  Readiness: Acceptance  Method: Explanation  Response: Verbalizes Understanding, Needs Reinforcement    Blood Glucose Goal, taught by Madgalena Casiano RN at 6/11/2025  3:38 PM.  Learner: Patient  Readiness: Acceptance  Method: Explanation  Response: Verbalizes Understanding, Needs Reinforcement    A1C Goal, taught by Magdalena Casiano RN at 6/11/2025  3:38 PM.  Learner: Patient  Readiness: Acceptance  Method: Explanation  Response: Verbalizes Understanding, Needs Reinforcement    Frequency of Testing, taught by Magdalena Casiano RN at 6/11/2025  3:38 PM.  Learner: Patient  Readiness: Acceptance  Method: Explanation  Response: Verbalizes Understanding, Needs Reinforcement    Definition, taught by Magdalena Casiano RN at 6/11/2025  3:38 PM.  Learner: Patient  Readiness: Acceptance  Method: Explanation  Response: Verbalizes Understanding, Needs Reinforcement    Diabetes, Type 2, taught by Magdalena Casiano RN at 6/11/2025  3:38 PM.  Learner: Patient  Readiness: Acceptance  Method: Explanation  Response: Verbalizes Understanding, Needs Reinforcement          Magdalena PETERS  Ambulatory Case Management    6/11/2025, 15:38 EDT

## 2025-06-30 ENCOUNTER — PATIENT OUTREACH (OUTPATIENT)
Dept: CASE MANAGEMENT | Facility: OTHER | Age: 45
End: 2025-06-30
Payer: MEDICAID

## 2025-06-30 DIAGNOSIS — E78.2 MIXED HYPERLIPIDEMIA: ICD-10-CM

## 2025-06-30 DIAGNOSIS — E11.65 TYPE 2 DIABETES MELLITUS WITH HYPERGLYCEMIA, WITHOUT LONG-TERM CURRENT USE OF INSULIN: Primary | ICD-10-CM

## 2025-06-30 NOTE — OUTREACH NOTE
[facility] End of Month Documentation    This Chronic Medical Management Care Plan for Sharmaine Torres, 44 y.o. female, has been reviewed; revised and a new plan of care implemented for the month of June.  A cumulative time of 24  minutes was spent on this patient record this month, including phone call with patient; electronic communication with primary care provider; chart review.    Regarding the patient's problems: has Hypothyroidism; Type 2 diabetes mellitus with complication; Mixed hyperlipidemia; Generalized anxiety disorder; Type 2 diabetes mellitus with hyperglycemia, with long-term current use of insulin; Generalized abdominal pain; Diarrhea; HGSIL on Pap smear of cervix; Rectocele; Endometriosis; Chronic pelvic pain in female; Endometrioma; Menopausal symptoms; Chest pain; Vaginal discharge; and Dysuria on their problem list., the following items were addressed: medical records; medications and any changes can be found within the plan section of the note.  A detailed listing of time spent for chronic care management is tracked within each outreach encounter.  Current medications include:  has a current medication list which includes the following prescription(s): atorvastatin, bd pen needle feliberto 2nd gen, blood glucose monitoring suppl, buspirone, cetirizine, clotrimazole, clotrimazole-betamethasone, freestyle jennifer 3 sensor, cyclobenzaprine, famotidine, glucose blood, insulin glargine, bd pen needle feliberto 2nd gen, freestyle, lisinopril, norethindrone, ozempic (0.25 or 0.5 mg/dose), and synjardy xr. and the patient is reported to be patient is compliant with medication protocol,  Medications are reported to be effective, A1c down to 7.10.  Regarding these diagnoses, referrals were made to the following provider(s):  N/A.  All notes on chart for PCP to review.    The patient was monitored remotely for mood & behavior; pain; medications; blood glucose.    The patient's physical needs include:  help taking  medications as prescribed; physical healthcare; physician referral; medication education.     The patient's mental support needs include:  continued support    The patient's cognitive support needs include:  medication; health care; coordination of community providers; continued support    The patient's psychosocial support needs include:  coordination of community providers; continued support    The patient's functional needs include: medication education; physical healthcare, Needs appt with [facility]    The patient's environmental needs include:  resources for disability needs    Care Plan overall comments:  Revised and Ongoing    Refer to previous outreach notes for more information on the areas listed above.    Monthly Billing Diagnoses  (E11.65) Type 2 diabetes mellitus with hyperglycemia, without long-term current use of insulin    (E78.2) Mixed hyperlipidemia    Medications   Medications have been reconciled    Care Plan progress this month:      Recently Modified Care Plans Updates made since 5/30/2025 12:00 AM      No recently modified care plans.             Diabetes Type 2       Protect Myself From Diabetes Complications       Start:  06/11/25    Expected End:  06/11/27         My Protect Myself From Diabetes Complications To Do List       Start:  06/11/25       Why is this important?Having diabetes puts you at risk for complications, such as kidney disease, heart disease, nerve damage and eye or dental problems.You can manage your risk by making healthy lifestyle choices and getting regular checkups.            Monitor My Blood Sugar       Start:  06/11/25    Expected End:  06/11/27         My Blood Sugar Management To Do List       Start:  06/11/25       Why is this important?Checking your blood sugar (glucose) at home helps to keep it from getting very high or very low.Writing the results in a diary or log, or using an thais, helps your diabetes care provider know how to care for you.Your blood  sugar (glucose) log should have the time, date and results.Also write down the amount of insulin or other medicine that you take.            Learn About Diabetes       Start:  06/11/25    Expected End:  06/11/27         My Learn About Diabetes To Do List       Start:  06/11/25       Why is this important?Learning about diabetes can help you to manage it.            Perform Foot Care       Start:  06/11/25    Expected End:  06/11/27         My Foot Care To Do List       Start:  06/11/25       Why is this important?Good foot care is very important when you have diabetes.There are many things you can do to keep your feet healthy and catch a problem early.            Manage My Stress       Start:  06/11/25    Expected End:  06/11/27         My Stress Management To Do List       Start:  06/11/25       Why is this important?When you are stressed down or upset your body reacts too.            Find Ways to Be Active       Start:  06/11/25    Expected End:  06/11/27         My Activity To Do List       Start:  06/11/25       Why is this important?Being more active can help you to manage your blood sugar (glucose) levels.Being active can also help to prevent diabetes complications.            Optimal Care Coordination of a Patient Experiencing Diabetes, Type 2       Start:  06/11/25    Expected End:  06/11/27         Alleviate Barriers to Glycemic Management       Start:  06/11/25            Monitor and Manage Follow-Up for Comorbidities       Start:  06/11/25            Optimize Functional Ability       Start:  06/11/25            Support Wellbeing and Self-Management Success       Start:  06/11/25                Current Specialty Plan of Care Status signed by both patient and provider    Instructions   Patient was provided an electronic copy of care plan  CCM services were explained and offered and patient has accepted these services.  Patient has given their written consent to receive CCM services and understands that this  includes the authorization of electronic communication of medical information with the other treating providers.  Patient understands that they may stop CCM services at any time and these changes will be effective at the end of the calendar month and will effectively revocate the agreement of CCM services.  Patient understands that only one practitioner can furnish and be paid for CCM services during one calendar month.  Patient also understands that there may be co-payment or deductible fees in association with CCM services.  Patient will continue with at least monthly follow-up calls with the Ambulatory .    Magdalena PETERS  Ambulatory Case Management    6/30/2025, 10:18 EDT

## 2025-07-24 ENCOUNTER — TELEPHONE (OUTPATIENT)
Dept: CASE MANAGEMENT | Facility: OTHER | Age: 45
End: 2025-07-24
Payer: MEDICAID

## 2025-07-25 ENCOUNTER — TELEPHONE (OUTPATIENT)
Dept: CASE MANAGEMENT | Facility: OTHER | Age: 45
End: 2025-07-25
Payer: MEDICAID

## 2025-08-04 RX ORDER — PEN NEEDLE, DIABETIC 32GX 5/32"
NEEDLE, DISPOSABLE MISCELLANEOUS
Qty: 100 EACH | Refills: 0 | Status: SHIPPED | OUTPATIENT
Start: 2025-08-04

## 2025-08-18 ENCOUNTER — HOSPITAL ENCOUNTER (OUTPATIENT)
Dept: ULTRASOUND IMAGING | Facility: HOSPITAL | Age: 45
Discharge: HOME | End: 2025-08-18
Admitting: PHYSICIAN ASSISTANT
Payer: MEDICAID

## 2025-08-18 ENCOUNTER — OFFICE VISIT (OUTPATIENT)
Dept: INTERNAL MEDICINE | Facility: CLINIC | Age: 45
End: 2025-08-18
Payer: MEDICAID

## 2025-08-18 VITALS
BODY MASS INDEX: 31.28 KG/M2 | DIASTOLIC BLOOD PRESSURE: 86 MMHG | TEMPERATURE: 97.1 F | RESPIRATION RATE: 18 BRPM | OXYGEN SATURATION: 96 % | SYSTOLIC BLOOD PRESSURE: 138 MMHG | HEART RATE: 90 BPM | WEIGHT: 211.2 LBS | HEIGHT: 69 IN

## 2025-08-18 DIAGNOSIS — N93.9 VAGINAL BLEEDING: ICD-10-CM

## 2025-08-18 DIAGNOSIS — G89.29 CHRONIC PELVIC PAIN IN FEMALE: ICD-10-CM

## 2025-08-18 DIAGNOSIS — E11.65 TYPE 2 DIABETES MELLITUS WITH HYPERGLYCEMIA, WITHOUT LONG-TERM CURRENT USE OF INSULIN: Primary | ICD-10-CM

## 2025-08-18 DIAGNOSIS — K62.5 BRIGHT RED BLOOD PER RECTUM: ICD-10-CM

## 2025-08-18 DIAGNOSIS — R10.2 CHRONIC PELVIC PAIN IN FEMALE: ICD-10-CM

## 2025-08-18 DIAGNOSIS — E78.2 MIXED HYPERLIPIDEMIA: ICD-10-CM

## 2025-08-18 DIAGNOSIS — E03.9 HYPOTHYROIDISM, UNSPECIFIED TYPE: ICD-10-CM

## 2025-08-18 DIAGNOSIS — Z90.710 HISTORY OF HYSTERECTOMY: ICD-10-CM

## 2025-08-18 LAB
ALBUMIN SERPL-MCNC: 4.5 G/DL (ref 3.5–5.2)
ALBUMIN/GLOB SERPL: 1.6 G/DL
ALP SERPL-CCNC: 87 U/L (ref 39–117)
ALT SERPL W P-5'-P-CCNC: 17 U/L (ref 1–33)
ANION GAP SERPL CALCULATED.3IONS-SCNC: 12 MMOL/L (ref 5–15)
AST SERPL-CCNC: 35 U/L (ref 1–32)
BASOPHILS # BLD AUTO: 0.05 10*3/MM3 (ref 0–0.2)
BASOPHILS NFR BLD AUTO: 0.9 % (ref 0–1.5)
BILIRUB SERPL-MCNC: 0.3 MG/DL (ref 0–1.2)
BUN SERPL-MCNC: 11 MG/DL (ref 6–20)
BUN/CREAT SERPL: 25.6 (ref 7–25)
CALCIUM SPEC-SCNC: 9.3 MG/DL (ref 8.6–10.5)
CHLORIDE SERPL-SCNC: 100 MMOL/L (ref 98–107)
CHOLEST SERPL-MCNC: 225 MG/DL (ref 0–200)
CO2 SERPL-SCNC: 24 MMOL/L (ref 22–29)
CREAT SERPL-MCNC: 0.43 MG/DL (ref 0.57–1)
DEPRECATED RDW RBC AUTO: 42.5 FL (ref 37–54)
EGFRCR SERPLBLD CKD-EPI 2021: 123.9 ML/MIN/1.73
EOSINOPHIL # BLD AUTO: 0.19 10*3/MM3 (ref 0–0.4)
EOSINOPHIL NFR BLD AUTO: 3.3 % (ref 0.3–6.2)
ERYTHROCYTE [DISTWIDTH] IN BLOOD BY AUTOMATED COUNT: 12.7 % (ref 12.3–15.4)
GLOBULIN UR ELPH-MCNC: 2.9 GM/DL
GLUCOSE SERPL-MCNC: 204 MG/DL (ref 65–99)
HBA1C MFR BLD: 9.1 % (ref 4.8–5.6)
HCT VFR BLD AUTO: 41.1 % (ref 34–46.6)
HDLC SERPL-MCNC: 44 MG/DL (ref 40–60)
HGB BLD-MCNC: 13.9 G/DL (ref 12–15.9)
IMM GRANULOCYTES # BLD AUTO: 0.01 10*3/MM3 (ref 0–0.05)
IMM GRANULOCYTES NFR BLD AUTO: 0.2 % (ref 0–0.5)
LDLC SERPL CALC-MCNC: 136 MG/DL (ref 0–100)
LDLC/HDLC SERPL: 2.99 {RATIO}
LYMPHOCYTES # BLD AUTO: 1.67 10*3/MM3 (ref 0.7–3.1)
LYMPHOCYTES NFR BLD AUTO: 28.7 % (ref 19.6–45.3)
MCH RBC QN AUTO: 31.3 PG (ref 26.6–33)
MCHC RBC AUTO-ENTMCNC: 33.8 G/DL (ref 31.5–35.7)
MCV RBC AUTO: 92.6 FL (ref 79–97)
MONOCYTES # BLD AUTO: 0.4 10*3/MM3 (ref 0.1–0.9)
MONOCYTES NFR BLD AUTO: 6.9 % (ref 5–12)
NEUTROPHILS NFR BLD AUTO: 3.5 10*3/MM3 (ref 1.7–7)
NEUTROPHILS NFR BLD AUTO: 60 % (ref 42.7–76)
NRBC BLD AUTO-RTO: 0 /100 WBC (ref 0–0.2)
PLATELET # BLD AUTO: 276 10*3/MM3 (ref 140–450)
PMV BLD AUTO: 11 FL (ref 6–12)
POTASSIUM SERPL-SCNC: 4.3 MMOL/L (ref 3.5–5.2)
PROT SERPL-MCNC: 7.4 G/DL (ref 6–8.5)
RBC # BLD AUTO: 4.44 10*6/MM3 (ref 3.77–5.28)
SODIUM SERPL-SCNC: 136 MMOL/L (ref 136–145)
TRIGL SERPL-MCNC: 248 MG/DL (ref 0–150)
TSH SERPL DL<=0.05 MIU/L-ACNC: 1.96 UIU/ML (ref 0.27–4.2)
VLDLC SERPL-MCNC: 45 MG/DL (ref 5–40)
WBC NRBC COR # BLD AUTO: 5.82 10*3/MM3 (ref 3.4–10.8)

## 2025-08-18 PROCEDURE — 84443 ASSAY THYROID STIM HORMONE: CPT | Performed by: PHYSICIAN ASSISTANT

## 2025-08-18 PROCEDURE — 99214 OFFICE O/P EST MOD 30 MIN: CPT | Performed by: PHYSICIAN ASSISTANT

## 2025-08-18 PROCEDURE — 1125F AMNT PAIN NOTED PAIN PRSNT: CPT | Performed by: PHYSICIAN ASSISTANT

## 2025-08-18 PROCEDURE — 76830 TRANSVAGINAL US NON-OB: CPT

## 2025-08-18 PROCEDURE — 1160F RVW MEDS BY RX/DR IN RCRD: CPT | Performed by: PHYSICIAN ASSISTANT

## 2025-08-18 PROCEDURE — 80053 COMPREHEN METABOLIC PANEL: CPT | Performed by: PHYSICIAN ASSISTANT

## 2025-08-18 PROCEDURE — 80061 LIPID PANEL: CPT | Performed by: PHYSICIAN ASSISTANT

## 2025-08-18 PROCEDURE — 1159F MED LIST DOCD IN RCRD: CPT | Performed by: PHYSICIAN ASSISTANT

## 2025-08-18 PROCEDURE — 85025 COMPLETE CBC W/AUTO DIFF WBC: CPT | Performed by: PHYSICIAN ASSISTANT

## 2025-08-18 PROCEDURE — 83036 HEMOGLOBIN GLYCOSYLATED A1C: CPT | Performed by: PHYSICIAN ASSISTANT

## 2025-08-18 PROCEDURE — 3051F HG A1C>EQUAL 7.0%<8.0%: CPT | Performed by: PHYSICIAN ASSISTANT

## 2025-08-18 PROCEDURE — 1170F FXNL STATUS ASSESSED: CPT | Performed by: PHYSICIAN ASSISTANT

## 2025-08-18 RX ORDER — ATORVASTATIN CALCIUM 40 MG/1
40 TABLET, FILM COATED ORAL DAILY
Qty: 90 TABLET | Refills: 1 | Status: SHIPPED | OUTPATIENT
Start: 2025-08-18 | End: 2025-08-20 | Stop reason: SDUPTHER

## 2025-08-18 RX ORDER — LISINOPRIL 2.5 MG/1
2.5 TABLET ORAL DAILY
Qty: 90 TABLET | Refills: 1 | Status: SHIPPED | OUTPATIENT
Start: 2025-08-18

## 2025-08-18 RX ORDER — BUSPIRONE HYDROCHLORIDE 10 MG/1
10 TABLET ORAL 3 TIMES DAILY PRN
Qty: 180 TABLET | Refills: 1 | Status: SHIPPED | OUTPATIENT
Start: 2025-08-18

## 2025-08-19 ENCOUNTER — TELEPHONE (OUTPATIENT)
Dept: INTERNAL MEDICINE | Facility: CLINIC | Age: 45
End: 2025-08-19
Payer: MEDICAID

## 2025-08-20 ENCOUNTER — TELEPHONE (OUTPATIENT)
Dept: INTERNAL MEDICINE | Facility: CLINIC | Age: 45
End: 2025-08-20
Payer: MEDICAID

## 2025-08-20 RX ORDER — ROSUVASTATIN CALCIUM 10 MG/1
10 TABLET, COATED ORAL DAILY
Qty: 90 TABLET | Refills: 1 | Status: SHIPPED | OUTPATIENT
Start: 2025-08-20